# Patient Record
Sex: MALE | Race: WHITE | NOT HISPANIC OR LATINO | Employment: OTHER | RURAL
[De-identification: names, ages, dates, MRNs, and addresses within clinical notes are randomized per-mention and may not be internally consistent; named-entity substitution may affect disease eponyms.]

---

## 2017-04-07 ENCOUNTER — CONVERSION ENCOUNTER (OUTPATIENT)
Dept: FAMILY MEDICINE CLINIC | Facility: CLINIC | Age: 32
End: 2017-04-07

## 2017-04-08 LAB
ALBUMIN SERPL-MCNC: 4.2 G/DL (ref 3.6–5.1)
ALP SERPL-CCNC: 61 U/L (ref 40–115)
AST SERPL-CCNC: 33 U/L (ref 10–40)
BILIRUB SERPL-MCNC: 0.5 MG/DL (ref 0.2–1.2)
BUN SERPL-MCNC: 8 MG/DL (ref 7–25)
BUN/CREAT SERPL: 8.9 (CALC) (ref 6–22)
CALCIUM SERPL-MCNC: 9.5 MG/DL (ref 8.6–10.2)
CHLORIDE SERPL-SCNC: 105 MMOL/L (ref 98–110)
CHOLEST SERPL-MCNC: 209 MG/DL (ref 125–200)
CONV CO2: 27 MMOL/L (ref 21–33)
CONV TOTAL PROTEIN: 6.3 G/DL (ref 6.2–8.3)
CREAT UR-MCNC: 0.9 MG/DL (ref 0.79–1.33)
GLOBULIN UR ELPH-MCNC: 2.1 MG/DL (ref 2.1–3.7)
GLUCOSE UR QL: 89 MG/DL (ref 65–99)
HDLC SERPL-MCNC: 43 MG/DL
INSULIN SERPL-ACNC: 2 (CALC) (ref 1–2.1)
LDLC SERPL CALC-MCNC: 138 MG/DL
POTASSIUM SERPL-SCNC: 4 MMOL/L (ref 3.5–5.3)
SODIUM SERPL-SCNC: 143 MMOL/L (ref 135–146)
TRIGL SERPL-MCNC: 141 MG/DL
TSH SERPL-ACNC: 4.92 MIU/L (ref 0.4–4.5)

## 2017-12-21 ENCOUNTER — OFFICE (OUTPATIENT)
Dept: URBAN - METROPOLITAN AREA CLINIC 64 | Facility: CLINIC | Age: 32
End: 2017-12-21

## 2017-12-21 VITALS
HEART RATE: 87 BPM | HEIGHT: 67 IN | WEIGHT: 193 LBS | DIASTOLIC BLOOD PRESSURE: 90 MMHG | SYSTOLIC BLOOD PRESSURE: 133 MMHG

## 2017-12-21 DIAGNOSIS — K59.00 CONSTIPATION, UNSPECIFIED: ICD-10-CM

## 2017-12-21 DIAGNOSIS — K21.9 GASTRO-ESOPHAGEAL REFLUX DISEASE WITHOUT ESOPHAGITIS: ICD-10-CM

## 2017-12-21 PROCEDURE — 99214 OFFICE O/P EST MOD 30 MIN: CPT | Performed by: NURSE PRACTITIONER

## 2017-12-21 RX ORDER — OMEPRAZOLE 40 MG/1
CAPSULE, DELAYED RELEASE ORAL
Qty: 270 | Refills: 2 | Status: ACTIVE

## 2017-12-21 RX ORDER — DOCUSATE SODIUM 100 MG/1
100 CAPSULE ORAL
Qty: 90 | Refills: 3 | Status: ACTIVE
Start: 2017-12-21

## 2018-03-23 ENCOUNTER — HOSPITAL ENCOUNTER (OUTPATIENT)
Dept: GENERAL RADIOLOGY | Facility: HOSPITAL | Age: 33
Discharge: HOME | End: 2018-03-23
Attending: FAMILY MEDICINE | Admitting: FAMILY MEDICINE

## 2018-05-31 ENCOUNTER — HOSPITAL ENCOUNTER (OUTPATIENT)
Dept: GENERAL RADIOLOGY | Facility: HOSPITAL | Age: 33
Discharge: HOME | End: 2018-05-31
Attending: INTERNAL MEDICINE | Admitting: INTERNAL MEDICINE

## 2019-05-16 ENCOUNTER — HOSPITAL ENCOUNTER (OUTPATIENT)
Dept: OTHER | Facility: HOSPITAL | Age: 34
Discharge: HOME | End: 2019-05-16
Attending: FAMILY MEDICINE | Admitting: FAMILY MEDICINE

## 2019-06-17 ENCOUNTER — TELEPHONE (OUTPATIENT)
Dept: FAMILY MEDICINE CLINIC | Facility: CLINIC | Age: 34
End: 2019-06-17

## 2019-06-17 NOTE — TELEPHONE ENCOUNTER
Patient stated that he is experiencing coughing fits. He wasn't sure on if its allergy or asthma related. He is scheduled to see Dr Callaway on Wednesday , just wanted to let you know since Dr Bassett was full .

## 2019-06-18 RX ORDER — CLOTRIMAZOLE 1 %
CREAM (GRAM) TOPICAL
Refills: 1 | COMMUNITY
Start: 2019-04-20 | End: 2020-07-13

## 2019-06-18 RX ORDER — BUSPIRONE HYDROCHLORIDE 7.5 MG/1
1 TABLET ORAL EVERY 12 HOURS
COMMUNITY
Start: 2018-10-26 | End: 2020-07-06

## 2019-06-18 RX ORDER — OMEPRAZOLE 40 MG/1
1 CAPSULE, DELAYED RELEASE ORAL DAILY
Refills: 1 | COMMUNITY
Start: 2019-03-13 | End: 2019-06-25

## 2019-06-18 RX ORDER — KETOCONAZOLE 20 MG/G
CREAM TOPICAL DAILY
COMMUNITY
End: 2019-08-01

## 2019-06-18 RX ORDER — METOPROLOL SUCCINATE 50 MG/1
50 TABLET, EXTENDED RELEASE ORAL DAILY
COMMUNITY
End: 2019-10-25

## 2019-06-18 RX ORDER — ALBUTEROL SULFATE 90 UG/1
2 AEROSOL, METERED RESPIRATORY (INHALATION) EVERY 4 HOURS PRN
COMMUNITY
Start: 2018-05-25 | End: 2020-07-06

## 2019-06-20 ENCOUNTER — OFFICE VISIT (OUTPATIENT)
Dept: FAMILY MEDICINE CLINIC | Facility: CLINIC | Age: 34
End: 2019-06-20

## 2019-06-20 VITALS
HEIGHT: 67 IN | DIASTOLIC BLOOD PRESSURE: 64 MMHG | TEMPERATURE: 98.2 F | BODY MASS INDEX: 28.25 KG/M2 | WEIGHT: 180 LBS | HEART RATE: 84 BPM | SYSTOLIC BLOOD PRESSURE: 108 MMHG | OXYGEN SATURATION: 99 % | RESPIRATION RATE: 20 BRPM

## 2019-06-20 DIAGNOSIS — J30.89 PERENNIAL ALLERGIC RHINITIS: ICD-10-CM

## 2019-06-20 DIAGNOSIS — R05.9 COUGH: Primary | ICD-10-CM

## 2019-06-20 PROBLEM — E78.00 HIGH CHOLESTEROL: Status: ACTIVE | Noted: 2019-06-20

## 2019-06-20 PROBLEM — F84.5 ASPERGER'S SYNDROME: Status: ACTIVE | Noted: 2019-06-20

## 2019-06-20 PROBLEM — F98.8 ADD (ATTENTION DEFICIT DISORDER): Status: ACTIVE | Noted: 2019-06-20

## 2019-06-20 PROBLEM — K44.9 SLIDING HIATAL HERNIA: Status: ACTIVE | Noted: 2019-06-20

## 2019-06-20 PROCEDURE — 99213 OFFICE O/P EST LOW 20 MIN: CPT | Performed by: FAMILY MEDICINE

## 2019-06-20 RX ORDER — LAMOTRIGINE 100 MG/1
150 TABLET ORAL DAILY
Qty: 30 TABLET | Refills: 0 | Status: CANCELLED | OUTPATIENT
Start: 2019-06-20

## 2019-06-20 RX ORDER — BENZONATATE 100 MG/1
100 CAPSULE ORAL 3 TIMES DAILY PRN
Qty: 30 CAPSULE | Refills: 0 | Status: SHIPPED | OUTPATIENT
Start: 2019-06-20 | End: 2019-06-25

## 2019-06-20 RX ORDER — NAPROXEN 500 MG/1
500 TABLET ORAL 2 TIMES DAILY PRN
Qty: 60 TABLET | Refills: 12 | Status: CANCELLED
Start: 2019-06-20 | End: 2019-07-20

## 2019-06-20 NOTE — PROGRESS NOTES
Subjective   Sourav Tamez is a 34 y.o. male.     Cough   This is a new problem. The current episode started 1 to 4 weeks ago. The problem has been unchanged. The problem occurs constantly. The cough is non-productive. Associated symptoms include postnasal drip and rhinorrhea. Pertinent negatives include no chest pain, ear pain, fever, headaches, heartburn, hemoptysis, rash, shortness of breath or wheezing. The symptoms are aggravated by cold air. He has tried steroid inhaler for the symptoms. The treatment provided no relief. His past medical history is significant for asthma.    Pt states he possibly had reaction to steroids but is unsure    The following portions of the patient's history were reviewed and updated as appropriate: allergies, current medications, past family history, past medical history, past social history, past surgical history and problem list.    Review of Systems   Constitutional: Negative for activity change, appetite change, fatigue and fever.   HENT: Positive for postnasal drip and rhinorrhea. Negative for ear pain and voice change.    Eyes: Negative for visual disturbance.   Respiratory: Positive for cough. Negative for hemoptysis, shortness of breath and wheezing.    Cardiovascular: Negative for chest pain and leg swelling.   Gastrointestinal: Negative for abdominal pain, blood in stool, constipation, diarrhea, nausea and vomiting.   Endocrine: Negative for polydipsia and polyuria.   Genitourinary: Negative for dysuria, frequency and hematuria.   Musculoskeletal: Negative for joint swelling, neck pain and neck stiffness.   Skin: Negative for rash and bruise.   Neurological: Negative for weakness, numbness and headache.   Psychiatric/Behavioral: Negative for suicidal ideas and depressed mood.       Objective   Physical Exam   Constitutional: He is oriented to person, place, and time. He appears well-developed and well-nourished.   Eyes: Conjunctivae and EOM are normal. Pupils are equal,  round, and reactive to light.   Neck: Normal range of motion. Neck supple.   Cardiovascular: Normal rate, regular rhythm and normal heart sounds.   Pulmonary/Chest: Effort normal and breath sounds normal.   Abdominal: Soft. Bowel sounds are normal.   Musculoskeletal: Normal range of motion.   Neurological: He is alert and oriented to person, place, and time.   Skin: Skin is warm and dry.   Psychiatric: He has a normal mood and affect. His behavior is normal. Judgment and thought content normal.         Assessment/Plan   Problems Addressed this Visit        Respiratory    Perennial allergic rhinitis    Cough - Primary

## 2019-06-20 NOTE — ASSESSMENT & PLAN NOTE
Add tessalon. Follow-up in approximately 3 days for reevaluation if not improved.  Follow-up sooner for worsening symptoms or any concerns.

## 2019-06-21 DIAGNOSIS — L30.9 ECZEMA, UNSPECIFIED TYPE: Primary | ICD-10-CM

## 2019-06-21 RX ORDER — CLOTRIMAZOLE 1 %
CREAM (GRAM) TOPICAL
Qty: 60 G | Refills: 0 | OUTPATIENT
Start: 2019-06-21

## 2019-06-21 RX ORDER — METHYLPREDNISOLONE 4 MG/1
TABLET ORAL
Refills: 0 | COMMUNITY
Start: 2019-05-20 | End: 2019-06-25

## 2019-06-21 RX ORDER — FLUTICASONE PROPIONATE 50 MCG
50 SPRAY, SUSPENSION (ML) NASAL
Refills: 11 | COMMUNITY
Start: 2019-06-17 | End: 2019-10-25

## 2019-06-21 RX ORDER — DOXYCYCLINE HYCLATE 100 MG/1
100 CAPSULE ORAL
COMMUNITY
Start: 2017-11-07 | End: 2019-06-25

## 2019-06-21 RX ORDER — LAMOTRIGINE 150 MG/1
150 TABLET ORAL DAILY
Refills: 5 | COMMUNITY
Start: 2019-05-16 | End: 2021-03-16 | Stop reason: DRUGHIGH

## 2019-06-25 ENCOUNTER — OFFICE VISIT (OUTPATIENT)
Dept: FAMILY MEDICINE CLINIC | Facility: CLINIC | Age: 34
End: 2019-06-25

## 2019-06-25 VITALS
HEART RATE: 103 BPM | SYSTOLIC BLOOD PRESSURE: 112 MMHG | BODY MASS INDEX: 28.03 KG/M2 | DIASTOLIC BLOOD PRESSURE: 78 MMHG | TEMPERATURE: 98.4 F | HEIGHT: 67 IN | OXYGEN SATURATION: 98 % | WEIGHT: 178.6 LBS | RESPIRATION RATE: 16 BRPM

## 2019-06-25 DIAGNOSIS — H61.23 BILATERAL HEARING LOSS DUE TO CERUMEN IMPACTION: ICD-10-CM

## 2019-06-25 DIAGNOSIS — J30.1 SEASONAL ALLERGIC RHINITIS DUE TO POLLEN: ICD-10-CM

## 2019-06-25 DIAGNOSIS — J45.41 MODERATE PERSISTENT ASTHMA WITH ACUTE EXACERBATION: Primary | ICD-10-CM

## 2019-06-25 DIAGNOSIS — H92.03 EAR PAIN, BILATERAL: ICD-10-CM

## 2019-06-25 DIAGNOSIS — J45.41 MODERATE PERSISTENT ASTHMA WITH ACUTE EXACERBATION: ICD-10-CM

## 2019-06-25 PROCEDURE — 69209 REMOVE IMPACTED EAR WAX UNI: CPT | Performed by: FAMILY MEDICINE

## 2019-06-25 PROCEDURE — 99214 OFFICE O/P EST MOD 30 MIN: CPT | Performed by: FAMILY MEDICINE

## 2019-06-25 RX ORDER — MONTELUKAST SODIUM 10 MG/1
10 TABLET ORAL NIGHTLY
Qty: 30 TABLET | Refills: 12 | Status: SHIPPED | OUTPATIENT
Start: 2019-06-25 | End: 2019-10-25

## 2019-06-25 NOTE — PROGRESS NOTES
"Don Tamez is a 34 y.o. male.     Cough   This is a recurrent problem. The current episode started more than 1 month ago. The problem has been unchanged. The cough is non-productive. Pertinent negatives include no rash. The symptoms are aggravated by pollens. He has tried ipratropium inhaler and steroid inhaler for the symptoms. The treatment provided mild relief. His past medical history is significant for environmental allergies.   Hearing Problem   This is a new problem. The current episode started in the past 7 days. The problem occurs constantly. The problem has been unchanged. Pertinent negatives include no abdominal pain, arthralgias, nausea, rash, swollen glands or vomiting.        The following portions of the patient's history were reviewed and updated as appropriate: allergies, current medications, past family history, past medical history, past social history, past surgical history and problem list.    Review of Systems   Constitutional: Negative for activity change.   HENT: Negative for sinus pressure and voice change.    Eyes: Negative for visual disturbance.   Gastrointestinal: Negative for abdominal pain, diarrhea, nausea and vomiting.   Endocrine: Negative for cold intolerance and heat intolerance.   Genitourinary: Negative for frequency and urgency.   Musculoskeletal: Negative for arthralgias.   Skin: Negative for rash.   Allergic/Immunologic: Positive for environmental allergies.   Neurological: Negative for syncope.   Hematological: Does not bruise/bleed easily.   Psychiatric/Behavioral: Negative for depressed mood. The patient is not nervous/anxious.        Objective   Visit Vitals  /78   Pulse 103   Temp 98.4 °F (36.9 °C) (Oral)   Resp 16   Ht 170.2 cm (67\")   Wt 81 kg (178 lb 9.6 oz)   SpO2 98%   BMI 27.97 kg/m²     Physical Exam   Constitutional: He appears well-developed and well-nourished.   HENT:   Right Ear: External ear normal. cerumen impaction is present.  Left " Ear: External ear normal. An impacted cerumen is present.  Nose: Nose normal.   Mouth/Throat: Oropharynx is clear and moist. No oropharyngeal exudate.   Only right irrigated. Stopped alf bc of pain   Cardiovascular: Regular rhythm and normal heart sounds. Exam reveals no gallop and no friction rub.   No murmur heard.  Pulmonary/Chest: Effort normal and breath sounds normal. No respiratory distress. He has no wheezes. He has no rales.   Lymphadenopathy:     He has no cervical adenopathy.   Neurological: He is alert.   Skin: Skin is warm and dry.   Vitals reviewed.        Assessment/Plan   Problem List Items Addressed This Visit     None      Visit Diagnoses     Moderate persistent asthma with acute exacerbation    -  Primary    mild exacerbation, no wheezing. Continue with current meds but add singulair, increase advair    Relevant Medications    montelukast (SINGULAIR) 10 MG tablet    fluticasone-salmeterol (ADVAIR DISKUS) 250-50 MCG/DOSE DISKUS    Seasonal allergic rhinitis due to pollen        Contributing to his cough. He sees an allergist and gets injections. Added singulair.     Ear pain, bilateral        Bilateral hearing loss due to cerumen impaction        only right irrigated. Had to stop bc of pain.         Return if needed. Advised he needs to talk to his allergist also about his injections and cough as well.

## 2019-06-26 ENCOUNTER — TELEPHONE (OUTPATIENT)
Dept: FAMILY MEDICINE CLINIC | Facility: CLINIC | Age: 34
End: 2019-06-26

## 2019-06-26 NOTE — TELEPHONE ENCOUNTER
[employer] not covered req. A change to Symbicort, Advair disk, Breo Ellipta, and or Advair HFA ER is this ok to change ?

## 2019-06-27 ENCOUNTER — PATIENT MESSAGE (OUTPATIENT)
Dept: FAMILY MEDICINE CLINIC | Facility: CLINIC | Age: 34
End: 2019-06-27

## 2019-06-27 ENCOUNTER — TELEPHONE (OUTPATIENT)
Dept: FAMILY MEDICINE CLINIC | Facility: CLINIC | Age: 34
End: 2019-06-27

## 2019-06-27 RX ORDER — MONTELUKAST SODIUM 10 MG/1
10 TABLET ORAL NIGHTLY
Qty: 90 TABLET | Refills: 1 | OUTPATIENT
Start: 2019-06-27

## 2019-06-27 NOTE — TELEPHONE ENCOUNTER
Patient was last seen on, 6-    Patient has an appointment to be seen on, Does not have a follow up appointment scheduled.

## 2019-06-27 NOTE — TELEPHONE ENCOUNTER
From: Sourav Tamez  To: Judy Bassett MD  Sent: 6/27/2019 11:57 AM EDT  Subject: Prescription Question    Hejos Benjamin I was wanting to know what would the otc look like would it be Prilosec cause that's all I'm finding at [facility] for otc

## 2019-07-01 DIAGNOSIS — L30.9 ECZEMA, UNSPECIFIED TYPE: Primary | ICD-10-CM

## 2019-07-09 DIAGNOSIS — L30.9 ECZEMA, UNSPECIFIED TYPE: ICD-10-CM

## 2019-07-24 RX ORDER — METOPROLOL SUCCINATE 25 MG/1
TABLET, EXTENDED RELEASE ORAL
Qty: 90 TABLET | Refills: 0 | Status: SHIPPED | OUTPATIENT
Start: 2019-07-24 | End: 2019-09-28 | Stop reason: SDUPTHER

## 2019-08-01 ENCOUNTER — OFFICE VISIT (OUTPATIENT)
Dept: FAMILY MEDICINE CLINIC | Facility: CLINIC | Age: 34
End: 2019-08-01

## 2019-08-01 VITALS
TEMPERATURE: 98.2 F | SYSTOLIC BLOOD PRESSURE: 124 MMHG | RESPIRATION RATE: 16 BRPM | HEART RATE: 86 BPM | WEIGHT: 176.8 LBS | DIASTOLIC BLOOD PRESSURE: 82 MMHG | HEIGHT: 67 IN | BODY MASS INDEX: 27.75 KG/M2 | OXYGEN SATURATION: 98 %

## 2019-08-01 DIAGNOSIS — M79.671 RIGHT FOOT PAIN: ICD-10-CM

## 2019-08-01 DIAGNOSIS — M72.2 PLANTAR FASCIITIS, RIGHT: ICD-10-CM

## 2019-08-01 DIAGNOSIS — G25.3 MUSCLE JERKS DURING SLEEP: Primary | ICD-10-CM

## 2019-08-01 PROCEDURE — 99214 OFFICE O/P EST MOD 30 MIN: CPT | Performed by: FAMILY MEDICINE

## 2019-08-01 NOTE — PROGRESS NOTES
Subjective   Sourav Tamez is a 34 y.o. male.     Lower Extremity Issue   This is a new problem. The current episode started 1 to 4 weeks ago (right foot ball). The problem occurs intermittently. The problem has been unchanged. Pertinent negatives include no abdominal pain, arthralgias, congestion, coughing, headaches, joint swelling, nausea, neck pain, numbness, sore throat, swollen glands, vertigo, visual change, vomiting or weakness. The symptoms are aggravated by walking.   Shaking   This is a new problem. The current episode started more than 1 month ago. The problem occurs intermittently (at night). The problem has been unchanged. Pertinent negatives include no abdominal pain, arthralgias, congestion, coughing, headaches, joint swelling, nausea, neck pain, numbness, sore throat, swollen glands, vertigo, visual change, vomiting or weakness. Exacerbated by: laying down. He has tried nothing for the symptoms.   Occurs as he is falling asleep. He jerks as he is going to sleep.      The following portions of the patient's history were reviewed and updated as appropriate: allergies, current medications, past family history, past medical history, past social history, past surgical history and problem list.    Family History   Problem Relation Age of Onset   • Hypertension Mother    • Hyperlipidemia Mother    • Hyperlipidemia Maternal Grandmother        Social History     Tobacco Use   • Smoking status: Never Smoker   Substance Use Topics   • Alcohol use: Yes   • Drug use: No       Past Surgical History:   Procedure Laterality Date   • TONSILLECTOMY  1988       Patient Active Problem List   Diagnosis   • Perennial allergic rhinitis   • Cough   • ADD (attention deficit disorder)   • Asperger's syndrome   • High cholesterol   • Sliding hiatal hernia       Current Outpatient Medications on File Prior to Visit   Medication Sig Dispense Refill   • albuterol sulfate HFA (VENTOLIN HFA) 108 (90 Base) MCG/ACT inhaler Take 2  "puffs by mouth Every 4 (Four) Hours As Needed.     • busPIRone (BUSPAR) 7.5 MG tablet Take 1 tablet by mouth Every 12 (Twelve) Hours.     • clotrimazole (LOTRIMIN) 1 % cream RAKAN EXT AA BID  1   • fluticasone (FLONASE) 50 MCG/ACT nasal spray 50 mcg.  11   • fluticasone-salmeterol (ADVAIR DISKUS) 250-50 MCG/DOSE DISKUS Inhale 1 puff 2 (Two) Times a Day. 1 each 3   • lamoTRIgine (LaMICtal) 150 MG tablet Take 150 mg by mouth Daily.  5   • metoprolol succinate XL (TOPROL-XL) 25 MG 24 hr tablet TAKE 1 TABLET BY MOUTH EVERY DAY 90 tablet 0   • mupirocin (BACTROBAN) 2 % ointment Apply  topically to the appropriate area as directed See Admin Instructions. Apply to the affected area three time daily 22 g 0   • metoprolol succinate XL (TOPROL-XL) 50 MG 24 hr tablet Take 50 mg by mouth Daily.     • montelukast (SINGULAIR) 10 MG tablet Take 1 tablet by mouth Every Night. 30 tablet 12   • [DISCONTINUED] ketoconazole (NIZORAL) 2 % cream Apply  topically to the appropriate area as directed Daily.       No current facility-administered medications on file prior to visit.        Allergies   Allergen Reactions   • Penicillins Shortness Of Breath   • Amoxicillin Swelling   • Codeine Myalgia   • Doxycycline Swelling   • Escitalopram Swelling     Swelling of throat   • Mucinex [Guaifenesin Er] Cough   • Robitussin (Alcohol Free) [Guaifenesin] Cough   • Trazodone Swelling   • Latex Rash   • Olanzapine Rash       Review of Systems   HENT: Negative for congestion and sore throat.    Respiratory: Negative for cough.    Gastrointestinal: Negative for abdominal pain, nausea and vomiting.   Musculoskeletal: Negative for arthralgias, joint swelling and neck pain.   Neurological: Negative for vertigo, weakness and numbness.       Objective   Visit Vitals  /82   Pulse 86   Temp 98.2 °F (36.8 °C)   Resp 16   Ht 168.9 cm (66.5\")   Wt 80.2 kg (176 lb 12.8 oz)   SpO2 98%   BMI 28.11 kg/m²     Physical Exam   Constitutional: He is oriented to " person, place, and time. He appears well-developed and well-nourished. He is cooperative.   Eyes: EOM are normal. Pupils are equal, round, and reactive to light.   Cardiovascular: Normal rate, regular rhythm and normal heart sounds. Exam reveals no gallop and no friction rub.   No murmur heard.  Pulmonary/Chest: Effort normal and breath sounds normal. He has no wheezes. He has no rales.   Musculoskeletal:   Right foot exam negative for pain or lesion   Neurological: He is alert and oriented to person, place, and time. He has normal reflexes. He is not disoriented. He displays normal reflexes. He exhibits normal muscle tone. Coordination normal.   Skin: Skin is warm and dry.   Psychiatric: He has a normal mood and affect.   Vitals reviewed.        Assessment/Plan .  Problem List Items Addressed This Visit     None      Visit Diagnoses     Muscle jerks during sleep    -  Primary    Occrs once in a while and sounds benign. Occurs as he is falling alseep. If gets worse he is to let us know .    Right foot pain        Plantar fasciitis, right        discussed nsaids and ice. stretches discussed

## 2019-08-26 DIAGNOSIS — L30.9 ECZEMA, UNSPECIFIED TYPE: ICD-10-CM

## 2019-09-02 DIAGNOSIS — L30.9 ECZEMA, UNSPECIFIED TYPE: ICD-10-CM

## 2019-09-17 DIAGNOSIS — L30.9 ECZEMA, UNSPECIFIED TYPE: ICD-10-CM

## 2019-09-24 DIAGNOSIS — L30.9 ECZEMA, UNSPECIFIED TYPE: ICD-10-CM

## 2019-09-25 DIAGNOSIS — L30.9 ECZEMA, UNSPECIFIED TYPE: ICD-10-CM

## 2019-09-30 RX ORDER — METOPROLOL SUCCINATE 25 MG/1
TABLET, EXTENDED RELEASE ORAL
Qty: 30 TABLET | Refills: 0 | Status: SHIPPED | OUTPATIENT
Start: 2019-09-30 | End: 2019-10-28 | Stop reason: SDUPTHER

## 2019-10-01 ENCOUNTER — FLU SHOT (OUTPATIENT)
Dept: FAMILY MEDICINE CLINIC | Facility: CLINIC | Age: 34
End: 2019-10-01

## 2019-10-01 DIAGNOSIS — Z23 FLU VACCINE NEED: ICD-10-CM

## 2019-10-01 PROCEDURE — G0008 ADMIN INFLUENZA VIRUS VAC: HCPCS | Performed by: FAMILY MEDICINE

## 2019-10-01 PROCEDURE — 90674 CCIIV4 VAC NO PRSV 0.5 ML IM: CPT | Performed by: FAMILY MEDICINE

## 2019-10-25 ENCOUNTER — OFFICE VISIT (OUTPATIENT)
Dept: CARDIOLOGY | Facility: CLINIC | Age: 34
End: 2019-10-25

## 2019-10-25 VITALS
WEIGHT: 181 LBS | BODY MASS INDEX: 29.09 KG/M2 | DIASTOLIC BLOOD PRESSURE: 79 MMHG | HEART RATE: 78 BPM | HEIGHT: 66 IN | SYSTOLIC BLOOD PRESSURE: 120 MMHG

## 2019-10-25 DIAGNOSIS — R00.2 PALPITATIONS: ICD-10-CM

## 2019-10-25 DIAGNOSIS — E78.00 HIGH CHOLESTEROL: Primary | ICD-10-CM

## 2019-10-25 DIAGNOSIS — R00.0 TACHYCARDIA: ICD-10-CM

## 2019-10-25 DIAGNOSIS — I47.1 PAROXYSMAL SVT (SUPRAVENTRICULAR TACHYCARDIA) (HCC): ICD-10-CM

## 2019-10-25 PROCEDURE — 99214 OFFICE O/P EST MOD 30 MIN: CPT | Performed by: INTERNAL MEDICINE

## 2019-10-25 PROCEDURE — 93000 ELECTROCARDIOGRAM COMPLETE: CPT | Performed by: INTERNAL MEDICINE

## 2019-10-25 RX ORDER — OMEPRAZOLE 40 MG/1
CAPSULE, DELAYED RELEASE ORAL AS NEEDED
Refills: 1 | COMMUNITY
Start: 2019-09-21 | End: 2020-07-21 | Stop reason: SDUPTHER

## 2019-10-25 NOTE — PROGRESS NOTES
Date of Office Visit: 10/25/2019  Encounter Provider:Dr Alvin Arriaza  Place of Service: [facility] CARDIOLOG[facility] [place]  Patient Name: Sourav Tamez  :1985  Judy Bassett MD    Chief Complaint   Patient presents with   • Hyperlipidemia  Sinus tachycardia  SVT  Palpitation     1 yr fu     History of Present Illness    I am pleased to see Mr. tamez in my office today  as a follow-up     As you know, patient is 34-year-old white gentleman whose past medical history is  significant for autism, Aspergers syndrome who was referred to me for symptom of palpitation.     Patient reports that he is having symptom of palpitation from last few months.  Patient underwent Holter monitor in your office.  Holter monitor showed narrow complex tachycardia with heart rate greater than 150..  Possibility of SVT cannot be excluded but most likely it seems to be sinus tachycardia.  In May 2018, patient underwent echocardiogram which showed EF of 55-60%.  Trace mitral regurgitation was noted.    Since the previous visit, patient is overall doing fairly well.  Patient denies any symptom of angina pectoris or congestive heart failure.  No orthopnea, PND, syncope or presyncope.  No leg edema noted.  No lightheadedness or dizziness.  No palpitation.  No recurrence of any arrhythmia.    EKG done in my office showed normal sinus rhythm.    At this stage I will continue current treatment.  His symptoms are totally well controlled with low-dose beta-blocker.  I will see the patient as needed or in 1 year    Past Medical History:   Diagnosis Date   • Acute bronchitis due to other specified organisms     Impression: Increase fluids. Tylenol/motrin for pain or fever. Medication and medication adverse effects discussed. Follow-up 5-7 days for reevaluation if not improved or sooner if needed. Allergic component. Start prednisone.   • Acute non-recurrent maxillary sinusitis     Impression: mild. Increase fluids. Tylenol/motrin for pain or  fever. Medication and medication adverse effects discussed. Follow-up 5-7 days for reevaluation if not improved or sooner if needed.   • Acute non-seasonal allergic rhinitis     Impression: With post nasal drip. Cause of URI symptoms antihistamines discussed Diagnosis, treatment and course discussed. Discussed medication, dosing and adverse effects. Return if there is worsening or persistance of symptoms   • Acute pain of right knee     Impression: discussed nsaids and stretches exam was negative strain most likely   • Advance care planning    • Alcohol screening    • Anxiety and depression     Impression: Good social support, no suicidal or homicidal ideation. Diagnosis and treatment discussed. Discussed counseling. Discussed medication, dosing and adverse effects. Return in 4 weeks   • Asperger syndrome     Impression: stable 9/24/18   • Cough     Impression: Check CXR   • Depression, major, recurrent, mild (CMS/HCC)     Impression: seeing psych   • External hemorrhoid     Impression: none bleeding now and small discussed home care   • Folliculitis     Impression: Diagnosis, treatment and course discussed. Discussed medication, dosing and adverse effects. Return if there is worsening or persistance of symptoms   • Gastroesophageal reflux disease without esophagitis     Impression: he has seen GI. refill medication   • Hiatal hernia    • Hypothyroidism     Impression: recheck today   • Impetigo    • Labile mood     Impression: question if he has some bipolar disorder? Given phone number.   • Medicare annual wellness visit, subsequent     With abnormal findings.    • Mild intermittent asthma with (acute) exacerbation     Impression: mildly exacerbated. no wheeze on exam   • Mixed hyperlipidemia     Impression: will check labs when fasting.   • Overweight (BMI 25.0-29.9)    • Right hip pain     Impression: discussed nsaids and stretches exam was negative strain most likely   • Screening for depression    • Screening  PSA (prostate specific antigen)    • Substance abuse (CMS/[facility])     Story: multiple psych meds         Past Surgical History:   Procedure Laterality Date   • TONSILLECTOMY  1988           Current Outpatient Medications:   •  albuterol sulfate HFA (VENTOLIN HFA) 108 (90 Base) MCG/ACT inhaler, Take 2 puffs by mouth Every 4 (Four) Hours As Needed., Disp: , Rfl:   •  busPIRone (BUSPAR) 7.5 MG tablet, Take 1 tablet by mouth Every 12 (Twelve) Hours., Disp: , Rfl:   •  clotrimazole (LOTRIMIN) 1 % cream, RAKAN EXT AA BID, Disp: , Rfl: 1  •  fluticasone-salmeterol (ADVAIR DISKUS) 250-50 MCG/DOSE DISKUS, Inhale 1 puff 2 (Two) Times a Day., Disp: 1 each, Rfl: 3  •  lamoTRIgine (LaMICtal) 150 MG tablet, Take 150 mg by mouth Daily., Disp: , Rfl: 5  •  metoprolol succinate XL (TOPROL-XL) 25 MG 24 hr tablet, TAKE 1 TABLET BY MOUTH DAILY, Disp: 30 tablet, Rfl: 0  •  omeprazole (priLOSEC) 40 MG capsule, TK 1 C PO QAM ONE HOUR BEFORE MEALS, Disp: , Rfl: 1      Social History     Socioeconomic History   • Marital status:      Spouse name: Not on file   • Number of children: Not on file   • Years of education: Not on file   • Highest education level: Not on file   Tobacco Use   • Smoking status: Never Smoker   Substance and Sexual Activity   • Alcohol use: Yes   • Drug use: No         Review of Systems   Constitution: Negative for chills and fever.   HENT: Negative for ear discharge and nosebleeds.    Eyes: Negative for discharge and redness.   Cardiovascular: Negative for chest pain, orthopnea, palpitations, paroxysmal nocturnal dyspnea and syncope.   Respiratory: Negative for cough, shortness of breath and wheezing.    Endocrine: Negative for heat intolerance.   Skin: Negative for rash.   Musculoskeletal: Negative for arthritis and myalgias.   Gastrointestinal: Negative for abdominal pain, melena, nausea and vomiting.   Genitourinary: Negative for dysuria and hematuria.   Neurological: Negative for dizziness, light-headedness,  "numbness and tremors.   Psychiatric/Behavioral: Negative for depression. The patient is not nervous/anxious.        Procedures      ECG 12 Lead  Date/Time: 10/25/2019 12:58 PM  Performed by: Alvin Arriaza MD  Authorized by: Alvin Arriaza MD   Comparison: compared with previous ECG   Similar to previous ECG  Rhythm: sinus rhythm    Clinical impression: normal ECG            ECG 12 Lead    (Results Pending)           Objective:    /79   Pulse 78   Ht 167.6 cm (66\")   Wt 82.1 kg (181 lb)   BMI 29.21 kg/m²         Physical Exam   Constitutional: He is oriented to person, place, and time. He appears well-developed and well-nourished.   HENT:   Head: Normocephalic and atraumatic.   Eyes: No scleral icterus.   Neck: No thyromegaly present.   Cardiovascular: Normal rate, regular rhythm and normal heart sounds. Exam reveals no gallop and no friction rub.   No murmur heard.  Pulmonary/Chest: Effort normal and breath sounds normal. No respiratory distress. He has no wheezes. He has no rales.   Abdominal: There is no tenderness.   Musculoskeletal: He exhibits no edema.   Lymphadenopathy:     He has no cervical adenopathy.   Neurological: He is alert and oriented to person, place, and time.   Skin: No rash noted. No erythema.   Psychiatric: He has a normal mood and affect.           Assessment:       Diagnosis Plan   1. High cholesterol  ECG 12 Lead   2. Palpitations  ECG 12 Lead   3. Tachycardia  ECG 12 Lead   4. Paroxysmal SVT (supraventricular tachycardia) (CMS/HCC)  ECG 12 Lead            Plan:       At this stage, I would recommend to proceed with current treatment.  I advised him to follow with PCP and he can have a prescription from PCP but patient wants to follow yearly no new recommendation.  Patient is advised to decrease caffeine intake.  "

## 2019-10-28 RX ORDER — METOPROLOL SUCCINATE 25 MG/1
TABLET, EXTENDED RELEASE ORAL
Qty: 30 TABLET | Refills: 0 | Status: SHIPPED | OUTPATIENT
Start: 2019-10-28 | End: 2019-12-02 | Stop reason: SDUPTHER

## 2019-10-29 DIAGNOSIS — J45.41 MODERATE PERSISTENT ASTHMA WITH ACUTE EXACERBATION: ICD-10-CM

## 2019-11-06 DIAGNOSIS — J45.41 MODERATE PERSISTENT ASTHMA WITH ACUTE EXACERBATION: ICD-10-CM

## 2019-11-08 RX ORDER — MONTELUKAST SODIUM 10 MG/1
10 TABLET ORAL NIGHTLY
Qty: 90 TABLET | Refills: 12 | Status: SHIPPED | OUTPATIENT
Start: 2019-11-08 | End: 2020-06-25

## 2019-11-25 DIAGNOSIS — L30.9 ECZEMA, UNSPECIFIED TYPE: ICD-10-CM

## 2019-12-02 RX ORDER — METOPROLOL SUCCINATE 25 MG/1
TABLET, EXTENDED RELEASE ORAL
Qty: 30 TABLET | Refills: 0 | Status: SHIPPED | OUTPATIENT
Start: 2019-12-02 | End: 2019-12-30

## 2019-12-12 ENCOUNTER — OFFICE VISIT (OUTPATIENT)
Dept: FAMILY MEDICINE CLINIC | Facility: CLINIC | Age: 34
End: 2019-12-12

## 2019-12-12 ENCOUNTER — HOSPITAL ENCOUNTER (OUTPATIENT)
Dept: CT IMAGING | Facility: HOSPITAL | Age: 34
Discharge: HOME | End: 2019-12-12
Admitting: FAMILY MEDICINE

## 2019-12-12 VITALS
TEMPERATURE: 98.2 F | HEIGHT: 67 IN | OXYGEN SATURATION: 98 % | DIASTOLIC BLOOD PRESSURE: 70 MMHG | BODY MASS INDEX: 28.47 KG/M2 | WEIGHT: 181.4 LBS | RESPIRATION RATE: 16 BRPM | HEART RATE: 90 BPM | SYSTOLIC BLOOD PRESSURE: 120 MMHG

## 2019-12-12 DIAGNOSIS — J34.9 PARANASAL SINUS DISEASE: ICD-10-CM

## 2019-12-12 DIAGNOSIS — R42 DIZZINESS: Primary | ICD-10-CM

## 2019-12-12 DIAGNOSIS — R42 DIZZINESS: ICD-10-CM

## 2019-12-12 DIAGNOSIS — B35.6 TINEA CRURIS: ICD-10-CM

## 2019-12-12 DIAGNOSIS — R53.83 OTHER FATIGUE: ICD-10-CM

## 2019-12-12 PROCEDURE — 99214 OFFICE O/P EST MOD 30 MIN: CPT | Performed by: FAMILY MEDICINE

## 2019-12-12 PROCEDURE — 70470 CT HEAD/BRAIN W/O & W/DYE: CPT

## 2019-12-12 PROCEDURE — 0 IOPAMIDOL PER 1 ML: Performed by: FAMILY MEDICINE

## 2019-12-12 RX ORDER — FLUTICASONE PROPIONATE 50 MCG
2 SPRAY, SUSPENSION (ML) NASAL DAILY
Qty: 1 BOTTLE | Refills: 12 | Status: SHIPPED | OUTPATIENT
Start: 2019-12-12 | End: 2019-12-12 | Stop reason: SDUPTHER

## 2019-12-12 RX ORDER — CLOTRIMAZOLE 1 %
CREAM (GRAM) TOPICAL 2 TIMES DAILY
Qty: 45 G | Refills: 3 | Status: SHIPPED | OUTPATIENT
Start: 2019-12-12 | End: 2020-09-03

## 2019-12-12 RX ORDER — CEPHALEXIN 500 MG/1
500 CAPSULE ORAL 3 TIMES DAILY
Qty: 42 CAPSULE | Refills: 0 | Status: SHIPPED | OUTPATIENT
Start: 2019-12-12 | End: 2019-12-26

## 2019-12-12 RX ADMIN — IOPAMIDOL 100 ML: 755 INJECTION, SOLUTION INTRAVENOUS at 11:15

## 2019-12-12 NOTE — PROGRESS NOTES
CT scan shows sinus infection. I sent in 14 days of keflex as long as he is not allergic and flonase. Use nasal saline also

## 2019-12-12 NOTE — PROGRESS NOTES
Subjective   Sourav Tamez is a 34 y.o. male.     Chief Complaint   Patient presents with   • Dizziness       Dizziness   This is a new problem. The current episode started 1 to 4 weeks ago. The problem occurs intermittently. The problem has been unchanged. Associated symptoms include vertigo. Pertinent negatives include no abdominal pain, arthralgias, chest pain, chills, congestion, coughing, fatigue, fever, joint swelling, myalgias, nausea, neck pain, numbness, rash, sore throat, swollen glands, vomiting or weakness. Nothing aggravates the symptoms. He has tried nothing for the symptoms.   Rash   This is a new problem. The current episode started in the past 7 days. The problem is unchanged. The affected locations include the groin. The rash is characterized by redness and itchiness. He was exposed to nothing. Pertinent negatives include no congestion, cough, diarrhea, eye pain, fatigue, fever, rhinorrhea, shortness of breath, sore throat or vomiting. Past treatments include nothing.        The following portions of the patient's history were reviewed and updated as appropriate: allergies, current medications, past family history, past medical history, past social history, past surgical history and problem list.    Allergies:  Allergies   Allergen Reactions   • Amoxicillin Swelling   • Doxycycline Swelling   • Escitalopram Swelling     Swelling of throat   • Penicillins Shortness Of Breath   • Trazodone Swelling   • Codeine Myalgia   • Latex Rash   • Mucinex [Guaifenesin Er] Cough   • Olanzapine Rash   • Robitussin (Alcohol Free) [Guaifenesin] Cough       Social History:  Social History     Socioeconomic History   • Marital status:      Spouse name: Not on file   • Number of children: Not on file   • Years of education: Not on file   • Highest education level: Not on file   Tobacco Use   • Smoking status: Never Smoker   • Smokeless tobacco: Never Used   Substance and Sexual Activity   • Alcohol use: Yes   •  Drug use: No       Family History:  Family History   Problem Relation Age of Onset   • Hypertension Mother    • Hyperlipidemia Mother    • Hyperlipidemia Maternal Grandmother        Past Medical History :  Patient Active Problem List   Diagnosis   • Perennial allergic rhinitis   • Cough   • ADD (attention deficit disorder)   • Asperger's syndrome   • High cholesterol   • Sliding hiatal hernia       Medication List:    Current Outpatient Medications:   •  albuterol sulfate HFA (VENTOLIN HFA) 108 (90 Base) MCG/ACT inhaler, Take 2 puffs by mouth Every 4 (Four) Hours As Needed., Disp: , Rfl:   •  busPIRone (BUSPAR) 7.5 MG tablet, Take 1 tablet by mouth Every 12 (Twelve) Hours., Disp: , Rfl:   •  fluticasone-salmeterol (ADVAIR DISKUS) 250-50 MCG/DOSE DISKUS, Inhale 1 puff 2 (Two) Times a Day., Disp: 1 each, Rfl: 0  •  lamoTRIgine (LaMICtal) 150 MG tablet, Take 150 mg by mouth Daily., Disp: , Rfl: 5  •  metoprolol succinate XL (TOPROL-XL) 25 MG 24 hr tablet, TAKE 1 TABLET BY MOUTH DAILY, Disp: 30 tablet, Rfl: 0  •  omeprazole (priLOSEC) 40 MG capsule, As Needed., Disp: , Rfl: 1  •  cephalexin (KEFLEX) 500 MG capsule, Take 1 capsule by mouth 3 (Three) Times a Day for 14 days., Disp: 42 capsule, Rfl: 0  •  clotrimazole (LOTRIMIN) 1 % cream, RAKAN EXT AA BID, Disp: , Rfl: 1  •  clotrimazole (LOTRIMIN) 1 % cream, Apply  topically to the appropriate area as directed 2 (Two) Times a Day., Disp: 45 g, Rfl: 3  •  fluticasone (FLONASE) 50 MCG/ACT nasal spray, USE 2 SPRAYS INTO THE NOSTRIL(S) AS DIRECTED BY PROVIDER DAILY, Disp: 48 g, Rfl: 0  •  montelukast (SINGULAIR) 10 MG tablet, TAKE 1 TABLET BY MOUTH EVERY NIGHT, Disp: 90 tablet, Rfl: 12    Past Surgical History:  Past Surgical History:   Procedure Laterality Date   • TONSILLECTOMY  1988       Review of Systems:  Review of Systems   Constitutional: Negative for activity change, chills, fatigue and fever.   HENT: Negative for congestion, ear pain, postnasal drip, rhinorrhea,  "sinus pressure, sneezing, sore throat, swollen glands, trouble swallowing and voice change.    Eyes: Negative for pain, redness, itching and visual disturbance.   Respiratory: Negative for cough, shortness of breath and wheezing.    Cardiovascular: Negative for chest pain.   Gastrointestinal: Negative for abdominal pain, diarrhea, nausea and vomiting.   Endocrine: Negative for cold intolerance and heat intolerance.   Genitourinary: Negative for frequency and urgency.   Musculoskeletal: Negative for arthralgias, joint swelling, myalgias and neck pain.   Skin: Negative for rash.   Neurological: Positive for dizziness and vertigo. Negative for syncope, weakness and numbness.   Hematological: Does not bruise/bleed easily.   Psychiatric/Behavioral: Negative for depressed mood. The patient is not nervous/anxious.        Physical Exam:  Vital Signs:  Visit Vitals  /70   Pulse 90   Temp 98.2 °F (36.8 °C)   Resp 16   Ht 170.2 cm (67\")   Wt 82.3 kg (181 lb 6.4 oz)   SpO2 98%   BMI 28.41 kg/m²       Physical Exam   Constitutional: He is oriented to person, place, and time. He appears well-developed and well-nourished. He is cooperative.   HENT:   Head: Normocephalic and atraumatic.   Right Ear: External ear normal. Tympanic membrane is not injected, not erythematous, not retracted and not bulging. No middle ear effusion.   Left Ear: External ear normal. Tympanic membrane is not injected, not erythematous, not retracted and not bulging.  No middle ear effusion.   Nose: Nose normal. No rhinorrhea.   Mouth/Throat: Oropharynx is clear and moist. No oropharyngeal exudate.   Cardiovascular: Normal rate, regular rhythm and normal heart sounds. Exam reveals no gallop and no friction rub.   No murmur heard.  Pulmonary/Chest: Effort normal and breath sounds normal. No respiratory distress. He has no wheezes. He has no rales.   Genitourinary:   Genitourinary Comments: Erythematous papules of scrotum    Lymphadenopathy:     He has " no cervical adenopathy.   Neurological: He is alert and oriented to person, place, and time. He has normal reflexes. He displays normal reflexes. No cranial nerve deficit or sensory deficit. He exhibits normal muscle tone. Coordination and gait normal.   Skin: Skin is warm and dry.   Psychiatric: He has a normal mood and affect.   Vitals reviewed.      Assessment and Plan:  Problem List Items Addressed This Visit     None      Visit Diagnoses     Dizziness    -  Primary    Relevant Orders    CBC & Differential (Completed)    Comprehensive Metabolic Panel (Completed)    TSH (Completed)    CT Head With & Without Contrast (Completed)    Other fatigue         Relevant Orders    TSH (Completed)    Tinea cruris        Relevant Medications    clotrimazole (LOTRIMIN) 1 % cream    Paranasal sinus disease        seen on ct  start antibiotica and flonase    Relevant Medications    cephalexin (KEFLEX) 500 MG capsule        Diagnosis, treatment and and course discussed. Potential side effects discussed. Return if there is worsening or persistence of symptoms.   Dicussed if there is loss of vision, confusion, weakness or numbness in an extremity, dropping of an eyelid or one side of the face, severe pain, intractable vomiting, go to the ER    CT showed sinusitis.Keflex sent in      An After Visit Summary and PPPS were given to the patient.

## 2019-12-13 LAB
ALBUMIN SERPL-MCNC: 4.6 G/DL (ref 3.5–5.5)
ALBUMIN/GLOB SERPL: 2.3 {RATIO} (ref 1.2–2.2)
ALP SERPL-CCNC: 63 IU/L (ref 39–117)
ALT SERPL-CCNC: 31 IU/L (ref 0–44)
AST SERPL-CCNC: 18 IU/L (ref 0–40)
BASOPHILS # BLD AUTO: 0 X10E3/UL (ref 0–0.2)
BASOPHILS NFR BLD AUTO: 0 %
BILIRUB SERPL-MCNC: 0.3 MG/DL (ref 0–1.2)
BUN SERPL-MCNC: 11 MG/DL (ref 6–20)
BUN/CREAT SERPL: 12 (ref 9–20)
CALCIUM SERPL-MCNC: 9.4 MG/DL (ref 8.7–10.2)
CHLORIDE SERPL-SCNC: 103 MMOL/L (ref 96–106)
CO2 SERPL-SCNC: 24 MMOL/L (ref 20–29)
CREAT SERPL-MCNC: 0.95 MG/DL (ref 0.76–1.27)
EOSINOPHIL # BLD AUTO: 0.2 X10E3/UL (ref 0–0.4)
EOSINOPHIL NFR BLD AUTO: 4 %
ERYTHROCYTE [DISTWIDTH] IN BLOOD BY AUTOMATED COUNT: 12.9 % (ref 12.3–15.4)
GLOBULIN SER CALC-MCNC: 2 G/DL (ref 1.5–4.5)
GLUCOSE SERPL-MCNC: 92 MG/DL (ref 65–99)
HCT VFR BLD AUTO: 45.5 % (ref 37.5–51)
HGB BLD-MCNC: 15.5 G/DL (ref 13–17.7)
IMM GRANULOCYTES # BLD AUTO: 0 X10E3/UL (ref 0–0.1)
IMM GRANULOCYTES NFR BLD AUTO: 0 %
LYMPHOCYTES # BLD AUTO: 1.3 X10E3/UL (ref 0.7–3.1)
LYMPHOCYTES NFR BLD AUTO: 25 %
MCH RBC QN AUTO: 29.4 PG (ref 26.6–33)
MCHC RBC AUTO-ENTMCNC: 34.1 G/DL (ref 31.5–35.7)
MCV RBC AUTO: 86 FL (ref 79–97)
MONOCYTES # BLD AUTO: 0.4 X10E3/UL (ref 0.1–0.9)
MONOCYTES NFR BLD AUTO: 7 %
NEUTROPHILS # BLD AUTO: 3.3 X10E3/UL (ref 1.4–7)
NEUTROPHILS NFR BLD AUTO: 64 %
PLATELET # BLD AUTO: 190 X10E3/UL (ref 150–450)
POTASSIUM SERPL-SCNC: 4.5 MMOL/L (ref 3.5–5.2)
PROT SERPL-MCNC: 6.6 G/DL (ref 6–8.5)
RBC # BLD AUTO: 5.27 X10E6/UL (ref 4.14–5.8)
SODIUM SERPL-SCNC: 143 MMOL/L (ref 134–144)
TSH SERPL DL<=0.005 MIU/L-ACNC: 3.67 UIU/ML (ref 0.45–4.5)
WBC # BLD AUTO: 5.2 X10E3/UL (ref 3.4–10.8)

## 2019-12-16 ENCOUNTER — TELEPHONE (OUTPATIENT)
Dept: FAMILY MEDICINE CLINIC | Facility: CLINIC | Age: 34
End: 2019-12-16

## 2019-12-16 RX ORDER — FLUTICASONE PROPIONATE 50 MCG
SPRAY, SUSPENSION (ML) NASAL
Qty: 48 G | Refills: 0 | Status: SHIPPED | OUTPATIENT
Start: 2019-12-16 | End: 2020-06-25

## 2019-12-16 NOTE — TELEPHONE ENCOUNTER
----- Message from Judy Bassett MD sent at 12/12/2019  1:05 PM EST -----  CT scan shows sinus infection. I sent in 14 days of keflex as long as he is not allergic and flonase. Use nasal saline also

## 2019-12-30 RX ORDER — METOPROLOL SUCCINATE 25 MG/1
TABLET, EXTENDED RELEASE ORAL
Qty: 90 TABLET | Refills: 3 | Status: SHIPPED | OUTPATIENT
Start: 2019-12-30 | End: 2020-04-27

## 2020-03-03 ENCOUNTER — OFFICE VISIT (OUTPATIENT)
Dept: FAMILY MEDICINE CLINIC | Facility: CLINIC | Age: 35
End: 2020-03-03

## 2020-03-03 VITALS
WEIGHT: 177.6 LBS | BODY MASS INDEX: 26.92 KG/M2 | OXYGEN SATURATION: 98 % | TEMPERATURE: 98.2 F | HEIGHT: 68 IN | HEART RATE: 95 BPM | RESPIRATION RATE: 18 BRPM | DIASTOLIC BLOOD PRESSURE: 78 MMHG | SYSTOLIC BLOOD PRESSURE: 110 MMHG

## 2020-03-03 DIAGNOSIS — J45.20 MILD INTERMITTENT ASTHMA WITHOUT COMPLICATION: Primary | ICD-10-CM

## 2020-03-03 DIAGNOSIS — J06.9 VIRAL UPPER RESPIRATORY TRACT INFECTION: ICD-10-CM

## 2020-03-03 PROBLEM — Z13.31 SCREENING FOR DEPRESSION: Status: ACTIVE | Noted: 2020-03-03

## 2020-03-03 PROBLEM — K21.9 GASTROESOPHAGEAL REFLUX DISEASE WITHOUT ESOPHAGITIS: Status: ACTIVE | Noted: 2020-03-03

## 2020-03-03 PROBLEM — J01.00 ACUTE NON-RECURRENT MAXILLARY SINUSITIS: Status: RESOLVED | Noted: 2020-03-03 | Resolved: 2020-03-03

## 2020-03-03 PROBLEM — J45.21 MILD INTERMITTENT ASTHMA WITH ACUTE EXACERBATION: Status: ACTIVE | Noted: 2020-03-03

## 2020-03-03 PROBLEM — J01.00 ACUTE NON-RECURRENT MAXILLARY SINUSITIS: Status: ACTIVE | Noted: 2020-03-03

## 2020-03-03 PROBLEM — F19.10 SUBSTANCE ABUSE: Status: ACTIVE | Noted: 2020-03-03

## 2020-03-03 PROBLEM — F32.A ANXIETY AND DEPRESSION: Status: ACTIVE | Noted: 2020-03-03

## 2020-03-03 PROBLEM — F33.0 DEPRESSION, MAJOR, RECURRENT, MILD (HCC): Status: ACTIVE | Noted: 2020-03-03

## 2020-03-03 PROBLEM — F41.9 ANXIETY AND DEPRESSION: Status: ACTIVE | Noted: 2020-03-03

## 2020-03-03 PROBLEM — M25.561 ACUTE PAIN OF RIGHT KNEE: Status: ACTIVE | Noted: 2020-03-03

## 2020-03-03 PROBLEM — L01.00 IMPETIGO: Status: ACTIVE | Noted: 2020-03-03

## 2020-03-03 PROBLEM — J20.8 ACUTE BRONCHITIS DUE TO OTHER SPECIFIED ORGANISMS: Status: ACTIVE | Noted: 2020-03-03

## 2020-03-03 PROBLEM — L73.9 FOLLICULITIS: Status: ACTIVE | Noted: 2020-03-03

## 2020-03-03 PROBLEM — R45.86 LABILE MOOD: Status: ACTIVE | Noted: 2020-03-03

## 2020-03-03 PROBLEM — E03.9 HYPOTHYROIDISM (ACQUIRED): Status: ACTIVE | Noted: 2020-03-03

## 2020-03-03 PROCEDURE — 99213 OFFICE O/P EST LOW 20 MIN: CPT | Performed by: FAMILY MEDICINE

## 2020-03-03 RX ORDER — KETOCONAZOLE 20 MG/G
CREAM TOPICAL DAILY
COMMUNITY
End: 2021-08-05

## 2020-03-03 NOTE — ASSESSMENT & PLAN NOTE
increase fluids, tylenol for fever, motrin for pain. Humidifier to help with congestion and to sleep at night. Dicussed OTC meds, gargle with warm salt water. If there is recurrent fever, shortness of breath, lethargy, advised to come in to the office or go to the ER.

## 2020-03-03 NOTE — PROGRESS NOTES
Subjective   Sourav Tamez is a 35 y.o. male.     Chief Complaint   Patient presents with   • URI       URI    This is a new problem. The current episode started in the past 7 days. The problem has been waxing and waning. There has been no fever. Associated symptoms include congestion, coughing (had a cough is now gone pt states and that he was coughing up mucus), headaches, a sore throat, swollen glands and wheezing (when coughing). Pertinent negatives include no chest pain, ear pain, neck pain, plugged ear sensation, rhinorrhea or sneezing. Associated symptoms comments: Had chills, and lower back would hurt  . He has tried inhaler use for the symptoms. The treatment provided no relief.        The following portions of the patient's history were reviewed and updated as appropriate: allergies, current medications, past family history, past medical history, past social history, past surgical history and problem list.    Allergies:  Allergies   Allergen Reactions   • Amoxicillin Swelling and Shortness Of Breath   • Doxycycline Swelling   • Escitalopram Swelling     Swelling of throat   • Fexofenadine Anaphylaxis   • Penicillins Shortness Of Breath   • Trazodone Swelling   • Codeine Myalgia   • Montelukast Swelling   • Latex Rash   • Mucinex [Guaifenesin Er] Cough   • Olanzapine Rash   • Robitussin (Alcohol Free) [Guaifenesin] Cough       Social History:  Social History     Socioeconomic History   • Marital status:      Spouse name: Not on file   • Number of children: Not on file   • Years of education: Not on file   • Highest education level: Not on file   Tobacco Use   • Smoking status: Never Smoker   • Smokeless tobacco: Never Used   Substance and Sexual Activity   • Alcohol use: Yes   • Drug use: No       Family History:  Family History   Problem Relation Age of Onset   • Hypertension Mother    • Hyperlipidemia Mother    • Hyperlipidemia Maternal Grandmother        Past Medical History :  Patient Active  Problem List   Diagnosis   • Perennial allergic rhinitis   • Cough   • ADD (attention deficit disorder)   • Asperger's syndrome   • High cholesterol   • Sliding hiatal hernia   • Acute bronchitis due to other specified organisms   • Acute pain of right knee   • Anxiety   • Depression, major, recurrent, mild (CMS/HCC)   • Folliculitis   • Gastroesophageal reflux disease without esophagitis   • Hypothyroidism (acquired)   • Screening for depression   • Substance abuse (CMS/HCC)   • Mild intermittent asthma without complication   • Labile mood   • Impetigo   • Viral upper respiratory tract infection       Medication List:    Current Outpatient Medications:   •  albuterol sulfate HFA (VENTOLIN HFA) 108 (90 Base) MCG/ACT inhaler, Take 2 puffs by mouth Every 4 (Four) Hours As Needed., Disp: , Rfl:   •  busPIRone (BUSPAR) 7.5 MG tablet, Take 1 tablet by mouth Every 12 (Twelve) Hours., Disp: , Rfl:   •  clotrimazole (LOTRIMIN) 1 % cream, Apply  topically to the appropriate area as directed 2 (Two) Times a Day., Disp: 45 g, Rfl: 3  •  fluticasone (FLONASE) 50 MCG/ACT nasal spray, USE 2 SPRAYS INTO THE NOSTRIL(S) AS DIRECTED BY PROVIDER DAILY, Disp: 48 g, Rfl: 0  •  fluticasone-salmeterol (ADVAIR DISKUS) 250-50 MCG/DOSE DISKUS, Inhale 1 puff 2 (Two) Times a Day., Disp: 1 each, Rfl: 0  •  ketoconazole (NIZORAL) 2 % cream, Daily., Disp: , Rfl:   •  lamoTRIgine (LaMICtal) 150 MG tablet, Take 150 mg by mouth Daily., Disp: , Rfl: 5  •  metoprolol succinate XL (TOPROL-XL) 25 MG 24 hr tablet, TAKE 1 TABLET BY MOUTH DAILY, Disp: 90 tablet, Rfl: 3  •  omeprazole (priLOSEC) 40 MG capsule, As Needed., Disp: , Rfl: 1  •  clotrimazole (LOTRIMIN) 1 % cream, RAKAN EXT AA BID, Disp: , Rfl: 1  •  montelukast (SINGULAIR) 10 MG tablet, TAKE 1 TABLET BY MOUTH EVERY NIGHT, Disp: 90 tablet, Rfl: 12    Past Surgical History:  Past Surgical History:   Procedure Laterality Date   • TONSILLECTOMY  1988       Review of Systems:  Review of Systems  "  Constitutional: Negative for chills and fever.   HENT: Positive for congestion, sore throat and swollen glands. Negative for ear pain, postnasal drip, rhinorrhea, sinus pressure, sneezing and trouble swallowing.    Eyes: Negative for pain, redness and itching.   Respiratory: Positive for cough (had a cough is now gone pt states and that he was coughing up mucus) and wheezing (when coughing).    Cardiovascular: Negative for chest pain.   Musculoskeletal: Negative for neck pain.       Physical Exam:  Vital Signs:  Visit Vitals  /78   Pulse 95   Temp 98.2 °F (36.8 °C)   Resp 18   Ht 172.7 cm (68\")   Wt 80.6 kg (177 lb 9.6 oz)   SpO2 98%   BMI 27.00 kg/m²       Physical Exam   Constitutional: He is oriented to person, place, and time. He appears well-developed and well-nourished. He is cooperative.   HENT:   Head: Normocephalic and atraumatic.   Right Ear: External ear normal. Tympanic membrane is not injected, not erythematous, not retracted and not bulging. No middle ear effusion.   Left Ear: External ear normal. Tympanic membrane is not injected, not erythematous, not retracted and not bulging.  No middle ear effusion.   Nose: Nose normal. No rhinorrhea.   Mouth/Throat: Oropharynx is clear and moist. No oropharyngeal exudate.   Cardiovascular: Normal rate, regular rhythm and normal heart sounds. Exam reveals no gallop and no friction rub.   No murmur heard.  Pulmonary/Chest: Effort normal and breath sounds normal. No respiratory distress. He has no wheezes. He has no rales.   Lymphadenopathy:     He has no cervical adenopathy.   Neurological: He is alert and oriented to person, place, and time. Coordination normal.   Skin: Skin is warm and dry.   Psychiatric: He has a normal mood and affect.   Vitals reviewed.      Assessment and Plan:  Problem List Items Addressed This Visit        Respiratory    Mild intermittent asthma without complication - Primary    Overview     Stable  No exacerbation         Viral " upper respiratory tract infection    Current Assessment & Plan     increase fluids, tylenol for fever, motrin for pain. Humidifier to help with congestion and to sleep at night. Dicussed OTC meds, gargle with warm salt water. If there is recurrent fever, shortness of breath, lethargy, advised to come in to the office or go to the ER.                 An After Visit Summary and PPPS were given to the patient.

## 2020-03-16 ENCOUNTER — OFFICE VISIT (OUTPATIENT)
Dept: FAMILY MEDICINE CLINIC | Facility: CLINIC | Age: 35
End: 2020-03-16

## 2020-03-16 VITALS
DIASTOLIC BLOOD PRESSURE: 78 MMHG | OXYGEN SATURATION: 98 % | BODY MASS INDEX: 28.42 KG/M2 | WEIGHT: 176.8 LBS | TEMPERATURE: 98 F | SYSTOLIC BLOOD PRESSURE: 120 MMHG | HEIGHT: 66 IN | RESPIRATION RATE: 16 BRPM | HEART RATE: 104 BPM

## 2020-03-16 DIAGNOSIS — J01.00 ACUTE NON-RECURRENT MAXILLARY SINUSITIS: Primary | ICD-10-CM

## 2020-03-16 PROBLEM — J20.8 ACUTE BRONCHITIS DUE TO OTHER SPECIFIED ORGANISMS: Status: RESOLVED | Noted: 2020-03-03 | Resolved: 2020-03-16

## 2020-03-16 PROBLEM — J06.9 VIRAL UPPER RESPIRATORY TRACT INFECTION: Status: RESOLVED | Noted: 2020-03-03 | Resolved: 2020-03-16

## 2020-03-16 PROCEDURE — 99213 OFFICE O/P EST LOW 20 MIN: CPT | Performed by: FAMILY MEDICINE

## 2020-03-16 RX ORDER — AZITHROMYCIN 250 MG/1
TABLET, FILM COATED ORAL
Qty: 6 TABLET | Refills: 0 | Status: SHIPPED | OUTPATIENT
Start: 2020-03-16 | End: 2020-06-25

## 2020-03-16 NOTE — PROGRESS NOTES
Subjective   Sourav Tamez is a 35 y.o. male.     Chief Complaint   Patient presents with   • URI       URI    This is a recurrent problem. The current episode started 1 to 4 weeks ago. The problem has been gradually worsening. There has been no fever. Associated symptoms include congestion, coughing, a plugged ear sensation and wheezing. Pertinent negatives include no abdominal pain, chest pain, diarrhea, ear pain, nausea, rash, rhinorrhea or vomiting. He has tried nothing for the symptoms.        The following portions of the patient's history were reviewed and updated as appropriate: allergies, current medications, past family history, past medical history, past social history, past surgical history and problem list.    Allergies:  Allergies   Allergen Reactions   • Amoxicillin Swelling and Shortness Of Breath   • Cetirizine Swelling   • Diphenhydramine Swelling   • Doxycycline Swelling   • Escitalopram Swelling     Swelling of throat   • Fexofenadine Anaphylaxis   • Loratadine Swelling   • Montelukast Swelling   • Penicillins Shortness Of Breath   • Prednisone Swelling   • Trazodone Swelling   • Codeine Myalgia   • Latex Rash   • Mucinex [Guaifenesin Er] Cough   • Olanzapine Rash   • Robitussin (Alcohol Free) [Guaifenesin] Cough       Social History:  Social History     Socioeconomic History   • Marital status:      Spouse name: Not on file   • Number of children: Not on file   • Years of education: Not on file   • Highest education level: Not on file   Tobacco Use   • Smoking status: Never Smoker   • Smokeless tobacco: Never Used   Substance and Sexual Activity   • Alcohol use: Yes   • Drug use: No       Family History:  Family History   Problem Relation Age of Onset   • Hypertension Mother    • Hyperlipidemia Mother    • Hyperlipidemia Maternal Grandmother        Past Medical History :  Patient Active Problem List   Diagnosis   • Perennial allergic rhinitis   • Cough   • ADD (attention deficit disorder)    • Asperger's syndrome   • High cholesterol   • Sliding hiatal hernia   • Acute pain of right knee   • Anxiety   • Depression, major, recurrent, mild (CMS/HCC)   • Folliculitis   • Gastroesophageal reflux disease without esophagitis   • Hypothyroidism (acquired)   • Screening for depression   • Substance abuse (CMS/HCC)   • Mild intermittent asthma without complication   • Labile mood   • Impetigo       Medication List:  Outpatient Encounter Medications as of 3/16/2020   Medication Sig Dispense Refill   • albuterol sulfate HFA (VENTOLIN HFA) 108 (90 Base) MCG/ACT inhaler Take 2 puffs by mouth Every 4 (Four) Hours As Needed.     • busPIRone (BUSPAR) 7.5 MG tablet Take 1 tablet by mouth Every 12 (Twelve) Hours.     • fluticasone (FLONASE) 50 MCG/ACT nasal spray USE 2 SPRAYS INTO THE NOSTRIL(S) AS DIRECTED BY PROVIDER DAILY 48 g 0   • fluticasone-salmeterol (ADVAIR DISKUS) 250-50 MCG/DOSE DISKUS Inhale 1 puff 2 (Two) Times a Day. 1 each 0   • ketoconazole (NIZORAL) 2 % cream Daily.     • lamoTRIgine (LaMICtal) 150 MG tablet Take 150 mg by mouth Daily.  5   • metoprolol succinate XL (TOPROL-XL) 25 MG 24 hr tablet TAKE 1 TABLET BY MOUTH DAILY 90 tablet 3   • omeprazole (priLOSEC) 40 MG capsule As Needed.  1   • azithromycin (ZITHROMAX) 250 MG tablet Take 2 tablets the first day, then 1 tablet daily for 4 days. 6 tablet 0   • clotrimazole (LOTRIMIN) 1 % cream RAKAN EXT AA BID  1   • clotrimazole (LOTRIMIN) 1 % cream Apply  topically to the appropriate area as directed 2 (Two) Times a Day. 45 g 3   • montelukast (SINGULAIR) 10 MG tablet TAKE 1 TABLET BY MOUTH EVERY NIGHT 90 tablet 12     No facility-administered encounter medications on file as of 3/16/2020.        Past Surgical History:  Past Surgical History:   Procedure Laterality Date   • TONSILLECTOMY  1988       Review of Systems:  Review of Systems   Constitutional: Negative for activity change and fever.   HENT: Positive for congestion. Negative for ear pain,  "rhinorrhea, sinus pressure and voice change.    Eyes: Negative for visual disturbance.   Respiratory: Positive for cough and wheezing. Negative for shortness of breath.    Cardiovascular: Negative for chest pain.   Gastrointestinal: Negative for abdominal pain, diarrhea, nausea and vomiting.   Endocrine: Negative for cold intolerance and heat intolerance.   Genitourinary: Negative for frequency and urgency.   Musculoskeletal: Negative for arthralgias.   Skin: Negative for rash.   Neurological: Negative for syncope.   Hematological: Does not bruise/bleed easily.   Psychiatric/Behavioral: Negative for depressed mood. The patient is not nervous/anxious.        I have reviewed and confirmed the accuracy of the ROS as documented by the MA/LPN/RN Judy Bassett MD    Vital Signs:  Visit Vitals  /78   Pulse 104   Temp 98 °F (36.7 °C)   Resp 16   Ht 167.6 cm (66\")   Wt 80.2 kg (176 lb 12.8 oz)   SpO2 98%   BMI 28.54 kg/m²       Physical Exam   Constitutional: He is oriented to person, place, and time. He appears well-developed and well-nourished. He is cooperative.   Cardiovascular: Normal rate, regular rhythm and normal heart sounds. Exam reveals no gallop and no friction rub.   No murmur heard.  Pulmonary/Chest: Effort normal and breath sounds normal. He has no wheezes. He has no rales.   Neurological: He is alert and oriented to person, place, and time. Coordination normal.   Skin: Skin is warm and dry.   Psychiatric: He has a normal mood and affect.   Vitals reviewed.      Assessment and Plan:  Problem List Items Addressed This Visit     None      Visit Diagnoses     Acute non-recurrent maxillary sinusitis    -  Primary    Relevant Medications    azithromycin (ZITHROMAX) 250 MG tablet        increase fluids, tylenol for fever, motrin for pain. Humidifier to help with congestion and to sleep at night. Dicussed OTC meds, gargle with warm salt water. If there is recurrent fever, shortness of breath, lethargy, " advised to come in to the office or go to the ER.      An After Visit Summary and PPPS were given to the patient.

## 2020-03-20 ENCOUNTER — TELEPHONE (OUTPATIENT)
Dept: FAMILY MEDICINE CLINIC | Facility: CLINIC | Age: 35
End: 2020-03-20

## 2020-03-24 ENCOUNTER — TELEPHONE (OUTPATIENT)
Dept: FAMILY MEDICINE CLINIC | Facility: CLINIC | Age: 35
End: 2020-03-24

## 2020-03-24 NOTE — TELEPHONE ENCOUNTER
----- Message from Sourav Tamez sent at 3/23/2020 12:56 PM EDT -----  Regarding: Non-Urgent Medical Question  Contact: [phone]  Giorgio it's Sourav I was just seen last Monday   And got a 5 pack medicine for my head congestion   And cough I now I was to take it on the Friday after my visit by I took it on Wednesday and yesterday   Was the 5th day I have no results of being better still have the runny nose and cough.     Was wanting to know if there was anything that I can get at [facility] to help clear up my head and cough I'm tired of felling this way my sinuses are sore from all of the blowing my nose     You can send me a message back through my chart or call me at [phone]

## 2020-04-27 RX ORDER — METOPROLOL SUCCINATE 25 MG/1
TABLET, EXTENDED RELEASE ORAL
Qty: 90 TABLET | Refills: 3 | Status: SHIPPED | OUTPATIENT
Start: 2020-04-27 | End: 2020-12-09

## 2020-06-25 ENCOUNTER — OFFICE VISIT (OUTPATIENT)
Dept: FAMILY MEDICINE CLINIC | Facility: CLINIC | Age: 35
End: 2020-06-25

## 2020-06-25 VITALS
BODY MASS INDEX: 30.15 KG/M2 | DIASTOLIC BLOOD PRESSURE: 70 MMHG | RESPIRATION RATE: 16 BRPM | HEIGHT: 66 IN | OXYGEN SATURATION: 97 % | HEART RATE: 95 BPM | TEMPERATURE: 98.6 F | SYSTOLIC BLOOD PRESSURE: 122 MMHG | WEIGHT: 187.6 LBS

## 2020-06-25 DIAGNOSIS — F33.0 DEPRESSION, MAJOR, RECURRENT, MILD (HCC): ICD-10-CM

## 2020-06-25 DIAGNOSIS — I47.1 PAROXYSMAL SVT (SUPRAVENTRICULAR TACHYCARDIA) (HCC): ICD-10-CM

## 2020-06-25 DIAGNOSIS — M79.672 ACUTE FOOT PAIN, LEFT: Primary | ICD-10-CM

## 2020-06-25 PROBLEM — I47.10 PAROXYSMAL SVT (SUPRAVENTRICULAR TACHYCARDIA): Status: ACTIVE | Noted: 2020-06-25

## 2020-06-25 PROCEDURE — 99213 OFFICE O/P EST LOW 20 MIN: CPT | Performed by: FAMILY MEDICINE

## 2020-06-25 RX ORDER — TRIAMCINOLONE ACETONIDE 55 UG/1
2 SPRAY, METERED NASAL DAILY
COMMUNITY

## 2020-06-25 NOTE — ASSESSMENT & PLAN NOTE
Exam wnl. Elevated ice and nsaids   Diagnosis, treatment and and course discussed. Potential side effects discussed. Return if there is worsening or persistence of symptoms.

## 2020-06-25 NOTE — PROGRESS NOTES
Subjective   Sourav Tamez is a 35 y.o. male.     Chief Complaint   Patient presents with   • Foot Pain       Foot Pain   This is a new problem. The current episode started in the past 7 days. The problem occurs constantly. The problem has been waxing and waning. Pertinent negatives include no abdominal pain, arthralgias, chest pain, coughing, fever, nausea, rash or vomiting. The symptoms are aggravated by walking and standing. He has tried acetaminophen, heat, ice and NSAIDs for the symptoms. The treatment provided no relief.   Depression   Visit Type: follow-up  Patient is not experiencing: depressed mood, excessive worry, feelings of worthlessness, nervousness/anxiety, shortness of breath, suicidal ideas and suicidal planning.  Frequency of symptoms: most days   Severity: moderate   Compliance with medications:  %             The following portions of the patient's history were reviewed and updated as appropriate: allergies, current medications, past family history, past medical history, past social history, past surgical history and problem list.    Allergies:  Allergies   Allergen Reactions   • Amoxicillin Swelling and Shortness Of Breath   • Cetirizine Swelling   • Diphenhydramine Swelling   • Doxycycline Swelling   • Escitalopram Swelling     Swelling of throat   • Fexofenadine Anaphylaxis   • Loratadine Swelling   • Montelukast Swelling   • Penicillins Shortness Of Breath   • Prednisone Swelling   • Trazodone Swelling   • Codeine Myalgia   • Latex Rash   • Mucinex [Guaifenesin Er] Cough   • Olanzapine Rash   • Robitussin (Alcohol Free) [Guaifenesin] Cough       Social History:  Social History     Socioeconomic History   • Marital status:      Spouse name: Not on file   • Number of children: Not on file   • Years of education: Not on file   • Highest education level: Not on file   Tobacco Use   • Smoking status: Never Smoker   • Smokeless tobacco: Never Used   Substance and Sexual Activity   •  Alcohol use: Yes   • Drug use: No       Family History:  Family History   Problem Relation Age of Onset   • Hypertension Mother    • Hyperlipidemia Mother    • Hyperlipidemia Maternal Grandmother        Past Medical History :  Patient Active Problem List   Diagnosis   • Perennial allergic rhinitis   • Cough   • ADD (attention deficit disorder)   • Asperger's syndrome   • High cholesterol   • Sliding hiatal hernia   • Acute pain of right knee   • Anxiety   • Depression, major, recurrent, mild (CMS/HCC)   • Folliculitis   • Gastroesophageal reflux disease without esophagitis   • Hypothyroidism (acquired)   • Screening for depression   • Substance abuse (CMS/[facility])   • Mild intermittent asthma without complication   • Labile mood   • Impetigo   • Acute foot pain, left   • Paroxysmal SVT (supraventricular tachycardia) (CMS/[facility])       Medication List:    Current Outpatient Medications:   •  albuterol sulfate HFA (VENTOLIN HFA) 108 (90 Base) MCG/ACT inhaler, Take 2 puffs by mouth Every 4 (Four) Hours As Needed., Disp: , Rfl:   •  busPIRone (BUSPAR) 7.5 MG tablet, Take 1 tablet by mouth Every 12 (Twelve) Hours., Disp: , Rfl:   •  clotrimazole (LOTRIMIN) 1 % cream, Apply  topically to the appropriate area as directed 2 (Two) Times a Day., Disp: 45 g, Rfl: 3  •  fluticasone-salmeterol (ADVAIR DISKUS) 250-50 MCG/DOSE DISKUS, Inhale 1 puff 2 (Two) Times a Day., Disp: 1 each, Rfl: 0  •  lamoTRIgine (LaMICtal) 150 MG tablet, Take 150 mg by mouth Daily., Disp: , Rfl: 5  •  metoprolol succinate XL (TOPROL-XL) 25 MG 24 hr tablet, TAKE 1 TABLET BY MOUTH EVERY DAY, Disp: 90 tablet, Rfl: 3  •  omeprazole (priLOSEC) 40 MG capsule, As Needed., Disp: , Rfl: 1  •  Triamcinolone Acetonide (Nasacort Allergy 24HR) 55 MCG/ACT nasal inhaler, 2 sprays into the nostril(s) as directed by provider Daily., Disp: , Rfl:   •  clotrimazole (LOTRIMIN) 1 % cream, RAKAN EXT AA BID, Disp: , Rfl: 1  •  ketoconazole (NIZORAL) 2 % cream, Daily., Disp: , Rfl:  "    Past Surgical History:  Past Surgical History:   Procedure Laterality Date   • TONSILLECTOMY  1988       Review of Systems:  Review of Systems   Constitutional: Negative for activity change and fever.   HENT: Negative for ear pain, rhinorrhea, sinus pressure and voice change.    Eyes: Negative for visual disturbance.   Respiratory: Negative for cough and shortness of breath.    Cardiovascular: Negative for chest pain.   Gastrointestinal: Negative for abdominal pain, diarrhea, nausea and vomiting.   Endocrine: Negative for cold intolerance and heat intolerance.   Genitourinary: Negative for frequency and urgency.   Musculoskeletal: Negative for arthralgias.   Skin: Negative for rash.   Neurological: Negative for syncope.   Hematological: Does not bruise/bleed easily.   Psychiatric/Behavioral: Negative for suicidal ideas and depressed mood. The patient is not nervous/anxious.        Physical Exam:  Vital Signs:  Visit Vitals  /70 (BP Location: Left arm)   Pulse 95   Temp 98.6 °F (37 °C) (Oral)   Resp 16   Ht 167.6 cm (66\")   Wt 85.1 kg (187 lb 9.6 oz)   SpO2 97%   BMI 30.28 kg/m²       Physical Exam   Constitutional: He is oriented to person, place, and time. He appears well-developed and well-nourished. He is cooperative.   Cardiovascular: Normal rate, regular rhythm and normal heart sounds. Exam reveals no gallop and no friction rub.   No murmur heard.  Pulmonary/Chest: Effort normal and breath sounds normal. He has no wheezes. He has no rales.   Neurological: He is alert and oriented to person, place, and time. Coordination normal.   Skin: Skin is warm and dry.   Psychiatric: He has a normal mood and affect.   Vitals reviewed.      Assessment and Plan:  Problem List Items Addressed This Visit        Cardiovascular and Mediastinum    Paroxysmal SVT (supraventricular tachycardia) (CMS/[facility])    Current Assessment & Plan     Stable.   Continue current treatment            Nervous and Auditory    Acute foot " pain, left - Primary    Current Assessment & Plan     Exam wnl. Elevated ice and nsaids   Diagnosis, treatment and and course discussed. Potential side effects discussed. Return if there is worsening or persistence of symptoms.               Other    Depression, major, recurrent, mild (CMS/HCC)    Overview     seeing psych  He is doing well with his medications               An After Visit Summary and PPPS were given to the patient.           I wore protective equipment throughout this patient encounter to include mask and gloves. Hand hygiene was performed before donning protective equipment and after removal when leaving the room.

## 2020-07-06 RX ORDER — BUSPIRONE HYDROCHLORIDE 7.5 MG/1
TABLET ORAL
Qty: 60 TABLET | Refills: 3 | Status: SHIPPED | OUTPATIENT
Start: 2020-07-06 | End: 2020-10-30

## 2020-07-06 RX ORDER — ALBUTEROL SULFATE 90 UG/1
AEROSOL, METERED RESPIRATORY (INHALATION)
Qty: 18 G | Refills: 3 | Status: SHIPPED | OUTPATIENT
Start: 2020-07-06 | End: 2022-09-19 | Stop reason: SDUPTHER

## 2020-07-13 ENCOUNTER — OFFICE VISIT (OUTPATIENT)
Dept: FAMILY MEDICINE CLINIC | Facility: CLINIC | Age: 35
End: 2020-07-13

## 2020-07-13 VITALS
HEIGHT: 67 IN | SYSTOLIC BLOOD PRESSURE: 124 MMHG | WEIGHT: 188.6 LBS | RESPIRATION RATE: 16 BRPM | HEART RATE: 97 BPM | OXYGEN SATURATION: 100 % | TEMPERATURE: 98 F | BODY MASS INDEX: 29.6 KG/M2 | DIASTOLIC BLOOD PRESSURE: 82 MMHG

## 2020-07-13 DIAGNOSIS — K59.04 CHRONIC IDIOPATHIC CONSTIPATION: ICD-10-CM

## 2020-07-13 DIAGNOSIS — E66.3 OVERWEIGHT: Primary | ICD-10-CM

## 2020-07-13 PROBLEM — K59.00 CONSTIPATION: Status: ACTIVE | Noted: 2020-07-13

## 2020-07-13 PROBLEM — Z00.01 ENCOUNTER FOR ROUTINE ADULT PHYSICAL EXAM WITH ABNORMAL FINDINGS: Status: ACTIVE | Noted: 2020-07-13

## 2020-07-13 PROCEDURE — 99214 OFFICE O/P EST MOD 30 MIN: CPT | Performed by: FAMILY MEDICINE

## 2020-07-13 RX ORDER — POLYETHYLENE GLYCOL 1000
17 POWDER (GRAM) MISCELLANEOUS DAILY
Qty: 527 BOTTLE | Refills: 1 | Status: SHIPPED | OUTPATIENT
Start: 2020-07-13 | End: 2021-04-09 | Stop reason: SDUPTHER

## 2020-07-13 NOTE — ASSESSMENT & PLAN NOTE
Increase fluids and fiber  Diagnosis, treatment and and course discussed. Potential side effects discussed. Return if there is worsening or persistence of symptoms.

## 2020-07-21 ENCOUNTER — OFFICE VISIT (OUTPATIENT)
Dept: FAMILY MEDICINE CLINIC | Facility: CLINIC | Age: 35
End: 2020-07-21

## 2020-07-21 VITALS
WEIGHT: 190 LBS | HEIGHT: 66 IN | OXYGEN SATURATION: 98 % | DIASTOLIC BLOOD PRESSURE: 78 MMHG | SYSTOLIC BLOOD PRESSURE: 128 MMHG | TEMPERATURE: 98.6 F | BODY MASS INDEX: 30.53 KG/M2 | HEART RATE: 104 BPM | RESPIRATION RATE: 18 BRPM

## 2020-07-21 DIAGNOSIS — K21.9 GASTROESOPHAGEAL REFLUX DISEASE WITHOUT ESOPHAGITIS: ICD-10-CM

## 2020-07-21 DIAGNOSIS — F33.0 DEPRESSION, MAJOR, RECURRENT, MILD (HCC): Primary | ICD-10-CM

## 2020-07-21 DIAGNOSIS — R53.83 OTHER FATIGUE: ICD-10-CM

## 2020-07-21 DIAGNOSIS — E78.2 MIXED HYPERLIPIDEMIA: ICD-10-CM

## 2020-07-21 DIAGNOSIS — E03.9 HYPOTHYROIDISM (ACQUIRED): ICD-10-CM

## 2020-07-21 DIAGNOSIS — Z00.00 MEDICARE ANNUAL WELLNESS VISIT, SUBSEQUENT: ICD-10-CM

## 2020-07-21 DIAGNOSIS — E78.00 HIGH CHOLESTEROL: Primary | ICD-10-CM

## 2020-07-21 PROBLEM — Z13.31 SCREENING FOR DEPRESSION: Status: RESOLVED | Noted: 2020-03-03 | Resolved: 2020-07-21

## 2020-07-21 PROBLEM — L73.9 FOLLICULITIS: Status: RESOLVED | Noted: 2020-03-03 | Resolved: 2020-07-21

## 2020-07-21 PROBLEM — M79.672 ACUTE FOOT PAIN, LEFT: Status: RESOLVED | Noted: 2020-06-25 | Resolved: 2020-07-21

## 2020-07-21 PROBLEM — L01.00 IMPETIGO: Status: RESOLVED | Noted: 2020-03-03 | Resolved: 2020-07-21

## 2020-07-21 PROBLEM — M25.561 ACUTE PAIN OF RIGHT KNEE: Status: RESOLVED | Noted: 2020-03-03 | Resolved: 2020-07-21

## 2020-07-21 PROBLEM — R05.9 COUGH: Status: RESOLVED | Noted: 2019-06-20 | Resolved: 2020-07-21

## 2020-07-21 LAB
BILIRUB BLD-MCNC: NEGATIVE MG/DL
CLARITY, POC: CLEAR
COLOR UR: YELLOW
GLUCOSE UR STRIP-MCNC: NEGATIVE MG/DL
KETONES UR QL: NEGATIVE
LEUKOCYTE EST, POC: NEGATIVE
NITRITE UR-MCNC: NEGATIVE MG/ML
PH UR: 5 [PH] (ref 5–8)
PROT UR STRIP-MCNC: ABNORMAL MG/DL
RBC # UR STRIP: NEGATIVE /UL
SP GR UR: 1.01 (ref 1–1.03)
UROBILINOGEN UR QL: NORMAL

## 2020-07-21 PROCEDURE — 81003 URINALYSIS AUTO W/O SCOPE: CPT | Performed by: FAMILY MEDICINE

## 2020-07-21 PROCEDURE — 99497 ADVNCD CARE PLAN 30 MIN: CPT | Performed by: FAMILY MEDICINE

## 2020-07-21 PROCEDURE — G0439 PPPS, SUBSEQ VISIT: HCPCS | Performed by: FAMILY MEDICINE

## 2020-07-21 PROCEDURE — 99213 OFFICE O/P EST LOW 20 MIN: CPT | Performed by: FAMILY MEDICINE

## 2020-07-21 RX ORDER — OMEPRAZOLE 40 MG/1
40 CAPSULE, DELAYED RELEASE ORAL DAILY
Qty: 30 CAPSULE | Refills: 3 | Status: SHIPPED | OUTPATIENT
Start: 2020-07-21 | End: 2020-07-23

## 2020-07-21 NOTE — PROGRESS NOTES
QUICK REFERENCE INFORMATION:  The ABCs of the Annual Wellness Visit    Subsequent Medicare Wellness Visit     HEALTH RISK ASSESSMENT    : 1985    Recent Hospitalizations:  No hospitalization(s) within the last year..  ccc    Current Medical Providers:  Patient Care Team:  Judy Bassett MD as PCP - General  Judy Bassett MD as PCP - Family Medicine  Judy Bassett MD as PCP - Claims Attributed    Smoking Status:  Social History     Tobacco Use   Smoking Status Never Smoker   Smokeless Tobacco Never Used       Alcohol Consumption:  Social History     Substance and Sexual Activity   Alcohol Use Yes       Depression Screen:   PHQ-2/PHQ-9 Depression Screening 2020   Little interest or pleasure in doing things 0   Feeling down, depressed, or hopeless 0   Trouble falling or staying asleep, or sleeping too much 1   Feeling tired or having little energy 3   Poor appetite or overeating 3   Feeling bad about yourself - or that you are a failure or have let yourself or your family down 0   Trouble concentrating on things, such as reading the newspaper or watching television 0   Moving or speaking so slowly that other people could have noticed. Or the opposite - being so fidgety or restless that you have been moving around a lot more than usual 0   Thoughts that you would be better off dead, or of hurting yourself in some way 0   Total Score 7   If you checked off any problems, how difficult have these problems made it for you to do your work, take care of things at home, or get along with other people? Somewhat difficult     We spent more than 16 minutes asking patient questions, counseling and documenting in the chart.    Health Habits and Functional and Cognitive Screening:  Functional & Cognitive Status 2020   Do you have difficulty preparing food and eating? No   Do you have difficulty bathing yourself, getting dressed or grooming yourself? No   Do you have difficulty using the toilet? No   Do  you have difficulty moving around from place to place? No   Do you have trouble with steps or getting out of a bed or a chair? No   Current Diet Unhealthy Diet   Dental Exam Not up to date   Eye Exam Up to date   Exercise (times per week) 1 times per week   Current Exercise Activities Include Tapes/CDs/TV Fitness Programs at Home   Do you need help using the phone?  No   Are you deaf or do you have serious difficulty hearing?  No   Do you need help with transportation? Yes   Do you need help shopping? No   Do you need help preparing meals?  No   Do you need help with housework?  No   Do you need help with laundry? No   Do you need help taking your medications? No   Do you need help managing money? No   Do you ever drive or ride in a car without wearing a seat belt? No   Have you felt unusual stress, anger or loneliness in the last month? Yes   Who do you live with? Spouse   If you need help, do you have trouble finding someone available to you? No   Have you been bothered in the last four weeks by sexual problems? No   Do you have difficulty concentrating, remembering or making decisions? No       Does the patient have evidence of cognitive impairment? No    Aspirin use counseling: Does not need ASA (and currently is not on it)    Recent Lab Results:    Lab Results   Component Value Date    GLU 99 07/21/2020        Lab Results   Component Value Date    CHOL 192 03/28/2019    TRIG 124 07/21/2020    HDL 38 (L) 07/21/2020    VLDL 25 07/21/2020       Age-appropriate Screening Schedule:  Refer to the list below for future screening recommendations based on patient's age, sex and/or medical conditions. Orders for these recommended tests are listed in the plan section. The patient has been provided with a written plan.    Health Maintenance   Topic Date Due   • TDAP/TD VACCINES (1 - Tdap) 01/11/1994   • INFLUENZA VACCINE  08/01/2020   • LIPID PANEL  07/21/2021        Subjective   History of Present Illness    Sourav Tamez  is a 35 y.o. male who presents for an Annual Wellness Visit.  Hyperlipidemia   This is a chronic problem. The current episode started more than 1 year ago. The problem is uncontrolled. Exacerbating diseases include hypothyroidism. Factors aggravating his hyperlipidemia include beta blockers. Pertinent negatives include no chest pain, leg pain or shortness of breath. He is currently on no antihyperlipidemic treatment. There are no compliance problems.  Risk factors for coronary artery disease include male sex and dyslipidemia.   Hypothyroidism   This is a chronic problem. The current episode started more than 1 year ago. The problem occurs constantly. The problem has been unchanged. Pertinent negatives include no abdominal pain, arthralgias, chest pain, coughing, fever, nausea, rash or vomiting. Nothing aggravates the symptoms. Treatments tried: synthroid. The treatment provided moderate relief.   Heartburn   He reports no abdominal pain, no chest pain, no coughing or no nausea. This is a chronic problem. The current episode started more than 1 year ago. The problem occurs occasionally. The problem has been unchanged. The symptoms are aggravated by certain foods. He has tried a PPI for the symptoms. The treatment provided significant relief.   Depression   Visit Type: follow-up  Patient is not experiencing: depressed mood, fatigue, feelings of hopelessness, feelings of worthlessness, irritability, malaise, nervousness/anxiety, shortness of breath, suicidal ideas and suicidal planning.  Frequency of symptoms: occasionally   Severity: moderate   Sleep quality: fair  Compliance with medications:  %            The following portions of the patient's history were reviewed and updated as appropriate: allergies, current medications, past family history, past medical history, past social history, past surgical history and problem list.    Outpatient Medications Prior to Visit   Medication Sig Dispense Refill   •  ALBUTEROL SULFATE  (90 Base) MCG/ACT inhaler INHALE 2 PUFFS BY MOUTH EVERY 4 TO 6 HOURS AS NEEDED. MAX 12 PUFFS DAILY. 18 g 3   • busPIRone (BUSPAR) 7.5 MG tablet TAKE 1 TABLET BY MOUTH DAILY FOR 7 DAYS, THEN 1 TABLET TWICE DAILY 60 tablet 3   • clotrimazole (LOTRIMIN) 1 % cream Apply  topically to the appropriate area as directed 2 (Two) Times a Day. 45 g 3   • fluticasone-salmeterol (ADVAIR DISKUS) 250-50 MCG/DOSE DISKUS Inhale 1 puff 2 (Two) Times a Day. 1 each 0   • ketoconazole (NIZORAL) 2 % cream Daily.     • lamoTRIgine (LaMICtal) 150 MG tablet Take 150 mg by mouth Daily.  5   • metoprolol succinate XL (TOPROL-XL) 25 MG 24 hr tablet TAKE 1 TABLET BY MOUTH EVERY DAY 90 tablet 3   • Polyethylene Glycol 1000 powder 17 g Daily. 527 bottle 1   • Psyllium (METAMUCIL FIBER PO) Take  by mouth.     • Triamcinolone Acetonide (Nasacort Allergy 24HR) 55 MCG/ACT nasal inhaler 2 sprays into the nostril(s) as directed by provider Daily.     • omeprazole (priLOSEC) 40 MG capsule As Needed.  1     No facility-administered medications prior to visit.        Patient Active Problem List   Diagnosis   • Perennial allergic rhinitis   • ADD (attention deficit disorder)   • Asperger's syndrome   • Mixed hyperlipidemia   • Sliding hiatal hernia   • Anxiety   • Depression, major, recurrent, mild (CMS/HCC)   • Gastroesophageal reflux disease without esophagitis   • Hypothyroidism (acquired)   • Substance abuse (CMS/HCC)   • Mild intermittent asthma without complication   • Labile mood   • Paroxysmal SVT (supraventricular tachycardia) (CMS/HCC)   • Encounter for routine adult physical exam with abnormal findings   • Chronic idiopathic constipation   • Overweight       Advance Care Planning:  ACP discussion was held with the patient during this visit. Patient does not have an advance directive, information provided.     A face-to-face visit was completed today with patient.  Counseling explanation, and discussion of advanced  directives was performed.   The last advanced care visit was performed in 2019.  In a near life ending situation, from which he is not expected to recover functionally, and he is not able to speak for him, he does not want life sustaining measures. We discussed feeding tubes, ventilators and cardiac support as well as dialysis.    We spent more than 16 minutes discussing Advanced Care Planning information and documenting in the chart.      Identification of Risk Factors:  Risk factors include: Advance Directive Discussion.    Review of Systems   Constitutional: Negative for activity change, fever and irritability.   HENT: Negative for ear pain, rhinorrhea, sinus pressure and voice change.    Eyes: Negative for visual disturbance.   Respiratory: Negative for cough and shortness of breath.    Cardiovascular: Negative for chest pain.   Gastrointestinal: Negative for abdominal pain, diarrhea, nausea and vomiting.   Endocrine: Negative for cold intolerance and heat intolerance.   Genitourinary: Negative for frequency and urgency.   Musculoskeletal: Negative for arthralgias.   Skin: Negative for rash.   Neurological: Negative for syncope.   Hematological: Does not bruise/bleed easily.   Psychiatric/Behavioral: Negative for suicidal ideas and depressed mood. The patient is not nervous/anxious.          Compared to one year ago, the patient feels his physical health is the same.  Compared to one year ago, the patient feels his mental health is the same.    Objective     Physical Exam   Constitutional: He is oriented to person, place, and time. He appears well-developed and well-nourished. No distress.   HENT:   Head: Normocephalic and atraumatic.   Right Ear: External ear normal.   Left Ear: External ear normal.   Nose: Nose normal.   Mouth/Throat: Oropharynx is clear and moist. No oropharyngeal exudate.   Eyes: Pupils are equal, round, and reactive to light. Conjunctivae and EOM are normal. Right eye exhibits no discharge.  "Left eye exhibits no discharge. No scleral icterus.   Neck: Normal range of motion. Neck supple. No tracheal deviation present. No thyromegaly present.   Cardiovascular: Normal rate, regular rhythm, normal heart sounds and intact distal pulses. Exam reveals no gallop and no friction rub.   No murmur heard.  Pulmonary/Chest: Effort normal and breath sounds normal. No stridor. No respiratory distress. He has no wheezes. He has no rales.   Abdominal: Soft. Bowel sounds are normal. He exhibits no distension and no mass. There is no tenderness. There is no rebound and no guarding. Hernia confirmed negative in the right inguinal area and confirmed negative in the left inguinal area.   Genitourinary: Testes normal and penis normal. Right testis shows no mass, no swelling and no tenderness. Left testis shows no mass, no swelling and no tenderness. No penile tenderness. No discharge found.   Musculoskeletal: Normal range of motion. He exhibits no edema, tenderness or deformity.   Neurological: He is alert and oriented to person, place, and time. He has normal strength and normal reflexes. He is not disoriented. He displays no atrophy, no tremor and normal reflexes. No cranial nerve deficit or sensory deficit. He exhibits normal muscle tone. Coordination and gait normal.   Skin: Skin is warm and dry. Capillary refill takes less than 2 seconds. No rash noted. He is not diaphoretic. No erythema. No pallor.   Psychiatric: He has a normal mood and affect. His behavior is normal. Judgment and thought content normal.   Vitals reviewed.      Vitals:    07/21/20 1503   BP: 128/78   Pulse: 104   Resp: 18   Temp: 98.6 °F (37 °C)   SpO2: 98%   Weight: 86.2 kg (190 lb)   Height: 167.6 cm (66\")       Patient's Body mass index is 30.67 kg/m². BMI is within normal parameters. No follow-up required..      Assessment/Plan   Patient Self-Management and Personalized Health Advice  The patient has been provided with information about: diet, " exercise and designing advance directives and preventive services including:   · Annual Wellness Visit (AWV).    Visit Diagnoses:    Problem List Items Addressed This Visit        Cardiovascular and Mediastinum    Mixed hyperlipidemia       Digestive    Gastroesophageal reflux disease without esophagitis     He has seen GI  Refill medication            Endocrine    Hypothyroidism (acquired)       Other    Depression, major, recurrent, mild (CMS/HCC) - Primary     He sees a psychiatrist  Doing well with his symptoms and with his medications  Continue with current treatment           Other Visit Diagnoses     Medicare annual wellness visit, subsequent        Other fatigue         Relevant Orders    POC Urinalysis Dipstick, Multipro (Completed)           Reviewed use of high risk medication in the elderly: not applicable  Reviewed for potential of harmful drug interactions in the elderly: not applicable    Follow Up:  No follow-ups on file.     An After Visit Summary and PPPS with all of these plans were given to the patient.           Discussed wearing sunscreen, seatbelts. Avoidance or caution with alcohol. Discussed screening as appropriate for age.     I wore protective equipment throughout this patient encounter to include mask and gloves. Hand hygiene was performed before donning protective equipment and after removal when leaving the room.

## 2020-07-22 LAB
ALBUMIN SERPL-MCNC: 4.5 G/DL (ref 4–5)
ALBUMIN/GLOB SERPL: 2 {RATIO} (ref 1.2–2.2)
ALP SERPL-CCNC: 65 IU/L (ref 39–117)
ALT SERPL-CCNC: 38 IU/L (ref 0–44)
AST SERPL-CCNC: 21 IU/L (ref 0–40)
BASOPHILS # BLD AUTO: 0 X10E3/UL (ref 0–0.2)
BASOPHILS NFR BLD AUTO: 1 %
BILIRUB SERPL-MCNC: 0.2 MG/DL (ref 0–1.2)
BUN SERPL-MCNC: 15 MG/DL (ref 6–20)
BUN/CREAT SERPL: 15 (ref 9–20)
CALCIUM SERPL-MCNC: 9.1 MG/DL (ref 8.7–10.2)
CHLORIDE SERPL-SCNC: 106 MMOL/L (ref 96–106)
CHOLEST SERPL-MCNC: 168 MG/DL (ref 100–199)
CHOLEST/HDLC SERPL: 4.4 RATIO (ref 0–5)
CO2 SERPL-SCNC: 23 MMOL/L (ref 20–29)
CREAT SERPL-MCNC: 0.97 MG/DL (ref 0.76–1.27)
EOSINOPHIL # BLD AUTO: 0.3 X10E3/UL (ref 0–0.4)
EOSINOPHIL NFR BLD AUTO: 5 %
ERYTHROCYTE [DISTWIDTH] IN BLOOD BY AUTOMATED COUNT: 12.8 % (ref 11.6–15.4)
GLOBULIN SER CALC-MCNC: 2.3 G/DL (ref 1.5–4.5)
GLUCOSE SERPL-MCNC: 99 MG/DL (ref 65–99)
HCT VFR BLD AUTO: 46 % (ref 37.5–51)
HDLC SERPL-MCNC: 38 MG/DL
HGB BLD-MCNC: 15.7 G/DL (ref 13–17.7)
IMM GRANULOCYTES # BLD AUTO: 0 X10E3/UL (ref 0–0.1)
IMM GRANULOCYTES NFR BLD AUTO: 0 %
LDLC SERPL CALC-MCNC: 105 MG/DL (ref 0–99)
LYMPHOCYTES # BLD AUTO: 1.6 X10E3/UL (ref 0.7–3.1)
LYMPHOCYTES NFR BLD AUTO: 24 %
MCH RBC QN AUTO: 30.4 PG (ref 26.6–33)
MCHC RBC AUTO-ENTMCNC: 34.1 G/DL (ref 31.5–35.7)
MCV RBC AUTO: 89 FL (ref 79–97)
MONOCYTES # BLD AUTO: 0.5 X10E3/UL (ref 0.1–0.9)
MONOCYTES NFR BLD AUTO: 8 %
NEUTROPHILS # BLD AUTO: 4.1 X10E3/UL (ref 1.4–7)
NEUTROPHILS NFR BLD AUTO: 62 %
PLATELET # BLD AUTO: 191 X10E3/UL (ref 150–450)
POTASSIUM SERPL-SCNC: 4 MMOL/L (ref 3.5–5.2)
PROT SERPL-MCNC: 6.8 G/DL (ref 6–8.5)
RBC # BLD AUTO: 5.16 X10E6/UL (ref 4.14–5.8)
SODIUM SERPL-SCNC: 143 MMOL/L (ref 134–144)
TRIGL SERPL-MCNC: 124 MG/DL (ref 0–149)
TSH SERPL DL<=0.005 MIU/L-ACNC: 6.38 UIU/ML (ref 0.45–4.5)
VLDLC SERPL CALC-MCNC: 25 MG/DL (ref 5–40)
WBC # BLD AUTO: 6.6 X10E3/UL (ref 3.4–10.8)

## 2020-07-23 RX ORDER — OMEPRAZOLE 40 MG/1
40 CAPSULE, DELAYED RELEASE ORAL DAILY
Qty: 90 CAPSULE | Refills: 0 | Status: SHIPPED | OUTPATIENT
Start: 2020-07-23 | End: 2020-11-05

## 2020-07-27 NOTE — ASSESSMENT & PLAN NOTE
He sees a psychiatrist  Doing well with his symptoms and with his medications  Continue with current treatment

## 2020-07-31 ENCOUNTER — OFFICE VISIT (OUTPATIENT)
Dept: FAMILY MEDICINE CLINIC | Facility: CLINIC | Age: 35
End: 2020-07-31

## 2020-07-31 VITALS
WEIGHT: 189.6 LBS | BODY MASS INDEX: 29.76 KG/M2 | SYSTOLIC BLOOD PRESSURE: 138 MMHG | HEART RATE: 95 BPM | DIASTOLIC BLOOD PRESSURE: 86 MMHG | TEMPERATURE: 98.6 F | RESPIRATION RATE: 18 BRPM | OXYGEN SATURATION: 98 % | HEIGHT: 67 IN

## 2020-07-31 DIAGNOSIS — E78.2 MIXED HYPERLIPIDEMIA: Primary | ICD-10-CM

## 2020-07-31 DIAGNOSIS — E03.9 HYPOTHYROIDISM (ACQUIRED): ICD-10-CM

## 2020-07-31 PROCEDURE — 99213 OFFICE O/P EST LOW 20 MIN: CPT | Performed by: FAMILY MEDICINE

## 2020-07-31 NOTE — PROGRESS NOTES
Subjective   Sourav Tamez is a 35 y.o. male.     Chief Complaint   Patient presents with   • Hyperlipidemia       TSH was slightly elevated    Hyperlipidemia   This is a chronic problem. The current episode started more than 1 year ago. Recent lipid tests were reviewed and are low. Exacerbating diseases include hypothyroidism. Factors aggravating his hyperlipidemia include beta blockers. Pertinent negatives include no chest pain, focal weakness, leg pain or shortness of breath. He is currently on no antihyperlipidemic treatment. There are no compliance problems.  Risk factors for coronary artery disease include male sex.   Hypothyroidism   Pertinent negatives include no abdominal pain, arthralgias, chest pain, coughing, fever, nausea, rash or vomiting.        The following portions of the patient's history were reviewed and updated as appropriate: allergies, current medications, past family history, past medical history, past social history, past surgical history and problem list.    Allergies:  Allergies   Allergen Reactions   • Amoxicillin Swelling and Shortness Of Breath   • Cetirizine Swelling   • Diphenhydramine Swelling   • Doxycycline Swelling   • Escitalopram Swelling     Swelling of throat   • Fexofenadine Anaphylaxis   • Loratadine Swelling   • Montelukast Swelling   • Penicillins Shortness Of Breath   • Prednisone Swelling   • Trazodone Swelling   • Codeine Myalgia   • Latex Rash   • Mucinex [Guaifenesin Er] Cough   • Olanzapine Rash   • Robitussin (Alcohol Free) [Guaifenesin] Cough       Social History:  Social History     Socioeconomic History   • Marital status:      Spouse name: Not on file   • Number of children: Not on file   • Years of education: Not on file   • Highest education level: Not on file   Tobacco Use   • Smoking status: Never Smoker   • Smokeless tobacco: Never Used   Substance and Sexual Activity   • Alcohol use: Yes   • Drug use: No       Family History:  Family History    Problem Relation Age of Onset   • Hypertension Mother    • Hyperlipidemia Mother    • Hyperlipidemia Maternal Grandmother        Past Medical History :  Patient Active Problem List   Diagnosis   • Perennial allergic rhinitis   • ADD (attention deficit disorder)   • Asperger's syndrome   • Mixed hyperlipidemia   • Sliding hiatal hernia   • Anxiety   • Depression, major, recurrent, mild (CMS/HCC)   • Gastroesophageal reflux disease without esophagitis   • Hypothyroidism (acquired)   • Substance abuse (CMS/HCC)   • Mild intermittent asthma without complication   • Labile mood   • Paroxysmal SVT (supraventricular tachycardia) (CMS/[facility])   • Encounter for routine adult physical exam with abnormal findings   • Chronic idiopathic constipation   • Overweight       Medication List:    Current Outpatient Medications:   •  ALBUTEROL SULFATE  (90 Base) MCG/ACT inhaler, INHALE 2 PUFFS BY MOUTH EVERY 4 TO 6 HOURS AS NEEDED. MAX 12 PUFFS DAILY., Disp: 18 g, Rfl: 3  •  busPIRone (BUSPAR) 7.5 MG tablet, TAKE 1 TABLET BY MOUTH DAILY FOR 7 DAYS, THEN 1 TABLET TWICE DAILY, Disp: 60 tablet, Rfl: 3  •  clotrimazole (LOTRIMIN) 1 % cream, Apply  topically to the appropriate area as directed 2 (Two) Times a Day., Disp: 45 g, Rfl: 3  •  fluticasone-salmeterol (ADVAIR DISKUS) 250-50 MCG/DOSE DISKUS, Inhale 1 puff 2 (Two) Times a Day., Disp: 1 each, Rfl: 0  •  ketoconazole (NIZORAL) 2 % cream, Daily., Disp: , Rfl:   •  lamoTRIgine (LaMICtal) 150 MG tablet, Take 150 mg by mouth Daily., Disp: , Rfl: 5  •  metoprolol succinate XL (TOPROL-XL) 25 MG 24 hr tablet, TAKE 1 TABLET BY MOUTH EVERY DAY, Disp: 90 tablet, Rfl: 3  •  omeprazole (priLOSEC) 40 MG capsule, TAKE 1 CAPSULE BY MOUTH DAILY, Disp: 90 capsule, Rfl: 0  •  Polyethylene Glycol 1000 powder, 17 g Daily., Disp: 527 bottle, Rfl: 1  •  Psyllium (METAMUCIL FIBER PO), Take  by mouth., Disp: , Rfl:   •  Triamcinolone Acetonide (Nasacort Allergy 24HR) 55 MCG/ACT nasal inhaler, 2 sprays  "into the nostril(s) as directed by provider Daily., Disp: , Rfl:     Past Surgical History:  Past Surgical History:   Procedure Laterality Date   • TONSILLECTOMY  1988       Review of Systems:  Review of Systems   Constitutional: Negative for activity change and fever.   HENT: Negative for ear pain, rhinorrhea, sinus pressure and voice change.    Eyes: Negative for visual disturbance.   Respiratory: Negative for cough and shortness of breath.    Cardiovascular: Negative for chest pain.   Gastrointestinal: Negative for abdominal pain, diarrhea, nausea and vomiting.   Endocrine: Negative for cold intolerance and heat intolerance.   Genitourinary: Negative for frequency and urgency.   Musculoskeletal: Negative for arthralgias.   Skin: Negative for rash.   Neurological: Negative for focal weakness and syncope.   Hematological: Does not bruise/bleed easily.   Psychiatric/Behavioral: Negative for depressed mood. The patient is not nervous/anxious.        Physical Exam:  Vital Signs:  Visit Vitals  /86   Pulse 95   Temp 98.6 °F (37 °C)   Resp 18   Ht 170.2 cm (67\")   Wt 86 kg (189 lb 9.6 oz)   SpO2 98%   BMI 29.70 kg/m²       Physical Exam   Constitutional: He is oriented to person, place, and time. He appears well-developed and well-nourished. He is cooperative.   Cardiovascular: Normal rate, regular rhythm and normal heart sounds. Exam reveals no gallop and no friction rub.   No murmur heard.  Pulmonary/Chest: Effort normal and breath sounds normal. He has no wheezes. He has no rales.   Neurological: He is alert and oriented to person, place, and time. Coordination normal.   Skin: Skin is warm and dry.   Psychiatric: He has a normal mood and affect.   Vitals reviewed.      Assessment and Plan:  Problem List Items Addressed This Visit        Cardiovascular and Mediastinum    Mixed hyperlipidemia - Primary       Endocrine    Hypothyroidism (acquired)    Overview     recheck today         Relevant Orders    TSH    "       An After Visit Summary and PPPS were given to the patient.             I wore protective equipment throughout this patient encounter to include mask and gloves. Hand hygiene was performed before donning protective equipment and after removal when leaving the room.

## 2020-08-20 ENCOUNTER — OFFICE VISIT (OUTPATIENT)
Dept: PODIATRY | Facility: CLINIC | Age: 35
End: 2020-08-20

## 2020-08-20 VITALS
HEIGHT: 67 IN | DIASTOLIC BLOOD PRESSURE: 75 MMHG | SYSTOLIC BLOOD PRESSURE: 111 MMHG | WEIGHT: 185 LBS | BODY MASS INDEX: 29.03 KG/M2 | HEART RATE: 80 BPM

## 2020-08-20 DIAGNOSIS — M79.675 GREAT TOE PAIN, LEFT: Primary | ICD-10-CM

## 2020-08-20 DIAGNOSIS — L60.0 ONYCHOCRYPTOSIS: ICD-10-CM

## 2020-08-20 PROCEDURE — 99203 OFFICE O/P NEW LOW 30 MIN: CPT | Performed by: PODIATRIST

## 2020-08-21 NOTE — PROGRESS NOTES
08/20/2020  Foot and Ankle Surgery - New Patient   Provider: Dr. Sanchez Gomez DPM  Location: [facility]    Subjective:  Sourav Tamez is a 35 y.o. male.     Chief Complaint   Patient presents with   • Left Foot - Ingrown Toenail       HPI: Patient is a 35-year-old male that presents with intermittent issues involving his left great toe.  He complains of pain with certain shoes and activity.  He feels that the symptoms are fairly mild at this time.  He isolates the discomfort to the medial aspect of the toe.  He has not noticed any significant redness, drainage, or other concerning features.  Symptoms have been intermittently present for several months.  He rates the discomfort a 2 out of 10 when noticed.  He has not had this issue addressed in the past.  No other complaints with his feet    Allergies   Allergen Reactions   • Amoxicillin Swelling and Shortness Of Breath   • Cetirizine Swelling   • Diphenhydramine Swelling   • Doxycycline Swelling   • Escitalopram Swelling     Swelling of throat   • Fexofenadine Anaphylaxis   • Loratadine Swelling   • Montelukast Swelling   • Penicillins Shortness Of Breath   • Prednisone Swelling   • Trazodone Swelling   • Codeine Myalgia   • Latex Rash   • Mucinex [Guaifenesin Er] Cough   • Olanzapine Rash   • Robitussin (Alcohol Free) [Guaifenesin] Cough       Past Medical History:   Diagnosis Date   • Acute bronchitis due to other specified organisms     Impression: Increase fluids. Tylenol/motrin for pain or fever. Medication and medication adverse effects discussed. Follow-up 5-7 days for reevaluation if not improved or sooner if needed. Allergic component. Start prednisone.   • Acute non-recurrent maxillary sinusitis     Impression: mild. Increase fluids. Tylenol/motrin for pain or fever. Medication and medication adverse effects discussed. Follow-up 5-7 days for reevaluation if not improved or sooner if needed.   • Acute non-seasonal allergic rhinitis      Impression: With post nasal drip. Cause of URI symptoms antihistamines discussed Diagnosis, treatment and course discussed. Discussed medication, dosing and adverse effects. Return if there is worsening or persistance of symptoms   • Acute pain of right knee     Impression: discussed nsaids and stretches exam was negative strain most likely   • Advance care planning    • Alcohol screening    • Anxiety and depression     Impression: Good social support, no suicidal or homicidal ideation. Diagnosis and treatment discussed. Discussed counseling. Discussed medication, dosing and adverse effects. Return in 4 weeks   • Asperger syndrome     Impression: stable 9/24/18   • Cough     Impression: Check CXR   • Depression, major, recurrent, mild (CMS/[facility])     Impression: seeing psych   • External hemorrhoid     Impression: none bleeding now and small discussed home care   • Folliculitis     Impression: Diagnosis, treatment and course discussed. Discussed medication, dosing and adverse effects. Return if there is worsening or persistance of symptoms   • Gastroesophageal reflux disease without esophagitis     Impression: he has seen GI. refill medication   • Hiatal hernia    • Hypothyroidism     Impression: recheck today   • Impetigo    • Labile mood     Impression: question if he has some bipolar disorder? Given phone number.   • Medicare annual wellness visit, subsequent     With abnormal findings.    • Mild intermittent asthma with (acute) exacerbation     Impression: mildly exacerbated. no wheeze on exam   • Mixed hyperlipidemia     Impression: will check labs when fasting.   • Overweight (BMI 25.0-29.9)    • Right hip pain     Impression: discussed nsaids and stretches exam was negative strain most likely   • Screening for depression    • Screening PSA (prostate specific antigen)    • Substance abuse (CMS/[facility])     Story: multiple psych meds       Past Surgical History:   Procedure Laterality Date   • TONSILLECTOMY  1988        Family History   Problem Relation Age of Onset   • Hypertension Mother    • Hyperlipidemia Mother    • Hyperlipidemia Maternal Grandmother        Social History     Socioeconomic History   • Marital status:      Spouse name: Not on file   • Number of children: Not on file   • Years of education: Not on file   • Highest education level: Not on file   Tobacco Use   • Smoking status: Never Smoker   • Smokeless tobacco: Never Used   Substance and Sexual Activity   • Alcohol use: Yes   • Drug use: No   • Sexual activity: Defer        Current Outpatient Medications on File Prior to Visit   Medication Sig Dispense Refill   • ALBUTEROL SULFATE  (90 Base) MCG/ACT inhaler INHALE 2 PUFFS BY MOUTH EVERY 4 TO 6 HOURS AS NEEDED. MAX 12 PUFFS DAILY. 18 g 3   • busPIRone (BUSPAR) 7.5 MG tablet TAKE 1 TABLET BY MOUTH DAILY FOR 7 DAYS, THEN 1 TABLET TWICE DAILY 60 tablet 3   • clotrimazole (LOTRIMIN) 1 % cream Apply  topically to the appropriate area as directed 2 (Two) Times a Day. 45 g 3   • fluticasone-salmeterol (ADVAIR DISKUS) 250-50 MCG/DOSE DISKUS Inhale 1 puff 2 (Two) Times a Day. 1 each 0   • ketoconazole (NIZORAL) 2 % cream Daily.     • lamoTRIgine (LaMICtal) 150 MG tablet Take 150 mg by mouth Daily.  5   • metoprolol succinate XL (TOPROL-XL) 25 MG 24 hr tablet TAKE 1 TABLET BY MOUTH EVERY DAY 90 tablet 3   • omeprazole (priLOSEC) 40 MG capsule TAKE 1 CAPSULE BY MOUTH DAILY 90 capsule 0   • Polyethylene Glycol 1000 powder 17 g Daily. 527 bottle 1   • Psyllium (METAMUCIL FIBER PO) Take  by mouth.     • Triamcinolone Acetonide (Nasacort Allergy 24HR) 55 MCG/ACT nasal inhaler 2 sprays into the nostril(s) as directed by provider Daily.       No current facility-administered medications on file prior to visit.        Review of Systems:  General: Denies fever, chills, fatigue, and weakness.  Eyes: Denies vision loss, blurry vision, and excessive redness.  ENT: Denies hearing issues and difficulty  "swallowing.  Cardiovascular: Denies palpitations, chest pain, or syncopal episodes.  Respiratory: Denies shortness of breath, wheezing, and coughing.  GI: Denies abdominal pain, nausea, and vomiting.   : Denies frequency, hematuria, and urgency.  Musculoskeletal: Denies muscle cramps, joint pains, and stiffness.  Derm: + Intermittent left toenail pain  Neuro: Denies headaches, numbness, loss of coordination, and tremors.  Psych: Denies anxiety and depression.  Endocrine: Denies temperature intolerance and changes in appetite.  Heme: Denies bleeding disorders or abnormal bruising.     Objective   /75   Pulse 80   Ht 170.2 cm (67\")   Wt 83.9 kg (185 lb)   BMI 28.98 kg/m²     Foot/Ankle Exam:       General:   Appearance: appears stated age and healthy    Orientation: AAOx3    Affect: appropriate    Gait: unimpaired      VASCULAR      Left Foot Vascularity   Normal vascular exam    Dorsalis pedis:  2+  Posterior tibial:  2+  Skin Temperature: warm    Edema Grading:  None  CFT:  < 3 seconds  Pedal Hair Growth:  Present  Varicosities: none        NEUROLOGIC     Left Foot Neurologic   Light touch sensation:  Normal  Hot/cold sensation: normal    Achilles reflex:  2+     MUSCULOSKELETAL      Left Foot Musculoskeletal   Ecchymosis:  None  Tenderness: toe 1    Tenderness comment:  Minimal discomfort  Arch:  Normal     DERMATOLOGIC     Left Foot Dermatologic   Skin: skin intact    Nails: ingrown toenail    Nails comment:  Mildly incurvated medial border of the left great toe.  No signs of infection      Left Foot Additional Comments: No gross concerning features to the left foot.  No deformity or instability.  Minimal discomfort with palpation to the medial border of the great toe.      Assessment/Plan   Sourav was seen today for ingrown toenail.    Diagnoses and all orders for this visit:    Great toe pain, left    Onychocryptosis      Patient presents with intermittent issues involving his left great toe.  After " evaluation, he does have mild incurvation to the medial aspect of the toenail consistent with ingrown.  I did discuss the diagnosis and treatment options.  His symptoms do appear to be quite mild.  He has not had any significant signs of inflammation in the past.  I did review proper routine nail care as well as soaking for symptom management.  We did discuss definitive treatment option of partial nail avulsion with chemical matrixectomy.  He states that he will consider his options.  He has opted to see me on an as-needed basis at this time.    No orders of the defined types were placed in this encounter.       Note is dictated utilizing voice recognition software. Unfortunately this leads to occasional typographical errors. I apologize in advance if the situation occurs. If questions occur please do not hesitate to call our office.

## 2020-09-03 DIAGNOSIS — B35.6 TINEA CRURIS: ICD-10-CM

## 2020-09-03 RX ORDER — CLOTRIMAZOLE 1 %
CREAM (GRAM) TOPICAL
Qty: 45 G | Refills: 3 | Status: SHIPPED | OUTPATIENT
Start: 2020-09-03 | End: 2021-01-21

## 2020-09-15 ENCOUNTER — OFFICE VISIT (OUTPATIENT)
Dept: FAMILY MEDICINE CLINIC | Facility: CLINIC | Age: 35
End: 2020-09-15

## 2020-09-15 ENCOUNTER — HOSPITAL ENCOUNTER (OUTPATIENT)
Dept: GENERAL RADIOLOGY | Facility: HOSPITAL | Age: 35
Discharge: HOME | End: 2020-09-15
Admitting: FAMILY MEDICINE

## 2020-09-15 VITALS
SYSTOLIC BLOOD PRESSURE: 122 MMHG | TEMPERATURE: 98.4 F | RESPIRATION RATE: 18 BRPM | DIASTOLIC BLOOD PRESSURE: 78 MMHG | OXYGEN SATURATION: 98 % | HEIGHT: 67 IN | HEART RATE: 108 BPM | BODY MASS INDEX: 28.66 KG/M2 | WEIGHT: 182.6 LBS

## 2020-09-15 DIAGNOSIS — M79.676 PAIN OF FIFTH TOE: Primary | ICD-10-CM

## 2020-09-15 PROCEDURE — 99213 OFFICE O/P EST LOW 20 MIN: CPT | Performed by: FAMILY MEDICINE

## 2020-09-15 PROCEDURE — 73630 X-RAY EXAM OF FOOT: CPT

## 2020-09-15 NOTE — PROGRESS NOTES
Subjective   Sourav Tamez is a 35 y.o. male.     Chief Complaint   Patient presents with   • Foot Pain       Foot Pain  This is a new problem. The current episode started more than 1 month ago. The problem occurs intermittently. The problem has been gradually worsening. Associated symptoms include headaches. Pertinent negatives include no abdominal pain, arthralgias, chest pain, chills, congestion, coughing, fever, nausea, rash, sore throat, swollen glands or vomiting. Associated symptoms comments: Near little toe, no injury to his knowledge  . The symptoms are aggravated by walking (wearing shoes, or putting pressure on it). He has tried nothing for the symptoms. The treatment provided no relief.        The following portions of the patient's history were reviewed and updated as appropriate: allergies, current medications, past family history, past medical history, past social history, past surgical history and problem list.    Allergies:  Allergies   Allergen Reactions   • Amoxicillin Swelling and Shortness Of Breath   • Cetirizine Swelling   • Diphenhydramine Swelling   • Doxycycline Swelling   • Escitalopram Swelling     Swelling of throat   • Fexofenadine Anaphylaxis   • Loratadine Swelling   • Montelukast Swelling   • Penicillins Shortness Of Breath   • Prednisone Swelling   • Trazodone Swelling   • Codeine Myalgia   • Latex Rash   • Mucinex [Guaifenesin Er] Cough   • Olanzapine Rash   • Robitussin (Alcohol Free) [Guaifenesin] Cough       Social History:  Social History     Socioeconomic History   • Marital status:      Spouse name: Not on file   • Number of children: Not on file   • Years of education: Not on file   • Highest education level: Not on file   Tobacco Use   • Smoking status: Never Smoker   • Smokeless tobacco: Never Used   Substance and Sexual Activity   • Alcohol use: Yes   • Drug use: No   • Sexual activity: Defer       Family History:  Family History   Problem Relation Age of Onset   •  Hypertension Mother    • Hyperlipidemia Mother    • Hyperlipidemia Maternal Grandmother        Past Medical History :  Patient Active Problem List   Diagnosis   • Perennial allergic rhinitis   • ADD (attention deficit disorder)   • Asperger's syndrome   • Mixed hyperlipidemia   • Sliding hiatal hernia   • Anxiety   • Depression, major, recurrent, mild (CMS/HCC)   • Gastroesophageal reflux disease without esophagitis   • Hypothyroidism (acquired)   • Substance abuse (CMS/HCC)   • Mild intermittent asthma without complication   • Labile mood   • Paroxysmal SVT (supraventricular tachycardia) (CMS/HCC)   • Encounter for routine adult physical exam with abnormal findings   • Chronic idiopathic constipation   • Overweight   • Pain of fifth toe       Medication List:    Current Outpatient Medications:   •  ALBUTEROL SULFATE  (90 Base) MCG/ACT inhaler, INHALE 2 PUFFS BY MOUTH EVERY 4 TO 6 HOURS AS NEEDED. MAX 12 PUFFS DAILY., Disp: 18 g, Rfl: 3  •  busPIRone (BUSPAR) 7.5 MG tablet, TAKE 1 TABLET BY MOUTH DAILY FOR 7 DAYS, THEN 1 TABLET TWICE DAILY, Disp: 60 tablet, Rfl: 3  •  clotrimazole (LOTRIMIN) 1 % cream, APPLY TOPICALLY TO THE AFFECTED AREA TWICE DAILY, Disp: 45 g, Rfl: 3  •  fluticasone-salmeterol (ADVAIR DISKUS) 250-50 MCG/DOSE DISKUS, Inhale 1 puff 2 (Two) Times a Day., Disp: 1 each, Rfl: 0  •  ketoconazole (NIZORAL) 2 % cream, Daily., Disp: , Rfl:   •  lamoTRIgine (LaMICtal) 150 MG tablet, Take 150 mg by mouth Daily., Disp: , Rfl: 5  •  metoprolol succinate XL (TOPROL-XL) 25 MG 24 hr tablet, TAKE 1 TABLET BY MOUTH EVERY DAY, Disp: 90 tablet, Rfl: 3  •  omeprazole (priLOSEC) 40 MG capsule, TAKE 1 CAPSULE BY MOUTH DAILY, Disp: 90 capsule, Rfl: 0  •  Polyethylene Glycol 1000 powder, 17 g Daily., Disp: 527 bottle, Rfl: 1  •  Psyllium (METAMUCIL FIBER PO), Take  by mouth., Disp: , Rfl:   •  Triamcinolone Acetonide (Nasacort Allergy 24HR) 55 MCG/ACT nasal inhaler, 2 sprays into the nostril(s) as directed by  "provider Daily., Disp: , Rfl:     Past Surgical History:  Past Surgical History:   Procedure Laterality Date   • TONSILLECTOMY  1988       Review of Systems:  Review of Systems   Constitutional: Negative for activity change, chills and fever.   HENT: Negative for congestion, ear pain, rhinorrhea, sinus pressure, sore throat, swollen glands and voice change.    Eyes: Negative for visual disturbance.   Respiratory: Negative for cough and shortness of breath.    Cardiovascular: Negative for chest pain.   Gastrointestinal: Negative for abdominal pain, diarrhea, nausea and vomiting.   Endocrine: Negative for cold intolerance and heat intolerance.   Genitourinary: Negative for frequency and urgency.   Musculoskeletal: Negative for arthralgias.   Skin: Negative for rash.   Neurological: Negative for syncope.   Hematological: Does not bruise/bleed easily.   Psychiatric/Behavioral: Negative for depressed mood. The patient is not nervous/anxious.        Physical Exam:  Vital Signs:  Visit Vitals  /78   Pulse 108   Temp 98.4 °F (36.9 °C)   Resp 18   Ht 170.2 cm (67\")   Wt 82.8 kg (182 lb 9.6 oz)   SpO2 98%   BMI 28.60 kg/m²       Physical Exam  Vitals signs reviewed.   Constitutional:       Appearance: He is well-developed.   Eyes:      General:         Right eye: No discharge.         Left eye: No discharge.   Cardiovascular:      Rate and Rhythm: Normal rate and regular rhythm.      Heart sounds: Normal heart sounds. No murmur. No friction rub. No gallop.    Pulmonary:      Effort: Pulmonary effort is normal. No respiratory distress.      Breath sounds: Normal breath sounds. No wheezing or rales.   Musculoskeletal:      Comments: Right fifth toe no tenderness no erythema no wound   Skin:     General: Skin is warm and dry.      Findings: No rash.   Neurological:      General: No focal deficit present.      Mental Status: He is alert and oriented to person, place, and time.      Coordination: Coordination normal.      " Gait: Gait normal.   Psychiatric:         Mood and Affect: Mood normal.         Assessment and Plan:  Problem List Items Addressed This Visit        Nervous and Auditory    Pain of fifth toe - Primary    Overview     Right foot  Will get xray  Exam benign         Relevant Orders    XR Foot 3+ View Right          An After Visit Summary and PPPS were given to the patient.             I wore protective equipment throughout this patient encounter to include mask, gloves and eye protection. Hand hygiene was performed before donning protective equipment and after removal when leaving the room.

## 2020-10-01 ENCOUNTER — RESULTS ENCOUNTER (OUTPATIENT)
Dept: FAMILY MEDICINE CLINIC | Facility: CLINIC | Age: 35
End: 2020-10-01

## 2020-10-01 DIAGNOSIS — E03.9 HYPOTHYROIDISM (ACQUIRED): ICD-10-CM

## 2020-10-13 ENCOUNTER — TELEPHONE (OUTPATIENT)
Dept: FAMILY MEDICINE CLINIC | Facility: CLINIC | Age: 35
End: 2020-10-13

## 2020-10-13 NOTE — TELEPHONE ENCOUNTER
----- Message from Judy Bassett MD sent at 9/17/2020  8:57 AM EDT -----  His toe is ok but he has signs of chronic achilles tendon inflammation. Would he want to see a podiatrist?

## 2020-10-15 ENCOUNTER — TELEPHONE (OUTPATIENT)
Dept: FAMILY MEDICINE CLINIC | Facility: CLINIC | Age: 35
End: 2020-10-15

## 2020-10-15 DIAGNOSIS — M79.672 ACUTE FOOT PAIN, LEFT: Primary | ICD-10-CM

## 2020-10-15 NOTE — TELEPHONE ENCOUNTER
Called pt and left message that sent referral for podiatry to see Dr. Gomez and he should here from them and if does not let our office know.

## 2020-10-30 ENCOUNTER — OFFICE VISIT (OUTPATIENT)
Dept: CARDIOLOGY | Facility: CLINIC | Age: 35
End: 2020-10-30

## 2020-10-30 VITALS
HEART RATE: 70 BPM | DIASTOLIC BLOOD PRESSURE: 72 MMHG | HEIGHT: 67 IN | BODY MASS INDEX: 28.09 KG/M2 | SYSTOLIC BLOOD PRESSURE: 103 MMHG | WEIGHT: 179 LBS

## 2020-10-30 DIAGNOSIS — I47.1 PAROXYSMAL SVT (SUPRAVENTRICULAR TACHYCARDIA) (HCC): ICD-10-CM

## 2020-10-30 DIAGNOSIS — E78.2 MIXED HYPERLIPIDEMIA: Primary | ICD-10-CM

## 2020-10-30 DIAGNOSIS — R00.2 PALPITATIONS: ICD-10-CM

## 2020-10-30 DIAGNOSIS — R42 DIZZINESS: ICD-10-CM

## 2020-10-30 PROCEDURE — 93000 ELECTROCARDIOGRAM COMPLETE: CPT | Performed by: INTERNAL MEDICINE

## 2020-10-30 PROCEDURE — 99214 OFFICE O/P EST MOD 30 MIN: CPT | Performed by: INTERNAL MEDICINE

## 2020-10-30 RX ORDER — BUSPIRONE HYDROCHLORIDE 15 MG/1
15 TABLET ORAL 2 TIMES DAILY
COMMUNITY

## 2020-10-30 NOTE — PROGRESS NOTES
Date of Office Visit: 10/30/2020  Encounter Provider: Dr. Alivn Arriaza  Place of Service: [facility] [place]  Patient Name: Sourav Tamez  :1985  Judy Bassett MD    Chief Complaint   Patient presents with   • Rapid Heart Rate     1 year follow up   • Hyperlipidemia     History of Present Illness    I am pleased to see Mr. tamez in my office today  as a follow-up     As you know, patient is 35-year-old white gentleman whose past medical history is  significant for autism, Aspergers syndrome who was referred to me for symptom of palpitation.     Patient reports that he is having symptom of palpitation from last few months.  Patient underwent Holter monitor in your office.  Holter monitor showed narrow complex tachycardia with heart rate greater than 150..  Possibility of SVT cannot be excluded but most likely it seems to be sinus tachycardia.  In May 2018, patient underwent echocardiogram which showed EF of 55-60%.  Trace mitral regurgitation was noted.    Since the previous visit, patient reports that he is having episode of palpitation lasting for 5 to 10 minutes.  It is associated with dizziness.  He gets every other month.  Patient has not passed out.  Patient denies any chest pain or tightness or heaviness.  No orthopnea PND no syncope or presyncope.  No leg edema noted.    EKG showed normal sinus rhythm.  Early repolarization noted.    At this stage, I would recommend to proceed with event monitor.  Patient would need TMT in future.  Patient is advised to come to the hospital if there is episode of palpitation lasting longer..      Past Medical History:   Diagnosis Date   • Acute bronchitis due to other specified organisms     Impression: Increase fluids. Tylenol/motrin for pain or fever. Medication and medication adverse effects discussed. Follow-up 5-7 days for reevaluation if not improved or sooner if needed. Allergic component. Start prednisone.   • Acute non-recurrent maxillary  sinusitis     Impression: mild. Increase fluids. Tylenol/motrin for pain or fever. Medication and medication adverse effects discussed. Follow-up 5-7 days for reevaluation if not improved or sooner if needed.   • Acute non-seasonal allergic rhinitis     Impression: With post nasal drip. Cause of URI symptoms antihistamines discussed Diagnosis, treatment and course discussed. Discussed medication, dosing and adverse effects. Return if there is worsening or persistance of symptoms   • Acute pain of right knee     Impression: discussed nsaids and stretches exam was negative strain most likely   • Advance care planning    • Alcohol screening    • Anxiety and depression     Impression: Good social support, no suicidal or homicidal ideation. Diagnosis and treatment discussed. Discussed counseling. Discussed medication, dosing and adverse effects. Return in 4 weeks   • Asperger syndrome     Impression: stable 9/24/18   • Cough     Impression: Check CXR   • Depression, major, recurrent, mild (CMS/HCC)     Impression: seeing psych   • External hemorrhoid     Impression: none bleeding now and small discussed home care   • Folliculitis     Impression: Diagnosis, treatment and course discussed. Discussed medication, dosing and adverse effects. Return if there is worsening or persistance of symptoms   • Gastroesophageal reflux disease without esophagitis     Impression: he has seen GI. refill medication   • Hiatal hernia    • Hypothyroidism     Impression: recheck today   • Impetigo    • Labile mood     Impression: question if he has some bipolar disorder? Given phone number.   • Medicare annual wellness visit, subsequent     With abnormal findings.    • Mild intermittent asthma with (acute) exacerbation     Impression: mildly exacerbated. no wheeze on exam   • Mixed hyperlipidemia     Impression: will check labs when fasting.   • Overweight (BMI 25.0-29.9)    • Right hip pain     Impression: discussed nsaids and stretches exam  was negative strain most likely   • Screening for depression    • Screening PSA (prostate specific antigen)    • Substance abuse (CMS/HCC)     Story: multiple psych meds         Past Surgical History:   Procedure Laterality Date   • TONSILLECTOMY  1988           Current Outpatient Medications:   •  ALBUTEROL SULFATE  (90 Base) MCG/ACT inhaler, INHALE 2 PUFFS BY MOUTH EVERY 4 TO 6 HOURS AS NEEDED. MAX 12 PUFFS DAILY., Disp: 18 g, Rfl: 3  •  busPIRone (BUSPAR) 15 MG tablet, Take 15 mg by mouth 2 (two) times a day., Disp: , Rfl:   •  clotrimazole (LOTRIMIN) 1 % cream, APPLY TOPICALLY TO THE AFFECTED AREA TWICE DAILY, Disp: 45 g, Rfl: 3  •  fluticasone-salmeterol (ADVAIR DISKUS) 250-50 MCG/DOSE DISKUS, Inhale 1 puff 2 (Two) Times a Day., Disp: 1 each, Rfl: 0  •  ketoconazole (NIZORAL) 2 % cream, Daily., Disp: , Rfl:   •  lamoTRIgine (LaMICtal) 150 MG tablet, Take 150 mg by mouth Daily., Disp: , Rfl: 5  •  metoprolol succinate XL (TOPROL-XL) 25 MG 24 hr tablet, TAKE 1 TABLET BY MOUTH EVERY DAY, Disp: 90 tablet, Rfl: 3  •  omeprazole (priLOSEC) 40 MG capsule, TAKE 1 CAPSULE BY MOUTH DAILY, Disp: 90 capsule, Rfl: 0  •  Polyethylene Glycol 1000 powder, 17 g Daily., Disp: 527 bottle, Rfl: 1  •  Psyllium (METAMUCIL FIBER PO), Take  by mouth., Disp: , Rfl:   •  Triamcinolone Acetonide (Nasacort Allergy 24HR) 55 MCG/ACT nasal inhaler, 2 sprays into the nostril(s) as directed by provider Daily., Disp: , Rfl:       Social History     Socioeconomic History   • Marital status:      Spouse name: Not on file   • Number of children: Not on file   • Years of education: Not on file   • Highest education level: Not on file   Tobacco Use   • Smoking status: Never Smoker   • Smokeless tobacco: Never Used   Substance and Sexual Activity   • Alcohol use: Yes   • Drug use: No   • Sexual activity: Defer         Review of Systems   Constitution: Negative for chills and fever.   HENT: Negative for ear discharge and nosebleeds.   "  Eyes: Negative for discharge and redness.   Cardiovascular: Positive for palpitations. Negative for chest pain, orthopnea, paroxysmal nocturnal dyspnea and syncope.   Respiratory: Negative for cough, shortness of breath and wheezing.    Endocrine: Negative for heat intolerance.   Skin: Negative for rash.   Musculoskeletal: Negative for arthritis and myalgias.   Gastrointestinal: Negative for abdominal pain, melena, nausea and vomiting.   Genitourinary: Negative for dysuria and hematuria.   Neurological: Positive for dizziness. Negative for light-headedness, numbness and tremors.   Psychiatric/Behavioral: Negative for depression. The patient is not nervous/anxious.        Procedures      ECG 12 Lead    Date/Time: 10/30/2020 10:02 AM  Performed by: Alvin Arriaza MD  Authorized by: Alvin Arriaza MD   Comparison: compared with previous ECG   Similar to previous ECG  Rhythm: sinus rhythm    Clinical impression: normal ECG            ECG 12 Lead    (Results Pending)           Objective:    /72   Pulse 70   Ht 170.2 cm (67.01\")   Wt 81.2 kg (179 lb)   BMI 28.03 kg/m²         Constitutional:       Appearance: Well-developed.   Eyes:      General: No scleral icterus.        Right eye: No discharge.   HENT:      Head: Normocephalic and atraumatic.   Neck:      Thyroid: No thyromegaly.      Lymphadenopathy: No cervical adenopathy.   Pulmonary:      Effort: Pulmonary effort is normal. No respiratory distress.      Breath sounds: Normal breath sounds. No wheezing. No rales.   Cardiovascular:      Normal rate. Regular rhythm.      No gallop.   Abdominal:      Tenderness: There is no abdominal tenderness.   Skin:     Findings: No erythema or rash.   Neurological:      Mental Status: Alert and oriented to person, place, and time.             Assessment:       Diagnosis Plan   1. Mixed hyperlipidemia  ECG 12 Lead    Cardiac Event Monitor   2. Paroxysmal SVT (supraventricular tachycardia) (CMS/[facility])  ECG 12 Lead    Cardiac " Event Monitor   3. Dizziness  Cardiac Event Monitor   4. Palpitations  Cardiac Event Monitor            Plan:       At this stage, I would recommend to start event monitor.  Patient may need TMT.  Continue beta-blocker.  If patient has longer episodes of SVT, patient may be a candidate for ablation.

## 2020-11-04 ENCOUNTER — TELEPHONE (OUTPATIENT)
Dept: FAMILY MEDICINE CLINIC | Facility: CLINIC | Age: 35
End: 2020-11-04

## 2020-11-04 DIAGNOSIS — K21.9 GASTROESOPHAGEAL REFLUX DISEASE WITHOUT ESOPHAGITIS: ICD-10-CM

## 2020-11-05 RX ORDER — OMEPRAZOLE 40 MG/1
40 CAPSULE, DELAYED RELEASE ORAL DAILY
Qty: 90 CAPSULE | Refills: 0 | Status: SHIPPED | OUTPATIENT
Start: 2020-11-05 | End: 2021-02-08

## 2020-11-06 ENCOUNTER — HOSPITAL ENCOUNTER (OUTPATIENT)
Dept: RESPIRATORY THERAPY | Facility: HOSPITAL | Age: 35
Discharge: HOME | End: 2020-11-06
Admitting: INTERNAL MEDICINE

## 2020-11-06 DIAGNOSIS — E78.2 MIXED HYPERLIPIDEMIA: ICD-10-CM

## 2020-11-06 DIAGNOSIS — R42 DIZZINESS: ICD-10-CM

## 2020-11-06 DIAGNOSIS — I47.1 PAROXYSMAL SVT (SUPRAVENTRICULAR TACHYCARDIA) (HCC): ICD-10-CM

## 2020-11-06 DIAGNOSIS — R00.2 PALPITATIONS: ICD-10-CM

## 2020-11-06 PROCEDURE — 93270 REMOTE 30 DAY ECG REV/REPORT: CPT

## 2020-11-18 ENCOUNTER — OFFICE VISIT (OUTPATIENT)
Dept: PODIATRY | Facility: CLINIC | Age: 35
End: 2020-11-18

## 2020-11-18 VITALS
BODY MASS INDEX: 28.09 KG/M2 | DIASTOLIC BLOOD PRESSURE: 68 MMHG | HEIGHT: 67 IN | SYSTOLIC BLOOD PRESSURE: 103 MMHG | WEIGHT: 179 LBS | HEART RATE: 86 BPM

## 2020-11-18 DIAGNOSIS — M77.41 METATARSALGIA, RIGHT FOOT: ICD-10-CM

## 2020-11-18 DIAGNOSIS — M79.671 RIGHT FOOT PAIN: Primary | ICD-10-CM

## 2020-11-18 PROCEDURE — 99213 OFFICE O/P EST LOW 20 MIN: CPT | Performed by: PODIATRIST

## 2020-11-19 NOTE — PROGRESS NOTES
11/18/2020  Foot and Ankle Surgery - Established Patient/Follow-up  Provider: Dr. Sanchez Gomez DPM  Location: [facility]    Subjective:  Sourav Tamez is a 35 y.o. male.     Chief Complaint   Patient presents with   • Right Foot - Pain       HPI: Patient returns with new issue involving the right foot.  He complains of pain with increased activity noted to the lateral aspect of the foot.  Symptoms do improve with rest.  He rates his symptoms a 6 out of 10 when noticed.  He is unaware of any injury.  No issues involving his left foot today.    Allergies   Allergen Reactions   • Amoxicillin Swelling and Shortness Of Breath   • Cetirizine Swelling   • Diphenhydramine Swelling   • Doxycycline Swelling   • Escitalopram Swelling     Swelling of throat   • Fexofenadine Anaphylaxis   • Loratadine Swelling   • Montelukast Swelling   • Penicillins Shortness Of Breath   • Prednisone Swelling   • Trazodone Swelling   • Codeine Myalgia   • Latex Rash   • Mucinex [Guaifenesin Er] Cough   • Olanzapine Rash   • Robitussin (Alcohol Free) [Guaifenesin] Cough       Current Outpatient Medications on File Prior to Visit   Medication Sig Dispense Refill   • ALBUTEROL SULFATE  (90 Base) MCG/ACT inhaler INHALE 2 PUFFS BY MOUTH EVERY 4 TO 6 HOURS AS NEEDED. MAX 12 PUFFS DAILY. 18 g 3   • busPIRone (BUSPAR) 15 MG tablet Take 15 mg by mouth 2 (two) times a day.     • clotrimazole (LOTRIMIN) 1 % cream APPLY TOPICALLY TO THE AFFECTED AREA TWICE DAILY 45 g 3   • fluticasone-salmeterol (ADVAIR DISKUS) 250-50 MCG/DOSE DISKUS Inhale 1 puff 2 (Two) Times a Day. 1 each 0   • ketoconazole (NIZORAL) 2 % cream Daily.     • lamoTRIgine (LaMICtal) 150 MG tablet Take 150 mg by mouth Daily.  5   • metoprolol succinate XL (TOPROL-XL) 25 MG 24 hr tablet TAKE 1 TABLET BY MOUTH EVERY DAY 90 tablet 3   • omeprazole (priLOSEC) 40 MG capsule TAKE 1 CAPSULE BY MOUTH DAILY 90 capsule 0   • Polyethylene Glycol 1000 powder 17 g Daily. 527 bottle 1  "  • Psyllium (METAMUCIL FIBER PO) Take  by mouth.     • Triamcinolone Acetonide (Nasacort Allergy 24HR) 55 MCG/ACT nasal inhaler 2 sprays into the nostril(s) as directed by provider Daily.       No current facility-administered medications on file prior to visit.        Objective   /68   Pulse 86   Ht 170.2 cm (67\")   Wt 81.2 kg (179 lb)   BMI 28.04 kg/m²     Foot/Ankle Exam:       General:   Appearance: appears stated age and healthy    Orientation: AAOx3    Affect: appropriate    Gait: unimpaired      VASCULAR      Right Foot Vascularity   Normal vascular exam    Dorsalis pedis:  2+  Posterior tibial:  2+  Skin Temperature: warm    Edema Grading:  None  CFT:  < 3 seconds  Pedal Hair Growth:  Present  Varicosities: none        NEUROLOGIC     Right Foot Neurologic   Light touch sensation:  Normal  Hot/Cold sensation: normal    Achilles reflex:  2+     MUSCULOSKELETAL      Right Foot Musculoskeletal   Ecchymosis:  None  Tenderness: 5th MTPJ    Arch:  Normal     MUSCLE STRENGTH     Right Foot Muscle Strength   Normal strength    Foot dorsiflexion:  5  Foot plantar flexion:  5  Foot inversion:  5  Foot eversion:  5     DERMATOLOGIC     Right Foot Dermatologic   Skin: skin intact       TESTS     Right Foot Tests   Anterior drawer: negative    Varus tilt: negative        Right Foot Additional Comments Mild discomfort involving the lateral, the foot and the fifth metatarsophalangeal joint.  No gross deformity or instability.  No signs of inflammation.      Assessment/Plan   Diagnoses and all orders for this visit:    1. Right foot pain (Primary)    2. Metatarsalgia, right foot      Patient presents with discomfort involving the lateral aspect of the right forefoot.  Imaging was reviewed showing no obvious fracture or dislocation.  I explained that his symptoms are likely secondary to increased forefoot stress and lack of support.  I would like him to obtain a pair of over-the-counter arch supports with " metatarsal pad for forefoot offloading.  We also discussed proper manual therapy and stretching exercises.  I would like to see him in 4 weeks for reevaluation.    No orders of the defined types were placed in this encounter.         Note is dictated utilizing voice recognition software. Unfortunately this leads to occasional typographical errors. I apologize in advance if the situation occurs. If questions occur please do not hesitate to call our office.

## 2020-12-09 RX ORDER — METOPROLOL SUCCINATE 25 MG/1
TABLET, EXTENDED RELEASE ORAL
Qty: 90 TABLET | Refills: 3 | Status: SHIPPED | OUTPATIENT
Start: 2020-12-09 | End: 2021-11-08

## 2020-12-31 ENCOUNTER — OFFICE VISIT (OUTPATIENT)
Dept: PODIATRY | Facility: CLINIC | Age: 35
End: 2020-12-31

## 2020-12-31 VITALS
DIASTOLIC BLOOD PRESSURE: 69 MMHG | BODY MASS INDEX: 28.19 KG/M2 | HEART RATE: 84 BPM | WEIGHT: 180 LBS | SYSTOLIC BLOOD PRESSURE: 103 MMHG

## 2020-12-31 DIAGNOSIS — M77.41 METATARSALGIA, RIGHT FOOT: Primary | ICD-10-CM

## 2020-12-31 PROCEDURE — 99213 OFFICE O/P EST LOW 20 MIN: CPT | Performed by: PODIATRIST

## 2020-12-31 NOTE — PROGRESS NOTES
12/31/2020  Foot and Ankle Surgery - Established Patient/Follow-up  Provider: Dr. Sanchez Gomez DPM  Location: [facility]    Subjective:  Sourav Tamez is a 35 y.o. male.     Chief Complaint   Patient presents with   • Right Foot - Follow-up       HPI: Patient returns for follow-up on right foot pain.  He did not obtain the inserts and has not been performing stretching exercises but states that his feet are doing okay.  He denies any significant pain or limitation at this time.  No new issues.    Allergies   Allergen Reactions   • Amoxicillin Swelling and Shortness Of Breath   • Cetirizine Swelling   • Diphenhydramine Swelling   • Doxycycline Swelling   • Escitalopram Swelling     Swelling of throat   • Fexofenadine Anaphylaxis   • Loratadine Swelling   • Montelukast Swelling   • Penicillins Shortness Of Breath   • Prednisone Swelling   • Trazodone Swelling   • Codeine Myalgia   • Latex Rash   • Mucinex [Guaifenesin Er] Cough   • Olanzapine Rash   • Robitussin (Alcohol Free) [Guaifenesin] Cough       Current Outpatient Medications on File Prior to Visit   Medication Sig Dispense Refill   • ALBUTEROL SULFATE  (90 Base) MCG/ACT inhaler INHALE 2 PUFFS BY MOUTH EVERY 4 TO 6 HOURS AS NEEDED. MAX 12 PUFFS DAILY. 18 g 3   • busPIRone (BUSPAR) 15 MG tablet Take 15 mg by mouth 2 (two) times a day.     • clotrimazole (LOTRIMIN) 1 % cream APPLY TOPICALLY TO THE AFFECTED AREA TWICE DAILY 45 g 3   • fluticasone-salmeterol (ADVAIR DISKUS) 250-50 MCG/DOSE DISKUS Inhale 1 puff 2 (Two) Times a Day. 1 each 0   • ketoconazole (NIZORAL) 2 % cream Daily.     • lamoTRIgine (LaMICtal) 150 MG tablet Take 150 mg by mouth Daily.  5   • metoprolol succinate XL (TOPROL-XL) 25 MG 24 hr tablet TAKE 1 TABLET BY MOUTH DAILY 90 tablet 3   • omeprazole (priLOSEC) 40 MG capsule TAKE 1 CAPSULE BY MOUTH DAILY 90 capsule 0   • Polyethylene Glycol 1000 powder 17 g Daily. 527 bottle 1   • Psyllium (METAMUCIL FIBER PO) Take  by mouth.      • Triamcinolone Acetonide (Nasacort Allergy 24HR) 55 MCG/ACT nasal inhaler 2 sprays into the nostril(s) as directed by provider Daily.       No current facility-administered medications on file prior to visit.        Objective   /69   Pulse 84   Wt 81.6 kg (180 lb)   BMI 28.19 kg/m²      General:   Appearance: appears stated age and healthy    Orientation: AAOx3    Affect: appropriate    Gait: unimpaired       VASCULAR       Right Foot Vascularity   Normal vascular exam    Dorsalis pedis:  2+  Posterior tibial:  2+  Skin Temperature: warm    Edema Grading:  None  CFT:  < 3 seconds  Pedal Hair Growth:  Present  Varicosities: none        NEUROLOGIC      Right Foot Neurologic   Light touch sensation:  Normal  Hot/Cold sensation: normal    Achilles reflex:  2+      MUSCULOSKELETAL       Right Foot Musculoskeletal   Ecchymosis:  None  Tenderness: Resolved  Arch:  Normal      MUSCLE STRENGTH      Right Foot Muscle Strength   Normal strength    Foot dorsiflexion:  5  Foot plantar flexion:  5  Foot inversion:  5  Foot eversion:  5      DERMATOLOGIC      Right Foot Dermatologic   Skin: skin intact        TESTS      Right Foot Tests   Anterior drawer: negative    Varus tilt: negative        Right Foot Additional Comments: No progressive issues.  No significant pain with palpation    Assessment/Plan   Diagnoses and all orders for this visit:    1. Metatarsalgia, right foot (Primary)      Patient returns and is relatively asymptomatic.  He did not obtain the inserts and has not been performing stretching exercises.  I do feel that these are the most appropriate option is to prevent further issues with his feet.  Patient states that he understands and agrees.  I would like him to monitor and call with any issues or questions.  I will see him on an as-needed basis at this time.    No orders of the defined types were placed in this encounter.         Note is dictated utilizing voice recognition software. Unfortunately  this leads to occasional typographical errors. I apologize in advance if the situation occurs. If questions occur please do not hesitate to call our office.

## 2021-01-13 LAB
Lab: 1
TOAL ENROLLMENT DAYS: 30

## 2021-01-13 PROCEDURE — 93272 ECG/REVIEW INTERPRET ONLY: CPT | Performed by: INTERNAL MEDICINE

## 2021-01-14 ENCOUNTER — TELEPHONE (OUTPATIENT)
Dept: CARDIOLOGY | Facility: CLINIC | Age: 36
End: 2021-01-14

## 2021-01-20 ENCOUNTER — TELEPHONE (OUTPATIENT)
Dept: FAMILY MEDICINE CLINIC | Facility: CLINIC | Age: 36
End: 2021-01-20

## 2021-01-20 DIAGNOSIS — B35.6 TINEA CRURIS: ICD-10-CM

## 2021-01-21 RX ORDER — CLOTRIMAZOLE 1 %
CREAM (GRAM) TOPICAL
Qty: 45 G | Refills: 0 | Status: SHIPPED | OUTPATIENT
Start: 2021-01-21 | End: 2021-04-13

## 2021-01-25 RX ORDER — KETOCONAZOLE 20 MG/G
CREAM TOPICAL DAILY
Status: CANCELLED | OUTPATIENT
Start: 2021-01-25

## 2021-02-06 DIAGNOSIS — K21.9 GASTROESOPHAGEAL REFLUX DISEASE WITHOUT ESOPHAGITIS: ICD-10-CM

## 2021-02-08 RX ORDER — OMEPRAZOLE 40 MG/1
40 CAPSULE, DELAYED RELEASE ORAL DAILY
Qty: 90 CAPSULE | Refills: 0 | Status: SHIPPED | OUTPATIENT
Start: 2021-02-08 | End: 2021-05-24

## 2021-03-16 ENCOUNTER — OFFICE VISIT (OUTPATIENT)
Dept: FAMILY MEDICINE CLINIC | Facility: CLINIC | Age: 36
End: 2021-03-16

## 2021-03-16 VITALS
RESPIRATION RATE: 18 BRPM | DIASTOLIC BLOOD PRESSURE: 66 MMHG | HEART RATE: 119 BPM | BODY MASS INDEX: 29.51 KG/M2 | SYSTOLIC BLOOD PRESSURE: 106 MMHG | TEMPERATURE: 98.6 F | HEIGHT: 67 IN | WEIGHT: 188 LBS | OXYGEN SATURATION: 98 %

## 2021-03-16 DIAGNOSIS — F33.0 DEPRESSION, MAJOR, RECURRENT, MILD (HCC): Primary | ICD-10-CM

## 2021-03-16 PROBLEM — F19.10 SUBSTANCE ABUSE: Status: RESOLVED | Noted: 2020-03-03 | Resolved: 2021-03-16

## 2021-03-16 PROBLEM — K44.9 SLIDING HIATAL HERNIA: Status: ACTIVE | Noted: 2021-03-16

## 2021-03-16 PROBLEM — J30.89 PERENNIAL ALLERGIC RHINITIS: Status: ACTIVE | Noted: 2021-03-16

## 2021-03-16 PROBLEM — I47.10 PAROXYSMAL SVT (SUPRAVENTRICULAR TACHYCARDIA): Status: RESOLVED | Noted: 2020-06-25 | Resolved: 2021-03-16

## 2021-03-16 PROBLEM — E03.9 HYPOTHYROIDISM (ACQUIRED): Chronic | Status: ACTIVE | Noted: 2020-03-03

## 2021-03-16 PROBLEM — I47.1 PAROXYSMAL SVT (SUPRAVENTRICULAR TACHYCARDIA): Status: RESOLVED | Noted: 2020-06-25 | Resolved: 2021-03-16

## 2021-03-16 PROBLEM — E78.2 MIXED HYPERLIPIDEMIA: Chronic | Status: ACTIVE | Noted: 2019-06-20

## 2021-03-16 PROBLEM — F84.5 ASPERGER'S SYNDROME: Chronic | Status: ACTIVE | Noted: 2019-06-20

## 2021-03-16 PROBLEM — F41.9 ANXIETY: Chronic | Status: ACTIVE | Noted: 2020-03-03

## 2021-03-16 PROBLEM — K21.9 GASTROESOPHAGEAL REFLUX DISEASE WITHOUT ESOPHAGITIS: Chronic | Status: ACTIVE | Noted: 2020-03-03

## 2021-03-16 PROBLEM — F98.8 ADD (ATTENTION DEFICIT DISORDER): Chronic | Status: ACTIVE | Noted: 2019-06-20

## 2021-03-16 PROCEDURE — 99214 OFFICE O/P EST MOD 30 MIN: CPT | Performed by: FAMILY MEDICINE

## 2021-03-16 RX ORDER — LAMOTRIGINE 200 MG/1
200 TABLET ORAL DAILY
Qty: 30 TABLET | Refills: 12 | Status: SHIPPED | OUTPATIENT
Start: 2021-03-16 | End: 2022-02-24 | Stop reason: SDUPTHER

## 2021-03-16 NOTE — PROGRESS NOTES
Subjective   Sourav Tamez is a 36 y.o. male.     Chief Complaint   Patient presents with   • Depression       Depression  Visit Type: follow-up  Patient presents with the following symptoms: dry mouth, excessive worry, feelings of hopelessness, feelings of worthlessness, insomnia, irritability, memory impairment and nervousness/anxiety.  Patient is not experiencing: decreased concentration, depressed mood, dizziness, nausea, shortness of breath, suicidal ideas, suicidal planning and thoughts of death.  Frequency of symptoms: occasionally   Severity: causing significant distress   Sleep per night: 4 hours         His wife is ill. She has history of a cerebral hemorrhage.     I personally reviewed and updated the patient's allergies, medications, problem list, and past medical, surgical, social, and family history. I have reviewed and confirmed the accuracy of the History of Present Illness and Review of Symptoms as documented by the MA/LPN/RN. Judy Bassett MD    Allergies:  Allergies   Allergen Reactions   • Amoxicillin Swelling and Shortness Of Breath   • Cetirizine Swelling   • Diphenhydramine Swelling   • Doxycycline Swelling   • Escitalopram Swelling     Swelling of throat   • Fexofenadine Anaphylaxis   • Loratadine Swelling   • Montelukast Swelling   • Penicillins Shortness Of Breath   • Prednisone Swelling   • Trazodone Swelling   • Codeine Myalgia   • Latex Rash   • Mucinex [Guaifenesin Er] Cough   • Olanzapine Rash   • Robitussin (Alcohol Free) [Guaifenesin] Cough       Social History:  Social History     Socioeconomic History   • Marital status:      Spouse name: Not on file   • Number of children: Not on file   • Years of education: Not on file   • Highest education level: Not on file   Tobacco Use   • Smoking status: Never Smoker   • Smokeless tobacco: Never Used   Substance and Sexual Activity   • Alcohol use: Yes   • Drug use: No   • Sexual activity: Defer       Family History:  Family History    Problem Relation Age of Onset   • Hypertension Mother    • Hyperlipidemia Mother    • Hyperlipidemia Maternal Grandmother        Past Medical History :  Patient Active Problem List   Diagnosis   • Perennial allergic rhinitis   • ADD (attention deficit disorder)   • Asperger's syndrome   • Mixed hyperlipidemia   • Sliding hiatal hernia   • Anxiety   • Depression, major, recurrent, mild (CMS/HCC)   • Gastroesophageal reflux disease without esophagitis   • Hypothyroidism (acquired)   • Mild intermittent asthma without complication   • Labile mood   • Encounter for routine adult physical exam with abnormal findings   • Chronic idiopathic constipation   • Overweight   • Pain of fifth toe   • Perennial allergic rhinitis   • Sliding hiatal hernia   • Atypical chest pain       Medication List:    Current Outpatient Medications:   •  ALBUTEROL SULFATE  (90 Base) MCG/ACT inhaler, INHALE 2 PUFFS BY MOUTH EVERY 4 TO 6 HOURS AS NEEDED. MAX 12 PUFFS DAILY., Disp: 18 g, Rfl: 3  •  busPIRone (BUSPAR) 15 MG tablet, Take 15 mg by mouth 2 (two) times a day., Disp: , Rfl:   •  clotrimazole (LOTRIMIN) 1 % cream, APPLY EXTERNALLY TO THE AFFECTED AREA TWICE DAILY, Disp: 45 g, Rfl: 0  •  fluticasone-salmeterol (ADVAIR DISKUS) 250-50 MCG/DOSE DISKUS, Inhale 1 puff 2 (Two) Times a Day., Disp: 1 each, Rfl: 0  •  ketoconazole (NIZORAL) 2 % cream, Daily., Disp: , Rfl:   •  metoprolol succinate XL (TOPROL-XL) 25 MG 24 hr tablet, TAKE 1 TABLET BY MOUTH DAILY, Disp: 90 tablet, Rfl: 3  •  omeprazole (priLOSEC) 40 MG capsule, TAKE 1 CAPSULE BY MOUTH DAILY, Disp: 90 capsule, Rfl: 0  •  Polyethylene Glycol 1000 powder, 17 g Daily., Disp: 527 bottle, Rfl: 1  •  Psyllium (METAMUCIL FIBER PO), Take  by mouth., Disp: , Rfl:   •  Triamcinolone Acetonide (Nasacort Allergy 24HR) 55 MCG/ACT nasal inhaler, 2 sprays into the nostril(s) as directed by provider Daily., Disp: , Rfl:   •  lamoTRIgine (LaMICtal) 200 MG tablet, Take 1 tablet by mouth Daily.,  "Disp: 30 tablet, Rfl: 12    Past Surgical History:  Past Surgical History:   Procedure Laterality Date   • TONSILLECTOMY  1988       Review of Systems:  Review of Systems   Constitutional: Positive for irritability. Negative for activity change and fever.   HENT: Negative for ear pain, rhinorrhea, sinus pressure and voice change.    Eyes: Negative for visual disturbance.   Respiratory: Negative for cough and shortness of breath.    Cardiovascular: Negative for chest pain.   Gastrointestinal: Negative for abdominal pain, diarrhea, nausea and vomiting.   Endocrine: Negative for cold intolerance and heat intolerance.   Genitourinary: Negative for frequency and urgency.   Musculoskeletal: Negative for arthralgias.   Skin: Negative for rash.   Neurological: Negative for syncope.   Hematological: Does not bruise/bleed easily.   Psychiatric/Behavioral: Negative for decreased concentration, suicidal ideas and depressed mood. The patient is nervous/anxious and has insomnia.        Physical Exam:  Vital Signs:  Vital Signs:   /66   Pulse 119   Temp 98.6 °F (37 °C)   Resp 18   Ht 170.2 cm (67\")   Wt 85.3 kg (188 lb)   SpO2 98%   BMI 29.44 kg/m²     Result Review :                Physical Exam  Vitals reviewed.   Constitutional:       Appearance: Normal appearance. He is well-developed.   HENT:      Head: Normocephalic and atraumatic.   Eyes:      General:         Right eye: No discharge.         Left eye: No discharge.   Cardiovascular:      Rate and Rhythm: Normal rate and regular rhythm.      Heart sounds: Normal heart sounds. No murmur heard.   No friction rub. No gallop.    Pulmonary:      Effort: Pulmonary effort is normal. No respiratory distress.      Breath sounds: Normal breath sounds. No wheezing or rales.   Skin:     General: Skin is warm and dry.      Findings: No rash.   Neurological:      Mental Status: He is alert and oriented to person, place, and time.      Coordination: Coordination normal.      " Gait: Gait normal.   Psychiatric:         Mood and Affect: Mood normal.         Behavior: Behavior is cooperative.         Thought Content: Thought content normal.         Assessment and Plan:  Problems Addressed this Visit        Mental Health    Depression, major, recurrent, mild (CMS/HCC) - Primary (Chronic)     Has has seen a psychiatrist before. Worsening with stress over his wife's health. Increase lamictal and follow up in 4 weeks    Good social support, no suicidal or homicidal ideation. Diagnosis and treatment discussed. Discussed counseling. Discussed medication, dosing and adverse effects. Return in 4 weeks           Relevant Medications    lamoTRIgine (LaMICtal) 200 MG tablet      Diagnoses       Codes Comments    Depression, major, recurrent, mild (CMS/HCC)    -  Primary ICD-10-CM: F33.0  ICD-9-CM: 296.31            An After Visit Summary and PPPS were given to the patient.         I wore protective equipment throughout this patient encounter to include mask and gloves. Hand hygiene was performed before donning protective equipment and after removal when leaving the room.

## 2021-03-19 NOTE — ASSESSMENT & PLAN NOTE
Has has seen a psychiatrist before. Worsening with stress over his wife's health. Increase lamictal and follow up in 4 weeks    Good social support, no suicidal or homicidal ideation. Diagnosis and treatment discussed. Discussed counseling. Discussed medication, dosing and adverse effects. Return in 4 weeks

## 2021-04-09 ENCOUNTER — OFFICE VISIT (OUTPATIENT)
Dept: FAMILY MEDICINE CLINIC | Facility: CLINIC | Age: 36
End: 2021-04-09

## 2021-04-09 VITALS
TEMPERATURE: 99.6 F | DIASTOLIC BLOOD PRESSURE: 70 MMHG | OXYGEN SATURATION: 97 % | RESPIRATION RATE: 17 BRPM | SYSTOLIC BLOOD PRESSURE: 118 MMHG | WEIGHT: 190.6 LBS | HEIGHT: 67 IN | BODY MASS INDEX: 29.91 KG/M2 | HEART RATE: 97 BPM

## 2021-04-09 DIAGNOSIS — K59.04 CHRONIC IDIOPATHIC CONSTIPATION: Primary | ICD-10-CM

## 2021-04-09 PROCEDURE — 99213 OFFICE O/P EST LOW 20 MIN: CPT | Performed by: FAMILY MEDICINE

## 2021-04-09 RX ORDER — POLYETHYLENE GLYCOL 1000
17 POWDER (GRAM) MISCELLANEOUS DAILY
Qty: 500 G | Refills: 3 | Status: SHIPPED | OUTPATIENT
Start: 2021-04-09 | End: 2021-08-05

## 2021-04-09 NOTE — ASSESSMENT & PLAN NOTE
From diet change. Increase mirlax to twice a day and stop metamucil. Increase fluids.   Recheck in a week.   Try adding colace if not better

## 2021-04-09 NOTE — PROGRESS NOTES
Subjective   Sourav Tamez is a 36 y.o. male.     Chief Complaint   Patient presents with   • Constipation       Constipation  This is a recurrent problem. The current episode started more than 1 year ago. The problem has been waxing and waning since onset. The stool is described as formed. Associated symptoms include bloating, flatus and vomiting. Pertinent negatives include no abdominal pain, diarrhea, fever or nausea. He has tried fiber (mirilax) for the symptoms.    no blood in stool. No melena    I personally reviewed and updated the patient's allergies, medications, problem list, and past medical, surgical, social, and family history. I have reviewed and confirmed the accuracy of the History of Present Illness and Review of Symptoms as documented by the MA/LPN/RN. Judy Bassett MD    Allergies:  Allergies   Allergen Reactions   • Amoxicillin Swelling and Shortness Of Breath   • Cetirizine Swelling   • Diphenhydramine Swelling   • Doxycycline Swelling   • Escitalopram Swelling     Swelling of throat   • Fexofenadine Anaphylaxis   • Loratadine Swelling   • Montelukast Swelling   • Penicillins Shortness Of Breath   • Prednisone Swelling   • Trazodone Swelling   • Codeine Myalgia   • Latex Rash   • Mucinex [Guaifenesin Er] Cough   • Olanzapine Rash   • Robitussin (Alcohol Free) [Guaifenesin] Cough       Social History:  Social History     Socioeconomic History   • Marital status:      Spouse name: Not on file   • Number of children: Not on file   • Years of education: Not on file   • Highest education level: Not on file   Tobacco Use   • Smoking status: Never Smoker   • Smokeless tobacco: Never Used   Substance and Sexual Activity   • Alcohol use: Yes   • Drug use: No   • Sexual activity: Defer       Family History:  Family History   Problem Relation Age of Onset   • Hypertension Mother    • Hyperlipidemia Mother    • Hyperlipidemia Maternal Grandmother        Past Medical History :  Patient Active  Problem List   Diagnosis   • Perennial allergic rhinitis   • ADD (attention deficit disorder)   • Asperger's syndrome   • Mixed hyperlipidemia   • Sliding hiatal hernia   • Anxiety   • Depression, major, recurrent, mild (CMS/HCC)   • Gastroesophageal reflux disease without esophagitis   • Hypothyroidism (acquired)   • Mild intermittent asthma without complication   • Labile mood   • Encounter for routine adult physical exam with abnormal findings   • Chronic idiopathic constipation   • Overweight   • Pain of fifth toe   • Perennial allergic rhinitis   • Sliding hiatal hernia   • Atypical chest pain       Medication List:    Current Outpatient Medications:   •  ALBUTEROL SULFATE  (90 Base) MCG/ACT inhaler, INHALE 2 PUFFS BY MOUTH EVERY 4 TO 6 HOURS AS NEEDED. MAX 12 PUFFS DAILY., Disp: 18 g, Rfl: 3  •  busPIRone (BUSPAR) 15 MG tablet, Take 15 mg by mouth 2 (two) times a day., Disp: , Rfl:   •  clotrimazole (LOTRIMIN) 1 % cream, APPLY EXTERNALLY TO THE AFFECTED AREA TWICE DAILY, Disp: 45 g, Rfl: 0  •  fluticasone-salmeterol (ADVAIR DISKUS) 250-50 MCG/DOSE DISKUS, Inhale 1 puff 2 (Two) Times a Day., Disp: 1 each, Rfl: 0  •  ketoconazole (NIZORAL) 2 % cream, Daily., Disp: , Rfl:   •  lamoTRIgine (LaMICtal) 200 MG tablet, Take 1 tablet by mouth Daily., Disp: 30 tablet, Rfl: 12  •  metoprolol succinate XL (TOPROL-XL) 25 MG 24 hr tablet, TAKE 1 TABLET BY MOUTH DAILY, Disp: 90 tablet, Rfl: 3  •  omeprazole (priLOSEC) 40 MG capsule, TAKE 1 CAPSULE BY MOUTH DAILY, Disp: 90 capsule, Rfl: 0  •  Polyethylene Glycol 1000 powder, 17 g Daily., Disp: 500 g, Rfl: 3  •  Psyllium (METAMUCIL FIBER PO), Take  by mouth., Disp: , Rfl:   •  Triamcinolone Acetonide (Nasacort Allergy 24HR) 55 MCG/ACT nasal inhaler, 2 sprays into the nostril(s) as directed by provider Daily., Disp: , Rfl:     Past Surgical History:  Past Surgical History:   Procedure Laterality Date   • TONSILLECTOMY  1988       Review of Systems:  Review of Systems  "  Constitutional: Negative for activity change and fever.   HENT: Negative for ear pain, rhinorrhea, sinus pressure and voice change.    Eyes: Negative for visual disturbance.   Respiratory: Negative for cough and shortness of breath.    Cardiovascular: Negative for chest pain.   Gastrointestinal: Positive for bloating, constipation, flatus and vomiting. Negative for abdominal pain, diarrhea and nausea.   Endocrine: Negative for cold intolerance and heat intolerance.   Genitourinary: Negative for frequency and urgency.   Musculoskeletal: Negative for arthralgias.   Skin: Negative for rash.   Neurological: Negative for syncope.   Hematological: Does not bruise/bleed easily.   Psychiatric/Behavioral: Negative for depressed mood. The patient is not nervous/anxious.        Physical Exam:  Vital Signs:  Vital Signs:   /70   Pulse 97   Temp 99.6 °F (37.6 °C)   Resp 17   Ht 170.2 cm (67\")   Wt 86.5 kg (190 lb 9.6 oz)   SpO2 97%   BMI 29.85 kg/m²     Result Review :                Physical Exam  Vitals reviewed.   Constitutional:       Appearance: Normal appearance. He is well-developed.   HENT:      Head: Normocephalic and atraumatic.   Eyes:      General:         Right eye: No discharge.         Left eye: No discharge.   Cardiovascular:      Rate and Rhythm: Normal rate and regular rhythm.      Heart sounds: Normal heart sounds. No murmur heard.   No friction rub. No gallop.    Pulmonary:      Effort: Pulmonary effort is normal. No respiratory distress.      Breath sounds: Normal breath sounds. No wheezing or rales.   Abdominal:      General: Abdomen is flat. Bowel sounds are normal. There is no distension.      Palpations: Abdomen is soft. There is no mass.      Tenderness: There is no abdominal tenderness. There is no guarding or rebound.      Hernia: No hernia is present.   Skin:     General: Skin is warm and dry.      Findings: No rash.   Neurological:      Mental Status: He is alert and oriented to " person, place, and time.      Coordination: Coordination normal.      Gait: Gait normal.   Psychiatric:         Behavior: Behavior is cooperative.         Assessment and Plan:  Problems Addressed this Visit        Gastrointestinal Abdominal     Chronic idiopathic constipation - Primary     From diet change. Increase mirlax to twice a day and stop metamucil. Increase fluids.   Recheck in a week.   Try adding colace if not better         Relevant Medications    Polyethylene Glycol 1000 powder      Diagnoses       Codes Comments    Chronic idiopathic constipation    -  Primary ICD-10-CM: K59.04  ICD-9-CM: 564.00            Follow up in a week    An After Visit Summary and PPPS were given to the patient.         I wore protective equipment throughout this patient encounter to include mask and gloves. Hand hygiene was performed before donning protective equipment and after removal when leaving the room.

## 2021-04-13 ENCOUNTER — OFFICE VISIT (OUTPATIENT)
Dept: FAMILY MEDICINE CLINIC | Facility: CLINIC | Age: 36
End: 2021-04-13

## 2021-04-13 VITALS
BODY MASS INDEX: 30.04 KG/M2 | TEMPERATURE: 98.2 F | SYSTOLIC BLOOD PRESSURE: 118 MMHG | RESPIRATION RATE: 18 BRPM | DIASTOLIC BLOOD PRESSURE: 76 MMHG | WEIGHT: 191.4 LBS | HEIGHT: 67 IN | HEART RATE: 99 BPM | OXYGEN SATURATION: 97 %

## 2021-04-13 DIAGNOSIS — F33.0 DEPRESSION, MAJOR, RECURRENT, MILD (HCC): Primary | Chronic | ICD-10-CM

## 2021-04-13 DIAGNOSIS — K59.04 CHRONIC IDIOPATHIC CONSTIPATION: ICD-10-CM

## 2021-04-13 DIAGNOSIS — R45.86 LABILE MOOD: ICD-10-CM

## 2021-04-13 PROCEDURE — 99212 OFFICE O/P EST SF 10 MIN: CPT | Performed by: FAMILY MEDICINE

## 2021-04-13 RX ORDER — POLYETHYLENE GLYCOL 3350 17 G/17G
POWDER, FOR SOLUTION ORAL
COMMUNITY
Start: 2021-04-09 | End: 2022-08-12 | Stop reason: SDUPTHER

## 2021-04-13 NOTE — ASSESSMENT & PLAN NOTE
He did not increase his miralax yet. He will try to do that for a little bit and then see if that helps

## 2021-04-13 NOTE — PROGRESS NOTES
Subjective   Sourav Tamez is a 36 y.o. male.     Chief Complaint   Patient presents with   • Depression       Depression  Visit Type: follow-up  Patient presents with the following symptoms: irritability and memory impairment.  Patient is not experiencing: decreased concentration, depressed mood, dizziness, dry mouth, excessive worry, feelings of hopelessness, feelings of worthlessness, insomnia, nausea, nervousness/anxiety, shortness of breath, suicidal ideas, suicidal planning and thoughts of death.  Frequency of symptoms: occasionally   Severity: causing significant distress   Sleep per night: 4 hours  Sleep quality: poor  Nighttime awakenings: several  Compliance with medications:  %               I personally reviewed and updated the patient's allergies, medications, problem list, and past medical, surgical, social, and family history. I have reviewed and confirmed the accuracy of the History of Present Illness and Review of Symptoms as documented by the MA/LPN/RN. Judy Bassett MD    Allergies:  Allergies   Allergen Reactions   • Amoxicillin Swelling and Shortness Of Breath   • Cetirizine Swelling   • Diphenhydramine Swelling   • Doxycycline Swelling   • Escitalopram Swelling     Swelling of throat   • Fexofenadine Anaphylaxis   • Loratadine Swelling   • Montelukast Swelling   • Penicillins Shortness Of Breath   • Prednisone Swelling   • Trazodone Swelling   • Codeine Myalgia   • Latex Rash   • Mucinex [Guaifenesin Er] Cough   • Olanzapine Rash   • Robitussin (Alcohol Free) [Guaifenesin] Cough       Social History:  Social History     Socioeconomic History   • Marital status:      Spouse name: Not on file   • Number of children: Not on file   • Years of education: Not on file   • Highest education level: Not on file   Tobacco Use   • Smoking status: Never Smoker   • Smokeless tobacco: Never Used   Substance and Sexual Activity   • Alcohol use: Yes   • Drug use: No   • Sexual activity: Defer        Family History:  Family History   Problem Relation Age of Onset   • Hypertension Mother    • Hyperlipidemia Mother    • Hyperlipidemia Maternal Grandmother        Past Medical History :  Patient Active Problem List   Diagnosis   • Perennial allergic rhinitis   • ADD (attention deficit disorder)   • Asperger's syndrome   • Mixed hyperlipidemia   • Sliding hiatal hernia   • Anxiety   • Depression, major, recurrent, mild (CMS/HCC)   • Gastroesophageal reflux disease without esophagitis   • Hypothyroidism (acquired)   • Mild intermittent asthma without complication   • Labile mood   • Encounter for routine adult physical exam with abnormal findings   • Chronic idiopathic constipation   • Overweight   • Pain of fifth toe   • Perennial allergic rhinitis   • Sliding hiatal hernia   • Atypical chest pain       Medication List:    Current Outpatient Medications:   •  ALBUTEROL SULFATE  (90 Base) MCG/ACT inhaler, INHALE 2 PUFFS BY MOUTH EVERY 4 TO 6 HOURS AS NEEDED. MAX 12 PUFFS DAILY., Disp: 18 g, Rfl: 3  •  busPIRone (BUSPAR) 15 MG tablet, Take 15 mg by mouth 2 (two) times a day., Disp: , Rfl:   •  fluticasone-salmeterol (ADVAIR DISKUS) 250-50 MCG/DOSE DISKUS, Inhale 1 puff 2 (Two) Times a Day., Disp: 1 each, Rfl: 0  •  ketoconazole (NIZORAL) 2 % cream, Daily., Disp: , Rfl:   •  lamoTRIgine (LaMICtal) 200 MG tablet, Take 1 tablet by mouth Daily., Disp: 30 tablet, Rfl: 12  •  metoprolol succinate XL (TOPROL-XL) 25 MG 24 hr tablet, TAKE 1 TABLET BY MOUTH DAILY, Disp: 90 tablet, Rfl: 3  •  omeprazole (priLOSEC) 40 MG capsule, TAKE 1 CAPSULE BY MOUTH DAILY, Disp: 90 capsule, Rfl: 0  •  polyethylene glycol (MIRALAX) 17 GM/SCOOP powder, , Disp: , Rfl:   •  Polyethylene Glycol 1000 powder, 17 g Daily., Disp: 500 g, Rfl: 3  •  Triamcinolone Acetonide (Nasacort Allergy 24HR) 55 MCG/ACT nasal inhaler, 2 sprays into the nostril(s) as directed by provider Daily., Disp: , Rfl:   •  Psyllium (METAMUCIL FIBER PO), Take  by  "mouth., Disp: , Rfl:     Past Surgical History:  Past Surgical History:   Procedure Laterality Date   • TONSILLECTOMY  1988       Review of Systems:  Review of Systems   Constitutional: Positive for irritability. Negative for activity change and fever.   HENT: Negative for ear pain, rhinorrhea, sinus pressure and voice change.    Eyes: Negative for visual disturbance.   Respiratory: Negative for cough and shortness of breath.    Cardiovascular: Negative for chest pain.   Gastrointestinal: Negative for abdominal pain, diarrhea, nausea and vomiting.   Endocrine: Negative for cold intolerance and heat intolerance.   Genitourinary: Negative for frequency and urgency.   Musculoskeletal: Negative for arthralgias.   Skin: Negative for rash.   Neurological: Negative for syncope.   Hematological: Does not bruise/bleed easily.   Psychiatric/Behavioral: Negative for decreased concentration, suicidal ideas and depressed mood. The patient is not nervous/anxious and does not have insomnia.        Physical Exam:  Vital Signs:  Vital Signs:   /76   Pulse 99   Temp 98.2 °F (36.8 °C)   Resp 18   Ht 170.2 cm (67\")   Wt 86.8 kg (191 lb 6.4 oz)   SpO2 97%   BMI 29.98 kg/m²     Result Review :                Physical Exam  Vitals reviewed.   Constitutional:       Appearance: Normal appearance. He is well-developed.   HENT:      Head: Normocephalic and atraumatic.   Eyes:      General:         Right eye: No discharge.         Left eye: No discharge.   Cardiovascular:      Rate and Rhythm: Normal rate and regular rhythm.      Heart sounds: Normal heart sounds. No murmur heard.   No friction rub. No gallop.    Pulmonary:      Effort: Pulmonary effort is normal. No respiratory distress.      Breath sounds: Normal breath sounds. No wheezing or rales.   Skin:     General: Skin is warm and dry.      Findings: No rash.   Neurological:      Mental Status: He is alert and oriented to person, place, and time.      Coordination: " Coordination normal.      Gait: Gait normal.   Psychiatric:         Mood and Affect: Mood normal.         Behavior: Behavior is cooperative.         Thought Content: Thought content normal.         Assessment and Plan:  Problems Addressed this Visit        Gastrointestinal Abdominal     Chronic idiopathic constipation     He did not increase his miralax yet. He will try to do that for a little bit and then see if that helps            Mental Health    Depression, major, recurrent, mild (CMS/HCC) - Primary (Chronic)     Increase in lamictal helped. He is feeling better.          Labile mood      Diagnoses       Codes Comments    Depression, major, recurrent, mild (CMS/HCC)    -  Primary ICD-10-CM: F33.0  ICD-9-CM: 296.31     Chronic idiopathic constipation     ICD-10-CM: K59.04  ICD-9-CM: 564.00     Labile mood     ICD-10-CM: R45.86  ICD-9-CM: 799.24            An After Visit Summary and PPPS were given to the patient.         I wore protective equipment throughout this patient encounter to include mask.. Hand hygiene was performed before donning protective equipment and after removal when leaving the room.

## 2021-04-29 ENCOUNTER — OFFICE VISIT (OUTPATIENT)
Dept: PODIATRY | Facility: CLINIC | Age: 36
End: 2021-04-29

## 2021-04-29 VITALS
HEART RATE: 86 BPM | WEIGHT: 191 LBS | BODY MASS INDEX: 29.98 KG/M2 | DIASTOLIC BLOOD PRESSURE: 71 MMHG | HEIGHT: 67 IN | SYSTOLIC BLOOD PRESSURE: 102 MMHG

## 2021-04-29 DIAGNOSIS — M77.41 METATARSALGIA, RIGHT FOOT: ICD-10-CM

## 2021-04-29 DIAGNOSIS — M79.671 CHRONIC FOOT PAIN, RIGHT: Primary | ICD-10-CM

## 2021-04-29 DIAGNOSIS — G89.29 CHRONIC FOOT PAIN, RIGHT: Primary | ICD-10-CM

## 2021-04-29 DIAGNOSIS — M24.571 EQUINUS CONTRACTURE OF RIGHT ANKLE: ICD-10-CM

## 2021-04-29 PROCEDURE — 99213 OFFICE O/P EST LOW 20 MIN: CPT | Performed by: PODIATRIST

## 2021-04-29 RX ORDER — MELOXICAM 15 MG/1
15 TABLET ORAL DAILY
Qty: 30 TABLET | Refills: 0 | Status: SHIPPED | OUTPATIENT
Start: 2021-04-29 | End: 2021-11-05

## 2021-04-29 NOTE — PROGRESS NOTES
04/29/2021  Foot and Ankle Surgery - Established Patient/Follow-up  Provider: Dr. Sanchez Gomez DPM  Location: [facility]    Subjective:  Sourav Tamez is a 36 y.o. male.     Chief Complaint   Patient presents with   • Right Foot - Pain, Follow-up       HPI: Patient returns with continued right foot pain.  He has noticed recent exacerbation with increased activity.  He complains of pain involving the plantar lateral aspect of the right foot.  Symptoms are worse with barefoot weightbearing.  He did not obtain the inserts that were previously discussed and states that he has not been performing stretching exercises routinely.  Issues are worse with long periods of standing or walking.  He denies any recent injuries or other extenuating circumstances.    Allergies   Allergen Reactions   • Amoxicillin Swelling and Shortness Of Breath   • Cetirizine Swelling   • Diphenhydramine Swelling   • Doxycycline Swelling   • Escitalopram Swelling     Swelling of throat   • Fexofenadine Anaphylaxis   • Loratadine Swelling   • Montelukast Swelling   • Penicillins Shortness Of Breath   • Prednisone Swelling   • Trazodone Swelling   • Codeine Myalgia   • Latex Rash   • Mucinex [Guaifenesin Er] Cough   • Olanzapine Rash   • Robitussin (Alcohol Free) [Guaifenesin] Cough       Current Outpatient Medications on File Prior to Visit   Medication Sig Dispense Refill   • ALBUTEROL SULFATE  (90 Base) MCG/ACT inhaler INHALE 2 PUFFS BY MOUTH EVERY 4 TO 6 HOURS AS NEEDED. MAX 12 PUFFS DAILY. 18 g 3   • busPIRone (BUSPAR) 15 MG tablet Take 15 mg by mouth 2 (two) times a day.     • fluticasone-salmeterol (ADVAIR DISKUS) 250-50 MCG/DOSE DISKUS Inhale 1 puff 2 (Two) Times a Day. 1 each 0   • ketoconazole (NIZORAL) 2 % cream Daily.     • lamoTRIgine (LaMICtal) 200 MG tablet Take 1 tablet by mouth Daily. 30 tablet 12   • metoprolol succinate XL (TOPROL-XL) 25 MG 24 hr tablet TAKE 1 TABLET BY MOUTH DAILY 90 tablet 3   • omeprazole  "(priLOSEC) 40 MG capsule TAKE 1 CAPSULE BY MOUTH DAILY 90 capsule 0   • polyethylene glycol (MIRALAX) 17 GM/SCOOP powder      • Polyethylene Glycol 1000 powder 17 g Daily. 500 g 3   • Psyllium (METAMUCIL FIBER PO) Take  by mouth.     • Triamcinolone Acetonide (Nasacort Allergy 24HR) 55 MCG/ACT nasal inhaler 2 sprays into the nostril(s) as directed by provider Daily.       No current facility-administered medications on file prior to visit.       Objective   /71   Pulse 86   Ht 170.2 cm (67\")   Wt 86.6 kg (191 lb)   BMI 29.91 kg/m²      General:   Appearance: appears stated age and healthy    Orientation: AAOx3    Affect: appropriate    Gait: unimpaired       VASCULAR       Right Foot Vascularity   Normal vascular exam    Dorsalis pedis:  2+  Posterior tibial:  2+  Skin Temperature: warm    Edema Grading:  None  CFT:  < 3 seconds  Pedal Hair Growth:  Present  Varicosities: none        NEUROLOGIC      Right Foot Neurologic   Light touch sensation:  Normal  Hot/Cold sensation: normal    Achilles reflex:  2+      MUSCULOSKELETAL       Right Foot Musculoskeletal   Ecchymosis:  None  Tenderness: Resolved  Arch:  Normal      MUSCLE STRENGTH      Right Foot Muscle Strength   Normal strength    Foot dorsiflexion:  5  Foot plantar flexion:  5  Foot inversion:  5  Foot eversion:  5      DERMATOLOGIC      Right Foot Dermatologic   Skin: skin intact        TESTS      Right Foot Tests   Anterior drawer: negative    Varus tilt: negative     Assessment/Plan   Diagnoses and all orders for this visit:    1. Chronic foot pain, right (Primary)    2. Metatarsalgia, right foot  -     XR Foot 3+ View Right  -     meloxicam (MOBIC) 15 MG tablet; Take 1 tablet by mouth Daily. Take once daily.  Dispense: 30 tablet; Refill: 0    3. Equinus contracture of right ankle      Patient's physical exam is unchanged and stable.  He continues to complain of pain involving the plantar lateral aspect of the right forefoot with increased " activity.  His previous imaging was reviewed showing no obvious deformity.  I continued to explained that his symptoms are secondary to lack of forefoot support and increased stress due to calf tightness.  I do feel that his best chance of improvement is to provide increased support with the use of the over-the-counter arch supports.  We did discuss the power step inserts with metatarsal pad for forefoot offloading.  I have also asked that he start stretching and manual therapy exercises routinely.  I have prescribed him a course of Mobic to help with his discomfort during the day.  I would like to see him in 4 weeks for reevaluation    Orders Placed This Encounter   Procedures   • XR Foot 3+ View Right     Order Specific Question:   Reason for Exam:     Answer:   RM 10 Right foot FU increased pain      Order Specific Question:   Does this patient have a diabetic monitoring/medication delivering device on?     Answer:   No     Order Specific Question:   Release to patient     Answer:   Immediate          Note is dictated utilizing voice recognition software. Unfortunately this leads to occasional typographical errors. I apologize in advance if the situation occurs. If questions occur please do not hesitate to call our office.

## 2021-05-06 NOTE — PROGRESS NOTES
Date of Office Visit: 2021  Encounter Provider: Dr. Alvin Arriaza  Place of Service: [facility] CARDIOLOGY [place]  Patient Name: Sourav Tamez  :1985  Judy Bassett MD    Chief Complaint   Patient presents with   • Chest Pain     6 month follow up   • Hyperlipidemia     History of Present Illness    I am pleased to see Mr. tamez in my office today  as a follow-up     As you know, patient is 36-year-old white gentleman whose past medical history is  significant for autism, Aspergers syndrome who came today for follow-up     Patient reports that he is having symptom of palpitation from last few months.  Patient underwent Holter monitor in your office.  Holter monitor showed narrow complex tachycardia with heart rate greater than 150..  Possibility of SVT cannot be excluded but most likely it seems to be sinus tachycardia.  In May 2018, patient underwent echocardiogram which showed EF of 55-60%.  Trace mitral regurgitation was noted.  In 2020, patient underwent event monitor and there was no significant arrhythmia burden was noted.    Since the previous visit, patient is doing well.  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  Patient reports that he gets occasional palpitation if he uses any soda.  Patient denies any orthopnea, PND, syncope or presyncope.  No dizziness or lightheadedness.    EKG showed sinus rhythm.    His symptoms has been contained with low-dose beta-blocker therapy.  I would recommend to continue current treatment.  I will see the patient as needed patient can follow-up with PCP        Past Medical History:   Diagnosis Date   • Acute bronchitis due to other specified organisms     Impression: Increase fluids. Tylenol/motrin for pain or fever. Medication and medication adverse effects discussed. Follow-up 5-7 days for reevaluation if not improved or sooner if needed. Allergic component. Start prednisone.   • Acute non-recurrent maxillary sinusitis     Impression: mild.  Increase fluids. Tylenol/motrin for pain or fever. Medication and medication adverse effects discussed. Follow-up 5-7 days for reevaluation if not improved or sooner if needed.   • Acute non-seasonal allergic rhinitis     Impression: With post nasal drip. Cause of URI symptoms antihistamines discussed Diagnosis, treatment and course discussed. Discussed medication, dosing and adverse effects. Return if there is worsening or persistance of symptoms   • Acute pain of right knee     Impression: discussed nsaids and stretches exam was negative strain most likely   • Advance care planning    • Alcohol screening    • Anxiety and depression     Impression: Good social support, no suicidal or homicidal ideation. Diagnosis and treatment discussed. Discussed counseling. Discussed medication, dosing and adverse effects. Return in 4 weeks   • Asperger syndrome     Impression: stable 9/24/18   • Cough     Impression: Check CXR   • Depression, major, recurrent, mild (CMS/HCC)     Impression: seeing psych   • External hemorrhoid     Impression: none bleeding now and small discussed home care   • Folliculitis     Impression: Diagnosis, treatment and course discussed. Discussed medication, dosing and adverse effects. Return if there is worsening or persistance of symptoms   • Gastroesophageal reflux disease without esophagitis     Impression: he has seen GI. refill medication   • Hiatal hernia    • Hypothyroidism     Impression: recheck today   • Impetigo    • Labile mood     Impression: question if he has some bipolar disorder? Given phone number.   • Medicare annual wellness visit, subsequent     With abnormal findings.    • Mild intermittent asthma with (acute) exacerbation     Impression: mildly exacerbated. no wheeze on exam   • Mixed hyperlipidemia     Impression: will check labs when fasting.   • Overweight (BMI 25.0-29.9)    • Right hip pain     Impression: discussed nsaids and stretches exam was negative strain most likely    • Screening for depression    • Screening PSA (prostate specific antigen)    • Substance abuse (CMS/[facility])     Story: multiple psych meds         Past Surgical History:   Procedure Laterality Date   • TONSILLECTOMY  1988           Current Outpatient Medications:   •  ALBUTEROL SULFATE  (90 Base) MCG/ACT inhaler, INHALE 2 PUFFS BY MOUTH EVERY 4 TO 6 HOURS AS NEEDED. MAX 12 PUFFS DAILY., Disp: 18 g, Rfl: 3  •  busPIRone (BUSPAR) 15 MG tablet, Take 15 mg by mouth 2 (two) times a day., Disp: , Rfl:   •  fluticasone-salmeterol (ADVAIR DISKUS) 250-50 MCG/DOSE DISKUS, Inhale 1 puff 2 (Two) Times a Day., Disp: 1 each, Rfl: 0  •  ketoconazole (NIZORAL) 2 % cream, Daily., Disp: , Rfl:   •  lamoTRIgine (LaMICtal) 200 MG tablet, Take 1 tablet by mouth Daily., Disp: 30 tablet, Rfl: 12  •  meloxicam (MOBIC) 15 MG tablet, Take 1 tablet by mouth Daily. Take once daily., Disp: 30 tablet, Rfl: 0  •  metoprolol succinate XL (TOPROL-XL) 25 MG 24 hr tablet, TAKE 1 TABLET BY MOUTH DAILY, Disp: 90 tablet, Rfl: 3  •  omeprazole (priLOSEC) 40 MG capsule, TAKE 1 CAPSULE BY MOUTH DAILY, Disp: 90 capsule, Rfl: 0  •  polyethylene glycol (MIRALAX) 17 GM/SCOOP powder, , Disp: , Rfl:   •  Polyethylene Glycol 1000 powder, 17 g Daily., Disp: 500 g, Rfl: 3  •  Triamcinolone Acetonide (Nasacort Allergy 24HR) 55 MCG/ACT nasal inhaler, 2 sprays into the nostril(s) as directed by provider Daily., Disp: , Rfl:       Social History     Socioeconomic History   • Marital status:      Spouse name: Not on file   • Number of children: Not on file   • Years of education: Not on file   • Highest education level: Not on file   Tobacco Use   • Smoking status: Never Smoker   • Smokeless tobacco: Never Used   Vaping Use   • Vaping Use: Never used   Substance and Sexual Activity   • Alcohol use: Yes   • Drug use: No   • Sexual activity: Defer         Review of Systems   Constitutional: Negative for chills and fever.   HENT: Negative for ear discharge and  "nosebleeds.    Eyes: Negative for discharge and redness.   Cardiovascular: Negative for chest pain, orthopnea, palpitations, paroxysmal nocturnal dyspnea and syncope.   Respiratory: Negative for cough, shortness of breath and wheezing.    Endocrine: Negative for heat intolerance.   Skin: Negative for rash.   Musculoskeletal: Negative for arthritis and myalgias.   Gastrointestinal: Negative for abdominal pain, melena, nausea and vomiting.   Genitourinary: Negative for dysuria and hematuria.   Neurological: Negative for dizziness, light-headedness, numbness and tremors.   Psychiatric/Behavioral: Negative for depression. The patient is not nervous/anxious.        Procedures      ECG 12 Lead    Date/Time: 5/7/2021 1:47 PM  Performed by: Alvin Arriaza MD  Authorized by: Alvin Arriaza MD   Comparison: compared with previous ECG   Similar to previous ECG  Rhythm: sinus rhythm    Clinical impression: normal ECG            ECG 12 Lead    (Results Pending)           Objective:    /78   Pulse 79   Ht 170.2 cm (67.01\")   Wt 86.6 kg (191 lb)   BMI 29.91 kg/m²         Constitutional:       Appearance: Well-developed.   Eyes:      General: No scleral icterus.        Right eye: No discharge.   HENT:      Head: Normocephalic and atraumatic.   Neck:      Thyroid: No thyromegaly.      Lymphadenopathy: No cervical adenopathy.   Pulmonary:      Effort: Pulmonary effort is normal. No respiratory distress.      Breath sounds: Normal breath sounds. No wheezing. No rales.   Cardiovascular:      Normal rate. Regular rhythm.      No gallop.   Abdominal:      Tenderness: There is no abdominal tenderness.   Skin:     Findings: No erythema or rash.   Neurological:      Mental Status: Alert and oriented to person, place, and time.             Assessment:       Diagnosis Plan   1. Atypical chest pain  ECG 12 Lead   2. Palpitations              Plan:       MDM:    1.  Palpitation:    Since addition of Toprol-XL, his symptom of " palpitations are contained.  I will continue current treatment.  I will see the patient as needed.  Next    2.  Chest pain:    Patient is not reporting any symptom of chest pain

## 2021-05-07 ENCOUNTER — OFFICE VISIT (OUTPATIENT)
Dept: CARDIOLOGY | Facility: CLINIC | Age: 36
End: 2021-05-07

## 2021-05-07 VITALS
DIASTOLIC BLOOD PRESSURE: 78 MMHG | BODY MASS INDEX: 29.98 KG/M2 | SYSTOLIC BLOOD PRESSURE: 112 MMHG | HEIGHT: 67 IN | WEIGHT: 191 LBS | HEART RATE: 79 BPM

## 2021-05-07 DIAGNOSIS — R07.89 ATYPICAL CHEST PAIN: Primary | ICD-10-CM

## 2021-05-07 DIAGNOSIS — R00.2 PALPITATIONS: ICD-10-CM

## 2021-05-07 PROCEDURE — 93000 ELECTROCARDIOGRAM COMPLETE: CPT | Performed by: INTERNAL MEDICINE

## 2021-05-07 PROCEDURE — 99212 OFFICE O/P EST SF 10 MIN: CPT | Performed by: INTERNAL MEDICINE

## 2021-05-22 DIAGNOSIS — K21.9 GASTROESOPHAGEAL REFLUX DISEASE WITHOUT ESOPHAGITIS: ICD-10-CM

## 2021-05-22 RX ORDER — OMEPRAZOLE 40 MG/1
40 CAPSULE, DELAYED RELEASE ORAL DAILY
Qty: 90 CAPSULE | Refills: 0 | Status: CANCELLED | OUTPATIENT
Start: 2021-05-22

## 2021-05-24 RX ORDER — OMEPRAZOLE 40 MG/1
40 CAPSULE, DELAYED RELEASE ORAL DAILY
Qty: 90 CAPSULE | Refills: 0 | Status: SHIPPED | OUTPATIENT
Start: 2021-05-24

## 2021-08-05 ENCOUNTER — OFFICE VISIT (OUTPATIENT)
Dept: FAMILY MEDICINE CLINIC | Facility: CLINIC | Age: 36
End: 2021-08-05

## 2021-08-05 VITALS
WEIGHT: 189.2 LBS | SYSTOLIC BLOOD PRESSURE: 124 MMHG | BODY MASS INDEX: 29.7 KG/M2 | HEART RATE: 96 BPM | OXYGEN SATURATION: 98 % | HEIGHT: 67 IN | DIASTOLIC BLOOD PRESSURE: 76 MMHG | RESPIRATION RATE: 18 BRPM | TEMPERATURE: 98.1 F

## 2021-08-05 DIAGNOSIS — M77.11 LATERAL EPICONDYLITIS OF RIGHT ELBOW: Primary | ICD-10-CM

## 2021-08-05 PROBLEM — J30.89 PERENNIAL ALLERGIC RHINITIS: Status: RESOLVED | Noted: 2021-03-16 | Resolved: 2021-08-05

## 2021-08-05 PROCEDURE — 99213 OFFICE O/P EST LOW 20 MIN: CPT | Performed by: FAMILY MEDICINE

## 2021-08-05 RX ORDER — IBUPROFEN 800 MG/1
800 TABLET ORAL EVERY 8 HOURS PRN
Qty: 90 TABLET | Refills: 0 | Status: SHIPPED | OUTPATIENT
Start: 2021-08-05 | End: 2023-01-19 | Stop reason: SDUPTHER

## 2021-08-05 RX ORDER — MULTIPLE VITAMINS W/ MINERALS TAB 9MG-400MCG
1 TAB ORAL DAILY
COMMUNITY

## 2021-08-05 NOTE — PROGRESS NOTES
Subjective   Sourav Tamez is a 36 y.o. male.     Chief Complaint   Patient presents with   • Fall       Patient fell Saturday he didn't have pain until Tuesday and it started in his elbow and wrist both right arm.     Fall  The accident occurred 3 to 5 days ago. The fall occurred while standing. He fell from a height of 3 to 5 ft. Impact surface: side of bathroom tub. There was no blood loss. The point of impact was the left knee, right wrist and right elbow. The pain is present in the right elbow and right wrist. The symptoms are aggravated by flexion and movement. Pertinent negatives include no abdominal pain, bowel incontinence, fever, headaches, hearing loss, nausea, numbness, tingling, visual change or vomiting.    Hit arm (wrist) on edge of tub. The wrist and knee do not hurt. Only the right elbow is tender.     I personally reviewed and updated the patient's allergies, medications, problem list, and past medical, surgical, social, and family history. I have reviewed and confirmed the accuracy of the History of Present Illness and Review of Symptoms as documented by the MA/LPN/RN. Judy Bassett MD    Allergies:  Allergies   Allergen Reactions   • Amoxicillin Swelling and Shortness Of Breath   • Cetirizine Swelling   • Diphenhydramine Swelling   • Doxycycline Swelling   • Escitalopram Swelling     Swelling of throat   • Fexofenadine Anaphylaxis   • Loratadine Swelling   • Montelukast Swelling   • Penicillins Shortness Of Breath   • Prednisone Swelling   • Trazodone Swelling   • Codeine Myalgia   • Latex Rash   • Mucinex [Guaifenesin Er] Cough   • Olanzapine Rash   • Robitussin (Alcohol Free) [Guaifenesin] Cough       Social History:  Social History     Socioeconomic History   • Marital status:      Spouse name: Not on file   • Number of children: Not on file   • Years of education: Not on file   • Highest education level: Not on file   Tobacco Use   • Smoking status: Never Smoker   • Smokeless  tobacco: Never Used   Vaping Use   • Vaping Use: Never used   Substance and Sexual Activity   • Alcohol use: Yes   • Drug use: No   • Sexual activity: Defer       Family History:  Family History   Problem Relation Age of Onset   • Hypertension Mother    • Hyperlipidemia Mother    • Hyperlipidemia Maternal Grandmother        Past Medical History :  Patient Active Problem List   Diagnosis   • Perennial allergic rhinitis   • ADD (attention deficit disorder)   • Asperger's syndrome   • Mixed hyperlipidemia   • Sliding hiatal hernia   • Anxiety   • Depression, major, recurrent, mild (CMS/HCC)   • Gastroesophageal reflux disease without esophagitis   • Hypothyroidism (acquired)   • Mild intermittent asthma without complication   • Labile mood   • Encounter for routine adult physical exam with abnormal findings   • Chronic idiopathic constipation   • Overweight   • Pain of fifth toe   • Sliding hiatal hernia   • Atypical chest pain   • Lateral epicondylitis of right elbow       Medication List:    Current Outpatient Medications:   •  ALBUTEROL SULFATE  (90 Base) MCG/ACT inhaler, INHALE 2 PUFFS BY MOUTH EVERY 4 TO 6 HOURS AS NEEDED. MAX 12 PUFFS DAILY., Disp: 18 g, Rfl: 3  •  busPIRone (BUSPAR) 15 MG tablet, Take 15 mg by mouth 2 (two) times a day., Disp: , Rfl:   •  fluticasone-salmeterol (ADVAIR DISKUS) 250-50 MCG/DOSE DISKUS, Inhale 1 puff 2 (Two) Times a Day., Disp: 1 each, Rfl: 0  •  lamoTRIgine (LaMICtal) 200 MG tablet, Take 1 tablet by mouth Daily., Disp: 30 tablet, Rfl: 12  •  meloxicam (MOBIC) 15 MG tablet, Take 1 tablet by mouth Daily. Take once daily., Disp: 30 tablet, Rfl: 0  •  metoprolol succinate XL (TOPROL-XL) 25 MG 24 hr tablet, TAKE 1 TABLET BY MOUTH DAILY, Disp: 90 tablet, Rfl: 3  •  multivitamin with minerals tablet tablet, Take 1 tablet by mouth Daily., Disp: , Rfl:   •  omeprazole (priLOSEC) 40 MG capsule, TAKE 1 CAPSULE BY MOUTH DAILY, Disp: 90 capsule, Rfl: 0  •  polyethylene glycol (MIRALAX)  "17 GM/SCOOP powder, , Disp: , Rfl:   •  Triamcinolone Acetonide (Nasacort Allergy 24HR) 55 MCG/ACT nasal inhaler, 2 sprays into the nostril(s) as directed by provider Daily., Disp: , Rfl:   •  ibuprofen (ADVIL,MOTRIN) 800 MG tablet, Take 1 tablet by mouth Every 8 (Eight) Hours As Needed for Mild Pain  or Moderate Pain ., Disp: 90 tablet, Rfl: 0    Past Surgical History:  Past Surgical History:   Procedure Laterality Date   • TONSILLECTOMY  1988       Review of Systems:  Review of Systems   Constitutional: Negative for activity change and fever.   HENT: Negative for ear pain, rhinorrhea, sinus pressure and voice change.    Eyes: Negative for visual disturbance.   Respiratory: Negative for cough and shortness of breath.    Cardiovascular: Negative for chest pain.   Gastrointestinal: Negative for abdominal pain, bowel incontinence, diarrhea, nausea and vomiting.   Endocrine: Negative for cold intolerance and heat intolerance.   Genitourinary: Negative for frequency and urgency.   Musculoskeletal: Negative for arthralgias.   Skin: Negative for rash.   Neurological: Negative for tingling, syncope and numbness.   Hematological: Does not bruise/bleed easily.   Psychiatric/Behavioral: Negative for depressed mood. The patient is not nervous/anxious.        Physical Exam:  Vital Signs:  Vital Signs:   /76   Pulse 96   Temp 98.1 °F (36.7 °C)   Resp 18   Ht 170.2 cm (67.01\")   Wt 85.8 kg (189 lb 3.2 oz)   SpO2 98%   BMI 29.62 kg/m²     Result Review :                Physical Exam  Vitals reviewed.   Constitutional:       Appearance: Normal appearance. He is well-developed.   HENT:      Head: Normocephalic and atraumatic.   Eyes:      General:         Right eye: No discharge.         Left eye: No discharge.   Cardiovascular:      Rate and Rhythm: Normal rate and regular rhythm.      Heart sounds: Normal heart sounds. No murmur heard.   No friction rub. No gallop.    Pulmonary:      Effort: Pulmonary effort is " normal. No respiratory distress.      Breath sounds: Normal breath sounds. No wheezing or rales.   Musculoskeletal:      Right elbow: Tenderness present in lateral epicondyle.   Skin:     General: Skin is warm and dry.      Findings: No rash.   Neurological:      Mental Status: He is alert and oriented to person, place, and time.      Coordination: Coordination normal.      Gait: Gait normal.   Psychiatric:         Behavior: Behavior is cooperative.         Assessment and Plan:  Problems Addressed this Visit        Musculoskeletal and Injuries    Lateral epicondylitis of right elbow - Primary     Diagnosis, treatment and and course discussed. Potential side effects discussed. Return if there is worsening or persistence of symptoms.            Relevant Medications    ibuprofen (ADVIL,MOTRIN) 800 MG tablet      Diagnoses       Codes Comments    Lateral epicondylitis of right elbow    -  Primary ICD-10-CM: M77.11  ICD-9-CM: 726.32            An After Visit Summary and PPPS were given to the patient.         I wore protective equipment throughout this patient encounter to include mask. Hand hygiene was performed before donning protective equipment and after removal when leaving the room.

## 2021-08-16 NOTE — PROGRESS NOTES
Patient advised of results of Panorama low risk fetal sex also given   Subjective   Sourav Tamez is a 36 y.o. male.     Chief Complaint   Patient presents with   • Arm Pain   • Asthma       Arm Pain   The incident occurred more than 1 week ago. The incident occurred at home. The injury mechanism was a fall. The pain is present in the right elbow, right wrist, right forearm and right shoulder. The quality of the pain is described as cramping. The pain radiates to the right arm. The pain has been fluctuating since the incident. Associated symptoms include muscle weakness. Pertinent negatives include no chest pain, numbness or tingling. The symptoms are aggravated by lifting. Treatments tried: ibuprofen 800 mg.   Asthma  He complains of cough. There is no chest tightness, difficulty breathing, hoarse voice, shortness of breath or wheezing. This is a recurrent problem. The problem occurs daily. The problem has been gradually worsening. The cough is non-productive. Associated symptoms include postnasal drip. Pertinent negatives include no chest pain, ear congestion, ear pain, fever, nasal congestion, rhinorrhea, sneezing, sweats or trouble swallowing. His symptoms are aggravated by change in weather. He reports minimal improvement on treatment. Ineffective treatments: trial inhaler from allergist. His past medical history is significant for asthma.        I personally reviewed and updated the patient's allergies, medications, problem list, and past medical, surgical, social, and family history. I have reviewed and confirmed the accuracy of the History of Present Illness and Review of Symptoms as documented by the MA/LPN/RN. Judy Bassett MD    Allergies:  Allergies   Allergen Reactions   • Amoxicillin Swelling and Shortness Of Breath   • Cetirizine Swelling   • Diphenhydramine Swelling   • Doxycycline Swelling   • Escitalopram Swelling     Swelling of throat   • Fexofenadine Anaphylaxis   • Loratadine Swelling   • Montelukast Swelling   • Penicillins Shortness Of Breath   • Prednisone  Swelling   • Trazodone Swelling   • Codeine Myalgia   • Latex Rash   • Mucinex [Guaifenesin Er] Cough   • Olanzapine Rash   • Robitussin (Alcohol Free) [Guaifenesin] Cough       Social History:  Social History     Socioeconomic History   • Marital status:      Spouse name: Not on file   • Number of children: Not on file   • Years of education: Not on file   • Highest education level: Not on file   Tobacco Use   • Smoking status: Never Smoker   • Smokeless tobacco: Never Used   Vaping Use   • Vaping Use: Never used   Substance and Sexual Activity   • Alcohol use: Yes   • Drug use: No   • Sexual activity: Defer       Family History:  Family History   Problem Relation Age of Onset   • Hypertension Mother    • Hyperlipidemia Mother    • Hyperlipidemia Maternal Grandmother        Past Medical History :  Patient Active Problem List   Diagnosis   • Perennial allergic rhinitis   • ADD (attention deficit disorder)   • Asperger's syndrome   • Mixed hyperlipidemia   • Sliding hiatal hernia   • Anxiety   • Depression, major, recurrent, mild (CMS/HCC)   • Gastroesophageal reflux disease without esophagitis   • Hypothyroidism (acquired)   • Mild intermittent asthma without complication   • Labile mood   • Encounter for routine adult physical exam with abnormal findings   • Chronic idiopathic constipation   • Overweight   • Pain of fifth toe   • Sliding hiatal hernia   • Atypical chest pain   • Lateral epicondylitis of right elbow       Medication List:    Current Outpatient Medications:   •  ALBUTEROL SULFATE  (90 Base) MCG/ACT inhaler, INHALE 2 PUFFS BY MOUTH EVERY 4 TO 6 HOURS AS NEEDED. MAX 12 PUFFS DAILY., Disp: 18 g, Rfl: 3  •  busPIRone (BUSPAR) 15 MG tablet, Take 15 mg by mouth 2 (two) times a day., Disp: , Rfl:   •  ibuprofen (ADVIL,MOTRIN) 800 MG tablet, Take 1 tablet by mouth Every 8 (Eight) Hours As Needed for Mild Pain  or Moderate Pain ., Disp: 90 tablet, Rfl: 0  •  lamoTRIgine (LaMICtal) 200 MG tablet,  "Take 1 tablet by mouth Daily., Disp: 30 tablet, Rfl: 12  •  meloxicam (MOBIC) 15 MG tablet, Take 1 tablet by mouth Daily. Take once daily., Disp: 30 tablet, Rfl: 0  •  metoprolol succinate XL (TOPROL-XL) 25 MG 24 hr tablet, TAKE 1 TABLET BY MOUTH DAILY, Disp: 90 tablet, Rfl: 3  •  multivitamin with minerals tablet tablet, Take 1 tablet by mouth Daily., Disp: , Rfl:   •  omeprazole (priLOSEC) 40 MG capsule, TAKE 1 CAPSULE BY MOUTH DAILY, Disp: 90 capsule, Rfl: 0  •  polyethylene glycol (MIRALAX) 17 GM/SCOOP powder, , Disp: , Rfl:   •  Triamcinolone Acetonide (Nasacort Allergy 24HR) 55 MCG/ACT nasal inhaler, 2 sprays into the nostril(s) as directed by provider Daily., Disp: , Rfl:   •  fluticasone (Flovent HFA) 110 MCG/ACT inhaler, Inhale 1 puff 2 (Two) Times a Day., Disp: 12 g, Rfl: 3    Past Surgical History:  Past Surgical History:   Procedure Laterality Date   • TONSILLECTOMY  1988       Review of Systems:  Review of Systems   Constitutional: Negative for activity change and fever.   HENT: Positive for postnasal drip. Negative for ear pain, hoarse voice, rhinorrhea, sinus pressure, sneezing, trouble swallowing and voice change.    Eyes: Negative for visual disturbance.   Respiratory: Positive for cough. Negative for shortness of breath and wheezing.    Cardiovascular: Negative for chest pain.   Gastrointestinal: Negative for abdominal pain, diarrhea, nausea and vomiting.   Endocrine: Negative for cold intolerance and heat intolerance.   Genitourinary: Negative for frequency and urgency.   Musculoskeletal: Negative for arthralgias.   Skin: Negative for rash.   Neurological: Negative for tingling, syncope and numbness.   Hematological: Does not bruise/bleed easily.   Psychiatric/Behavioral: Negative for depressed mood. The patient is not nervous/anxious.        Physical Exam:  Vital Signs:  Vital Signs:   /66   Pulse 111   Temp 98.4 °F (36.9 °C)   Resp 18   Ht 170.2 cm (67.01\")   Wt 85.5 kg (188 lb 6.4 " oz)   SpO2 97%   BMI 29.50 kg/m²     Result Review :                Physical Exam  Vitals reviewed.   Constitutional:       Appearance: Normal appearance. He is well-developed.   HENT:      Head: Normocephalic and atraumatic.   Eyes:      General:         Right eye: No discharge.         Left eye: No discharge.   Cardiovascular:      Rate and Rhythm: Normal rate and regular rhythm.      Heart sounds: Normal heart sounds. No murmur heard.   No friction rub. No gallop.    Pulmonary:      Effort: Pulmonary effort is normal. No respiratory distress.      Breath sounds: Normal breath sounds. No wheezing or rales.   Musculoskeletal:      Right elbow: Tenderness present in lateral epicondyle.   Skin:     General: Skin is warm and dry.      Findings: No rash.   Neurological:      Mental Status: He is alert and oriented to person, place, and time.      Coordination: Coordination normal.      Gait: Gait normal.   Psychiatric:         Behavior: Behavior is cooperative.         Assessment and Plan:  Problems Addressed this Visit        Musculoskeletal and Injuries    Lateral epicondylitis of right elbow - Primary     Diagnosis, treatment and and course discussed. Potential side effects discussed. Return if there is worsening or persistence of symptoms.            Relevant Orders    XR Elbow 2 View Right (Completed)    Ambulatory Referral to Orthopedic Surgery       Pulmonary and Pneumonias    Mild intermittent asthma without complication     Slight exacerbation. Discussed inhalers. Sent in maintenance. Start flonase again as well.            Relevant Medications    fluticasone (Flovent HFA) 110 MCG/ACT inhaler      Diagnoses       Codes Comments    Lateral epicondylitis of right elbow    -  Primary ICD-10-CM: M77.11  ICD-9-CM: 726.32     Mild intermittent asthma without complication     ICD-10-CM: J45.20  ICD-9-CM: 493.90            An After Visit Summary and PPPS were given to the patient.         I wore protective equipment  throughout this patient encounter to include mask. Hand hygiene was performed before donning protective equipment and after removal when leaving the room.

## 2021-08-17 ENCOUNTER — TELEPHONE (OUTPATIENT)
Dept: FAMILY MEDICINE CLINIC | Facility: CLINIC | Age: 36
End: 2021-08-17

## 2021-08-17 ENCOUNTER — OFFICE VISIT (OUTPATIENT)
Dept: FAMILY MEDICINE CLINIC | Facility: CLINIC | Age: 36
End: 2021-08-17

## 2021-08-17 ENCOUNTER — HOSPITAL ENCOUNTER (OUTPATIENT)
Dept: GENERAL RADIOLOGY | Facility: HOSPITAL | Age: 36
Discharge: HOME | End: 2021-08-17
Admitting: FAMILY MEDICINE

## 2021-08-17 VITALS
BODY MASS INDEX: 29.57 KG/M2 | SYSTOLIC BLOOD PRESSURE: 126 MMHG | HEIGHT: 67 IN | DIASTOLIC BLOOD PRESSURE: 66 MMHG | RESPIRATION RATE: 18 BRPM | TEMPERATURE: 98.4 F | OXYGEN SATURATION: 97 % | HEART RATE: 111 BPM | WEIGHT: 188.4 LBS

## 2021-08-17 DIAGNOSIS — M77.11 LATERAL EPICONDYLITIS OF RIGHT ELBOW: Primary | ICD-10-CM

## 2021-08-17 DIAGNOSIS — J45.20 MILD INTERMITTENT ASTHMA WITHOUT COMPLICATION: ICD-10-CM

## 2021-08-17 PROCEDURE — 99214 OFFICE O/P EST MOD 30 MIN: CPT | Performed by: FAMILY MEDICINE

## 2021-08-17 PROCEDURE — 73070 X-RAY EXAM OF ELBOW: CPT

## 2021-08-17 RX ORDER — DEXAMETHASONE 4 MG/1
1 TABLET ORAL
Qty: 12 G | Refills: 3 | Status: SHIPPED | OUTPATIENT
Start: 2021-08-17 | End: 2021-12-28 | Stop reason: SDUPTHER

## 2021-08-17 RX ORDER — BUDESONIDE AND FORMOTEROL FUMARATE DIHYDRATE 80; 4.5 UG/1; UG/1
2 AEROSOL RESPIRATORY (INHALATION)
Qty: 6.9 G | Refills: 12 | Status: SHIPPED | OUTPATIENT
Start: 2021-08-17 | End: 2021-08-17 | Stop reason: CLARIF

## 2021-08-17 NOTE — TELEPHONE ENCOUNTER
Patient came into office, stated that his insurance called, stated that the Rx sent in today is not covered. Patient would like a call.

## 2021-08-17 NOTE — TELEPHONE ENCOUNTER
Caller: [employer]    Relationship: Other    Best call back number: [phone]    What was the call regarding: MENDEZ IS CALLING FROM [employer], THE PATIENT'S INSURANCE, AND WOULD LIKE TO LET DR. APONTE KNOW THAT THEY WOULD COVER THE FOLLOWING MEDICATIONS FOR THE PATIENT:    ADVAIR DISKUS INHALATION POWDER    ADVAIR HFA     BREO ELLIPTA     FLOVENT DISKUS     INCRUSE ELLIPTA    NICOTROL INHALER 10 MG    PULMICORT FLEXHALER

## 2021-08-30 ENCOUNTER — OFFICE VISIT (OUTPATIENT)
Dept: ORTHOPEDIC SURGERY | Facility: CLINIC | Age: 36
End: 2021-08-30

## 2021-08-30 VITALS
HEIGHT: 67 IN | BODY MASS INDEX: 30.29 KG/M2 | DIASTOLIC BLOOD PRESSURE: 72 MMHG | WEIGHT: 193 LBS | SYSTOLIC BLOOD PRESSURE: 108 MMHG | HEART RATE: 84 BPM

## 2021-08-30 DIAGNOSIS — M25.521 RIGHT ELBOW PAIN: Primary | ICD-10-CM

## 2021-08-30 PROCEDURE — 99203 OFFICE O/P NEW LOW 30 MIN: CPT | Performed by: ORTHOPAEDIC SURGERY

## 2021-08-30 RX ORDER — DILTIAZEM HYDROCHLORIDE 60 MG/1
2 TABLET, FILM COATED ORAL 2 TIMES DAILY
COMMUNITY
Start: 2021-08-17 | End: 2021-12-28 | Stop reason: SDUPTHER

## 2021-10-04 ENCOUNTER — OFFICE VISIT (OUTPATIENT)
Dept: ORTHOPEDIC SURGERY | Facility: CLINIC | Age: 36
End: 2021-10-04

## 2021-10-04 VITALS
DIASTOLIC BLOOD PRESSURE: 77 MMHG | BODY MASS INDEX: 30.29 KG/M2 | HEART RATE: 80 BPM | SYSTOLIC BLOOD PRESSURE: 114 MMHG | HEIGHT: 67 IN | WEIGHT: 193 LBS

## 2021-10-04 DIAGNOSIS — M25.521 RIGHT ELBOW PAIN: Primary | ICD-10-CM

## 2021-10-04 PROCEDURE — 99213 OFFICE O/P EST LOW 20 MIN: CPT | Performed by: ORTHOPAEDIC SURGERY

## 2021-10-04 NOTE — PROGRESS NOTES
"     Patient ID: Sourav Tamez is a 36 y.o. male.  Right elbow pain  Mac returns with continued right elbow pain after a fall.  Has diffuse pain and cannot lift objects    Review of Systems:  Right elbow pain      Objective:    /77   Pulse 80   Ht 170.2 cm (67.01\")   Wt 87.5 kg (193 lb)   BMI 30.22 kg/m²     Physical Examination:      Right elbow demonstrates intact skin.  No bruising.  Mild diffuse pain to the common extensor muscle.  Minimal to no pain over the lateral epicondyle itself.  Elbow range of motion 0-1 30 full pronation supination mild pain on resisted wrist extension.  Sensory and motor exam are intact all distributions. Radial pulse is palpable and capillary refill is less than two seconds to all digits.    Imaging:       Assessment:    Right elbow pain possible occult fracture    Plan:   I recommend MRI      Procedures          Disclaimer: Please note that areas of this note were completed with computer voice recognition software.  Quite often unanticipated grammatical, syntax, homophones, and other interpretive errors are inadvertently transcribed by the computer software. Please excuse any errors that have escaped final proofreading.  "

## 2021-10-04 NOTE — PATIENT INSTRUCTIONS
MRI follow-up instructions    Today at your office visit, Dr. Davenport recommended an MRI (magnetic resonance imaging) to evaluate your joint pain.  This requires a precertification process, which our office will do, and then we will contact you when it is approved and go over scheduling options.  We typically recommend these to be performed at [facility] or [facility].  If for some reason it is performed elsewhere please arrange to have that facility give you a disc with your images on it so Dr. Davenport can review it at your follow-up visit.    When checking out today we recommend making an appointment to go over your results in approximately two weeks.  If your MRI is done sooner than that we would be happy to schedule you sooner to go over your results, just contact us at [phone] or through the [employer] portal to let us know your MRI is completed.  Seeing you in person for the results gives us the best opportunity to look at your images together and explain the diagnosis and treatment options to best help you.

## 2021-10-11 NOTE — PROGRESS NOTES
Subsequent Medicare Wellness Visit    Chief Complaint   Patient presents with   • Medicare Wellness-subsequent   • Hyperlipidemia       Subjective   History of Present Illness:  Sourav Tamez is a 36 y.o. male who presents for a Subsequent Medicare Wellness Visit. The patient is here: for coordination of medical care to discuss health maintenance and disease prevention to follow up on multiple medical problems. Overall has: moderate activity with work/home activities, good appetite, feels well with minor complaints, decreased energy level and is sleeping poorly. Nutrition: balanced diet. Last tetanus shot was unknown.    HPI    HEALTH RISK ASSESSMENT    Recent Hospitalizations:  No hospitalization(s) within the last year.    Current Medical Providers:  Patient Care Team:  Judy Bassett MD as PCP - General  Judy Bassett MD as PCP - Family Medicine    Smoking Status:  Social History     Tobacco Use   Smoking Status Never Smoker   Smokeless Tobacco Never Used       Alcohol Consumption:  Social History     Substance and Sexual Activity   Alcohol Use Yes       Depression Screen:   PHQ-2/PHQ-9 Depression Screening 10/12/2021   Little interest or pleasure in doing things 1   Feeling down, depressed, or hopeless 1   Trouble falling or staying asleep, or sleeping too much 3   Feeling tired or having little energy 3   Poor appetite or overeating 0   Feeling bad about yourself - or that you are a failure or have let yourself or your family down 0   Trouble concentrating on things, such as reading the newspaper or watching television 0   Moving or speaking so slowly that other people could have noticed. Or the opposite - being so fidgety or restless that you have been moving around a lot more than usual 0   Thoughts that you would be better off dead, or of hurting yourself in some way 0   Total Score 8   If you checked off any problems, how difficult have these problems made it for you to do your work, take care of  things at home, or get along with other people? -   Little interest or pleasure in doing things -   Feeling down, depressed, or hopeless -   Trouble falling or staying asleep, or sleeping too much -   Feeling tired or having little energy -   Poor appetite or overeating -   Feeling bad about yourself - or that you are a failure or have let yourself or your family down -   Trouble concentrating on things, such as reading the newspaper or watching television -   Moving or speaking so slowly that other people could have noticed. Or the opposite - being so fidgety or restless that you have been moving around a lot more than usual -   Thoughts that you would be better off dead, or of hurting yourself in some way -   How difficult have these problems made it for you to do your work, take care of things at home, or get along with other people? -     I spent more than 16 minutes asking patient questions, counseling and documenting in the chart    Fall Risk Screen:  MARCO Fall Risk Assessment has not been completed.    Health Habits and Functional and Cognitive Screening:  Functional & Cognitive Status 10/12/2021   Do you have difficulty preparing food and eating? No   Do you have difficulty bathing yourself, getting dressed or grooming yourself? No   Do you have difficulty using the toilet? No   Do you have difficulty moving around from place to place? No   Do you have trouble with steps or getting out of a bed or a chair? No   Current Diet Well Balanced Diet   Dental Exam Not up to date   Eye Exam Not up to date   Exercise (times per week) 2 times per week   Current Exercises Include Walking   Current Exercise Activities Include -   Do you need help using the phone?  No   Are you deaf or do you have serious difficulty hearing?  No   Do you need help with transportation? No   Do you need help shopping? No   Do you need help preparing meals?  No   Do you need help with housework?  No   Do you need help with laundry? No   Do  you need help taking your medications? No   Do you need help managing money? No   Do you ever drive or ride in a car without wearing a seat belt? No   Have you felt unusual stress, anger or loneliness in the last month? No   Who do you live with? Spouse   If you need help, do you have trouble finding someone available to you? No   Have you been bothered in the last four weeks by sexual problems? No   Do you have difficulty concentrating, remembering or making decisions? No       Does the patient have evidence of cognitive impairment? No    Asprin use counseling:Does not need ASA (and currently is not on it)    Recent Lab Results:        Age-appropriate Screening Schedule:  Refer to the list below for future screening recommendations based on patient's age, sex and/or medical conditions. Orders for these recommended tests are listed in the plan section. The patient has been provided with a written plan.    Health Maintenance   Topic Date Due   • TDAP/TD VACCINES (1 - Tdap) Never done   • LIPID PANEL  07/21/2021   • INFLUENZA VACCINE  Completed          The following portions of the patient's history were reviewed and updated as appropriate: allergies, current medications, past family history, past medical history, past social history, past surgical history and problem list.      Outpatient Medications Prior to Visit   Medication Sig Dispense Refill   • ALBUTEROL SULFATE  (90 Base) MCG/ACT inhaler INHALE 2 PUFFS BY MOUTH EVERY 4 TO 6 HOURS AS NEEDED. MAX 12 PUFFS DAILY. 18 g 3   • busPIRone (BUSPAR) 15 MG tablet Take 15 mg by mouth 2 (two) times a day.     • fluticasone (Flovent HFA) 110 MCG/ACT inhaler Inhale 1 puff 2 (Two) Times a Day. 12 g 3   • ibuprofen (ADVIL,MOTRIN) 800 MG tablet Take 1 tablet by mouth Every 8 (Eight) Hours As Needed for Mild Pain  or Moderate Pain . 90 tablet 0   • lamoTRIgine (LaMICtal) 200 MG tablet Take 1 tablet by mouth Daily. 30 tablet 12   • meloxicam (MOBIC) 15 MG tablet Take 1  tablet by mouth Daily. Take once daily. 30 tablet 0   • metoprolol succinate XL (TOPROL-XL) 25 MG 24 hr tablet TAKE 1 TABLET BY MOUTH DAILY 90 tablet 3   • multivitamin with minerals tablet tablet Take 1 tablet by mouth Daily.     • omeprazole (priLOSEC) 40 MG capsule TAKE 1 CAPSULE BY MOUTH DAILY 90 capsule 0   • polyethylene glycol (MIRALAX) 17 GM/SCOOP powder      • Symbicort 80-4.5 MCG/ACT inhaler Inhale 2 puffs 2 (Two) Times a Day.     • Triamcinolone Acetonide (Nasacort Allergy 24HR) 55 MCG/ACT nasal inhaler 2 sprays into the nostril(s) as directed by provider Daily.       No facility-administered medications prior to visit.       Patient Active Problem List   Diagnosis   • Perennial allergic rhinitis   • ADD (attention deficit disorder)   • Asperger's syndrome   • Mixed hyperlipidemia   • Sliding hiatal hernia   • Anxiety   • Depression, major, recurrent, mild (HCC)   • Gastroesophageal reflux disease without esophagitis   • Hypothyroidism (acquired)   • Mild intermittent asthma without complication   • Labile mood   • Encounter for routine adult physical exam with abnormal findings   • Chronic idiopathic constipation   • Overweight   • Pain of fifth toe   • Lateral epicondylitis of right elbow       Advanced Care Planning:  ACP discussion was declined by the patient. Patient has an advance directive in EMR which is still valid.     A face-to-face visit was completed today with patient.  Counseling explanation, and discussion of advanced directives was performed.   The last advanced care visit was performed in 2020.  In a near life ending situation, from which he is not expected to recover functionally, and he is not able to speak for him, he does not want life sustaining measures. We discussed feeding tubes, ventilators and cardiac support as well as dialysis.    I spent less than 16 minutes discussing Advanced Care Planning information and documenting in the chart.    Review of Systems   Constitutional:  "Negative for activity change and fever.   HENT: Negative for ear pain, rhinorrhea, sinus pressure and voice change.    Eyes: Negative for visual disturbance.   Respiratory: Negative for cough and shortness of breath.    Cardiovascular: Negative for chest pain.   Gastrointestinal: Negative for abdominal pain, diarrhea, nausea and vomiting.   Endocrine: Negative for cold intolerance and heat intolerance.   Genitourinary: Negative for frequency and urgency.   Musculoskeletal: Negative for arthralgias.   Skin: Negative for rash.   Neurological: Negative for syncope.   Hematological: Does not bruise/bleed easily.   Psychiatric/Behavioral: Negative for depressed mood. The patient is not nervous/anxious.        I have reviewed and confirmed the accuracy of the HPI and ROS as documented by the MA/LPN/RN Judy Bassett MD    Compared to one year ago, the patient feels his physical health is the same.  Compared to one year ago, the patient feels his mental health is the same.    Reviewed chart for potential of high risk medication in the elderly: not applicable  Reviewed chart for potential of harmful drug interactions in the elderly:not applicable    Objective      Vitals:    10/12/21 1030   BP: 92/64   BP Location: Right arm   Patient Position: Sitting   Cuff Size: Adult   Pulse: 86   Resp: 18   Temp: 98.4 °F (36.9 °C)   SpO2: 98%   Weight: 87.9 kg (193 lb 12.8 oz)   Height: 170.2 cm (67.01\")       Body mass index is 30.34 kg/m².  Discussed the patient's BMI with him. The BMI is above average; BMI management plan is completed.    Physical Exam  Vitals reviewed.   Constitutional:       General: He is not in acute distress.     Appearance: He is well-developed. He is not diaphoretic.   HENT:      Head: Normocephalic and atraumatic.      Right Ear: External ear normal.      Left Ear: External ear normal.      Nose: Nose normal.      Mouth/Throat:      Pharynx: No oropharyngeal exudate.   Eyes:      General: No scleral " icterus.        Right eye: No discharge.         Left eye: No discharge.      Conjunctiva/sclera: Conjunctivae normal.      Pupils: Pupils are equal, round, and reactive to light.   Neck:      Thyroid: No thyromegaly.      Trachea: No tracheal deviation.   Cardiovascular:      Rate and Rhythm: Normal rate and regular rhythm.      Heart sounds: Normal heart sounds. No murmur heard.  No friction rub. No gallop.    Pulmonary:      Effort: Pulmonary effort is normal. No respiratory distress.      Breath sounds: Normal breath sounds. No stridor. No wheezing or rales.   Abdominal:      General: Bowel sounds are normal. There is no distension.      Palpations: Abdomen is soft. There is no mass.      Tenderness: There is no abdominal tenderness. There is no guarding or rebound.      Hernia: There is no hernia in the left inguinal area.   Genitourinary:     Penis: Normal. No tenderness or discharge.       Testes: Normal.         Right: Mass, tenderness or swelling not present.         Left: Mass, tenderness or swelling not present.   Musculoskeletal:         General: No tenderness or deformity. Normal range of motion.      Cervical back: Normal range of motion and neck supple.   Skin:     General: Skin is warm and dry.      Capillary Refill: Capillary refill takes less than 2 seconds.      Coloration: Skin is not pale.      Findings: No erythema or rash.   Neurological:      Mental Status: He is alert and oriented to person, place, and time. He is not disoriented.      Cranial Nerves: No cranial nerve deficit.      Sensory: No sensory deficit.      Motor: No tremor, atrophy or abnormal muscle tone.      Coordination: Coordination normal.      Gait: Gait normal.      Deep Tendon Reflexes: Reflexes are normal and symmetric. Reflexes normal.   Psychiatric:         Behavior: Behavior normal.         Thought Content: Thought content normal.         Judgment: Judgment normal.         Assessment    Medicare Risks and Personalized  Health Plan  CMS Preventative Services Quick Reference  Advance Directive Discussion    The above risks/problems have been discussed with the patient.  Pertinent information has been shared with the patient in the After Visit Summary.  Follow up plans and orders are seen below in the Assessment/Plan Section.      Visit Diagnoses:    Problems Addressed this Visit        Cardiac and Vasculature    Mixed hyperlipidemia (Chronic)    Relevant Orders    Comprehensive Metabolic Panel    Lipid Panel With / Chol / HDL Ratio       Endocrine and Metabolic    Hypothyroidism (acquired) (Chronic)    Relevant Orders    TSH       Mental Health    Depression, major, recurrent, mild (HCC) (Chronic)     Patient's depression is recurrent and is moderate without psychosis. Their depression is currently active and the condition is unchanged. This will be reassessed in 3 months. F/U as described:patient will continue current medication therapy.            Musculoskeletal and Injuries    RESOLVED: Right foot pain     It was the insole that he was wearing. His foot pain is gone.            Other Visit Diagnoses     Encounter for subsequent annual wellness visit in Medicare patient    -  Primary    Other fatigue        Relevant Orders    CBC & Differential    Need for hepatitis C screening test        Relevant Orders    Hepatitis C RNA, Quantitative, PCR (graph)    Flu vaccine need        Relevant Orders    FluLaval/Fluarix >6 Months (8735-8334) (Completed)      Diagnoses       Codes Comments    Encounter for subsequent annual wellness visit in Medicare patient    -  Primary ICD-10-CM: Z00.00  ICD-9-CM: V70.0     Mixed hyperlipidemia     ICD-10-CM: E78.2  ICD-9-CM: 272.2     Hypothyroidism (acquired)     ICD-10-CM: E03.9  ICD-9-CM: 244.9     Other fatigue     ICD-10-CM: R53.83  ICD-9-CM: 780.79     Need for hepatitis C screening test     ICD-10-CM: Z11.59  ICD-9-CM: V73.89     Depression, major, recurrent, mild (HCC)     ICD-10-CM:  F33.0  ICD-9-CM: 296.31     Right foot pain     ICD-10-CM: M79.671  ICD-9-CM: 729.5     Flu vaccine need     ICD-10-CM: Z23  ICD-9-CM: V04.81                Follow Up:  No follow-ups on file.     An After Visit Summary and PPPS were given to the patient.    I wore protective equipment throughout this patient encounter to include mask and gloves. Hand hygiene was performed before donning protective equipment and after removal when leaving the room.

## 2021-10-12 ENCOUNTER — OFFICE VISIT (OUTPATIENT)
Dept: FAMILY MEDICINE CLINIC | Facility: CLINIC | Age: 36
End: 2021-10-12

## 2021-10-12 VITALS
DIASTOLIC BLOOD PRESSURE: 64 MMHG | HEIGHT: 67 IN | OXYGEN SATURATION: 98 % | BODY MASS INDEX: 30.42 KG/M2 | TEMPERATURE: 98.4 F | WEIGHT: 193.8 LBS | HEART RATE: 86 BPM | SYSTOLIC BLOOD PRESSURE: 92 MMHG | RESPIRATION RATE: 18 BRPM

## 2021-10-12 DIAGNOSIS — E78.2 MIXED HYPERLIPIDEMIA: Chronic | ICD-10-CM

## 2021-10-12 DIAGNOSIS — Z00.00 ENCOUNTER FOR SUBSEQUENT ANNUAL WELLNESS VISIT IN MEDICARE PATIENT: Primary | ICD-10-CM

## 2021-10-12 DIAGNOSIS — E03.9 HYPOTHYROIDISM (ACQUIRED): Chronic | ICD-10-CM

## 2021-10-12 DIAGNOSIS — Z23 FLU VACCINE NEED: ICD-10-CM

## 2021-10-12 DIAGNOSIS — Z11.59 NEED FOR HEPATITIS C SCREENING TEST: ICD-10-CM

## 2021-10-12 DIAGNOSIS — R53.83 OTHER FATIGUE: ICD-10-CM

## 2021-10-12 DIAGNOSIS — F33.0 DEPRESSION, MAJOR, RECURRENT, MILD (HCC): Chronic | ICD-10-CM

## 2021-10-12 DIAGNOSIS — M79.671 RIGHT FOOT PAIN: ICD-10-CM

## 2021-10-12 PROBLEM — K44.9 SLIDING HIATAL HERNIA: Status: RESOLVED | Noted: 2021-03-16 | Resolved: 2021-10-12

## 2021-10-12 PROCEDURE — 99214 OFFICE O/P EST MOD 30 MIN: CPT | Performed by: FAMILY MEDICINE

## 2021-10-12 PROCEDURE — G0008 ADMIN INFLUENZA VIRUS VAC: HCPCS | Performed by: FAMILY MEDICINE

## 2021-10-12 PROCEDURE — 90686 IIV4 VACC NO PRSV 0.5 ML IM: CPT | Performed by: FAMILY MEDICINE

## 2021-10-12 PROCEDURE — 1159F MED LIST DOCD IN RCRD: CPT | Performed by: FAMILY MEDICINE

## 2021-10-12 PROCEDURE — 1170F FXNL STATUS ASSESSED: CPT | Performed by: FAMILY MEDICINE

## 2021-10-12 PROCEDURE — G0439 PPPS, SUBSEQ VISIT: HCPCS | Performed by: FAMILY MEDICINE

## 2021-10-14 ENCOUNTER — HOSPITAL ENCOUNTER (OUTPATIENT)
Dept: MRI IMAGING | Facility: HOSPITAL | Age: 36
Discharge: HOME | End: 2021-10-14
Admitting: ORTHOPAEDIC SURGERY

## 2021-10-14 DIAGNOSIS — M25.521 RIGHT ELBOW PAIN: ICD-10-CM

## 2021-10-14 PROCEDURE — 73221 MRI JOINT UPR EXTREM W/O DYE: CPT

## 2021-10-17 NOTE — ASSESSMENT & PLAN NOTE
Patient's depression is recurrent and is moderate without psychosis. Their depression is currently active and the condition is unchanged. This will be reassessed in 3 months. F/U as described:patient will continue current medication therapy.

## 2021-10-23 LAB
ALBUMIN SERPL-MCNC: 4.4 G/DL (ref 4–5)
ALBUMIN/GLOB SERPL: 2 {RATIO} (ref 1.2–2.2)
ALP SERPL-CCNC: 68 IU/L (ref 44–121)
ALT SERPL-CCNC: 48 IU/L (ref 0–44)
AST SERPL-CCNC: 22 IU/L (ref 0–40)
BASOPHILS # BLD AUTO: 0 X10E3/UL (ref 0–0.2)
BASOPHILS NFR BLD AUTO: 0 %
BILIRUB SERPL-MCNC: 0.4 MG/DL (ref 0–1.2)
BUN SERPL-MCNC: 10 MG/DL (ref 6–20)
BUN/CREAT SERPL: 10 (ref 9–20)
CALCIUM SERPL-MCNC: 9.1 MG/DL (ref 8.7–10.2)
CHLORIDE SERPL-SCNC: 104 MMOL/L (ref 96–106)
CHOLEST SERPL-MCNC: 212 MG/DL (ref 100–199)
CHOLEST/HDLC SERPL: 5.7 RATIO (ref 0–5)
CO2 SERPL-SCNC: 25 MMOL/L (ref 20–29)
CREAT SERPL-MCNC: 0.96 MG/DL (ref 0.76–1.27)
EOSINOPHIL # BLD AUTO: 0.1 X10E3/UL (ref 0–0.4)
EOSINOPHIL NFR BLD AUTO: 2 %
ERYTHROCYTE [DISTWIDTH] IN BLOOD BY AUTOMATED COUNT: 12.5 % (ref 11.6–15.4)
GLOBULIN SER CALC-MCNC: 2.2 G/DL (ref 1.5–4.5)
GLUCOSE SERPL-MCNC: 96 MG/DL (ref 65–99)
HCT VFR BLD AUTO: 48.3 % (ref 37.5–51)
HDLC SERPL-MCNC: 37 MG/DL
HGB BLD-MCNC: 16.1 G/DL (ref 13–17.7)
IMM GRANULOCYTES # BLD AUTO: 0 X10E3/UL (ref 0–0.1)
IMM GRANULOCYTES NFR BLD AUTO: 0 %
LDLC SERPL CALC-MCNC: 156 MG/DL (ref 0–99)
LYMPHOCYTES # BLD AUTO: 1.6 X10E3/UL (ref 0.7–3.1)
LYMPHOCYTES NFR BLD AUTO: 24 %
MCH RBC QN AUTO: 29.5 PG (ref 26.6–33)
MCHC RBC AUTO-ENTMCNC: 33.3 G/DL (ref 31.5–35.7)
MCV RBC AUTO: 89 FL (ref 79–97)
MONOCYTES # BLD AUTO: 0.5 X10E3/UL (ref 0.1–0.9)
MONOCYTES NFR BLD AUTO: 7 %
NEUTROPHILS # BLD AUTO: 4.5 X10E3/UL (ref 1.4–7)
NEUTROPHILS NFR BLD AUTO: 67 %
PLATELET # BLD AUTO: 196 X10E3/UL (ref 150–450)
POTASSIUM SERPL-SCNC: 4.4 MMOL/L (ref 3.5–5.2)
PROT SERPL-MCNC: 6.6 G/DL (ref 6–8.5)
RBC # BLD AUTO: 5.46 X10E6/UL (ref 4.14–5.8)
SODIUM SERPL-SCNC: 143 MMOL/L (ref 134–144)
TRIGL SERPL-MCNC: 102 MG/DL (ref 0–149)
TSH SERPL DL<=0.005 MIU/L-ACNC: 4.54 UIU/ML (ref 0.45–4.5)
VLDLC SERPL CALC-MCNC: 19 MG/DL (ref 5–40)
WBC # BLD AUTO: 6.7 X10E3/UL (ref 3.4–10.8)

## 2021-10-24 LAB
HCV RNA SERPL NAA+PROBE-ACNC: NORMAL IU/ML
TEST INFORMATION: NORMAL

## 2021-10-26 ENCOUNTER — TELEPHONE (OUTPATIENT)
Dept: FAMILY MEDICINE CLINIC | Facility: CLINIC | Age: 36
End: 2021-10-26

## 2021-10-26 NOTE — TELEPHONE ENCOUNTER
----- Message from Judy Bassett MD sent at 10/25/2021 10:12 PM EDT -----  Have him see me for lab follow up

## 2021-11-01 ENCOUNTER — OFFICE VISIT (OUTPATIENT)
Dept: ORTHOPEDIC SURGERY | Facility: CLINIC | Age: 36
End: 2021-11-01

## 2021-11-01 VITALS
BODY MASS INDEX: 30.29 KG/M2 | HEART RATE: 77 BPM | DIASTOLIC BLOOD PRESSURE: 73 MMHG | HEIGHT: 67 IN | WEIGHT: 193 LBS | SYSTOLIC BLOOD PRESSURE: 115 MMHG

## 2021-11-01 DIAGNOSIS — M25.521 RIGHT ELBOW PAIN: ICD-10-CM

## 2021-11-01 DIAGNOSIS — M77.11 RIGHT LATERAL EPICONDYLITIS: Primary | ICD-10-CM

## 2021-11-01 PROCEDURE — 99213 OFFICE O/P EST LOW 20 MIN: CPT | Performed by: ORTHOPAEDIC SURGERY

## 2021-11-01 NOTE — PROGRESS NOTES
"     Patient ID: Sourav Tamez is a 36 y.o. male.  Right elbow pain  Returns with right elbow pain    Review of Systems:  Right elbow pain      Objective:    /73   Pulse 77   Ht 170.2 cm (67.01\")   Wt 87.5 kg (193 lb)   BMI 30.22 kg/m²     Physical Examination:      Right elbow demonstrates intact skin.  No bruising.  Mild diffuse pain to the common extensor muscle.  Minimal to no pain over the lateral epicondyle itself.  Elbow range of motion 0-1 30 full pronation supination mild pain on resisted wrist extension.  Sensory and motor exam are intact all distributions. Radial pulse is palpable and capillary refill is less than two seconds to all digits.    Imaging:   MRI demonstrates tenosynovitis of the medial lateral epicondyle    Assessment:    Right elbow epicondylitis    Plan:   I recommend physical therapy as well as tennis elbow stretching.  See me as needed, continue oral medication as needed      Procedures          Disclaimer: Please note that areas of this note were completed with computer voice recognition software.  Quite often unanticipated grammatical, syntax, homophones, and other interpretive errors are inadvertently transcribed by the computer software. Please excuse any errors that have escaped final proofreading.  "

## 2021-11-04 NOTE — PROGRESS NOTES
Subjective   Sourav Tamez is a 36 y.o. male. Presents to [facility]    Chief Complaint   Patient presents with   • Abnormal Lab   • Leg Pain       Abnormal Lab  This is a new problem. The problem has been gradually worsening. Pertinent negatives include no abdominal pain, arthralgias, chest pain, congestion, coughing, fatigue, fever, headaches, nausea, numbness, rash or vomiting. Nothing aggravates the symptoms. He has tried nothing for the symptoms. The treatment provided no relief.   Leg Pain   The incident occurred more than 1 week ago. There was no injury mechanism. Pain location: left calf. Associated symptoms include an inability to bear weight. Pertinent negatives include no loss of motion, loss of sensation, muscle weakness, numbness or tingling. Associated symptoms comments: Swelling  . He reports no foreign bodies present. Nothing aggravates the symptoms. He has tried nothing for the symptoms.        I personally reviewed and updated the patient's allergies, medications, problem list, and past medical, surgical, social, and family history. I have reviewed and confirmed the accuracy of the History of Present Illness and Review of Symptoms as documented by the MA/LPN/RN. Judy Bassett MD    Allergies:  Allergies   Allergen Reactions   • Amoxicillin Swelling and Shortness Of Breath   • Cetirizine Swelling   • Diphenhydramine Swelling   • Doxycycline Swelling   • Escitalopram Swelling     Swelling of throat   • Fexofenadine Anaphylaxis   • Loratadine Swelling   • Montelukast Swelling   • Penicillins Shortness Of Breath   • Prednisone Swelling   • Trazodone Swelling   • Codeine Myalgia   • Latex Rash   • Mucinex [Guaifenesin Er] Cough   • Olanzapine Rash   • Robitussin (Alcohol Free) [Guaifenesin] Cough       Social History:  Social History     Socioeconomic History   • Marital status:    Tobacco Use   • Smoking status: Never Smoker   • Smokeless tobacco: Never Used   Vaping Use   •  Vaping Use: Never used   Substance and Sexual Activity   • Alcohol use: Yes   • Drug use: No   • Sexual activity: Defer       Family History:  Family History   Problem Relation Age of Onset   • Hypertension Mother    • Hyperlipidemia Mother    • Hyperlipidemia Maternal Grandmother        Past Medical History :  Patient Active Problem List   Diagnosis   • Perennial allergic rhinitis   • ADD (attention deficit disorder)   • Asperger's syndrome   • Mixed hyperlipidemia   • Sliding hiatal hernia   • Anxiety   • Depression, major, recurrent, mild (HCC)   • Gastroesophageal reflux disease without esophagitis   • Hypothyroidism (acquired)   • Mild intermittent asthma without complication   • Labile mood   • Encounter for routine adult physical exam with abnormal findings   • Chronic idiopathic constipation   • Overweight   • Pain of fifth toe   • Lateral epicondylitis of right elbow   • Left leg pain       Medication List:    Current Outpatient Medications:   •  ALBUTEROL SULFATE  (90 Base) MCG/ACT inhaler, INHALE 2 PUFFS BY MOUTH EVERY 4 TO 6 HOURS AS NEEDED. MAX 12 PUFFS DAILY., Disp: 18 g, Rfl: 3  •  busPIRone (BUSPAR) 15 MG tablet, Take 15 mg by mouth 2 (two) times a day., Disp: , Rfl:   •  fluticasone (Flovent HFA) 110 MCG/ACT inhaler, Inhale 1 puff 2 (Two) Times a Day., Disp: 12 g, Rfl: 3  •  ibuprofen (ADVIL,MOTRIN) 800 MG tablet, Take 1 tablet by mouth Every 8 (Eight) Hours As Needed for Mild Pain  or Moderate Pain ., Disp: 90 tablet, Rfl: 0  •  lamoTRIgine (LaMICtal) 200 MG tablet, Take 1 tablet by mouth Daily., Disp: 30 tablet, Rfl: 12  •  multivitamin with minerals tablet tablet, Take 1 tablet by mouth Daily., Disp: , Rfl:   •  omeprazole (priLOSEC) 40 MG capsule, TAKE 1 CAPSULE BY MOUTH DAILY, Disp: 90 capsule, Rfl: 0  •  polyethylene glycol (MIRALAX) 17 GM/SCOOP powder, , Disp: , Rfl:   •  Symbicort 80-4.5 MCG/ACT inhaler, Inhale 2 puffs 2 (Two) Times a Day., Disp: , Rfl:   •  Triamcinolone Acetonide  "(Nasacort Allergy 24HR) 55 MCG/ACT nasal inhaler, 2 sprays into the nostril(s) as directed by provider Daily., Disp: , Rfl:   •  metoprolol succinate XL (TOPROL-XL) 25 MG 24 hr tablet, TAKE 1 TABLET BY MOUTH DAILY, Disp: 90 tablet, Rfl: 3  •  pravastatin (PRAVACHOL) 10 MG tablet, Take 1 tablet by mouth Daily., Disp: 30 tablet, Rfl: 12    Past Surgical History:  Past Surgical History:   Procedure Laterality Date   • TONSILLECTOMY  1988       Review of Systems:  Review of Systems   Constitutional: Negative for activity change, fatigue and fever.   HENT: Negative for congestion, ear pain, rhinorrhea, sinus pressure and voice change.    Eyes: Negative for visual disturbance.   Respiratory: Negative for cough and shortness of breath.    Cardiovascular: Negative for chest pain.   Gastrointestinal: Negative for abdominal pain, diarrhea, nausea and vomiting.   Endocrine: Negative for cold intolerance and heat intolerance.   Genitourinary: Negative for frequency and urgency.   Musculoskeletal: Negative for arthralgias.   Skin: Negative for rash.   Neurological: Negative for tingling, syncope and numbness.   Hematological: Does not bruise/bleed easily.   Psychiatric/Behavioral: Negative for depressed mood. The patient is not nervous/anxious.        Physical Exam:  Vital Signs:  Vital Signs:   /76   Pulse 97   Temp 98.2 °F (36.8 °C)   Resp 18   Ht 170.2 cm (67.01\")   Wt 88.7 kg (195 lb 9.6 oz)   SpO2 98%   BMI 30.63 kg/m²     Result Review :   The following data was reviewed by: Judy Bassett MD on 11/05/2021:  CMP    CMP 10/22/21   Glucose 96   BUN 10   Creatinine 0.96   eGFR Non  Am 100   eGFR African Am 115   Sodium 143   Potassium 4.4   Chloride 104   Calcium 9.1   Total Protein 6.6   Albumin 4.4   Globulin 2.2   Total Bilirubin 0.4   Alkaline Phosphatase 68   AST (SGOT) 22   ALT (SGPT) 48 (A)   (A) Abnormal value       Comments are available for some flowsheets but are not being displayed.       "     Lipid Panel    Lipid Panel 10/22/21   Total Cholesterol 212 (A)   Triglycerides 102   HDL Cholesterol 37 (A)   VLDL Cholesterol 19   LDL Cholesterol  156 (A)   (A) Abnormal value                     Physical Exam  Vitals reviewed.   Constitutional:       Appearance: Normal appearance. He is well-developed.   HENT:      Head: Normocephalic and atraumatic.   Eyes:      General:         Right eye: No discharge.         Left eye: No discharge.   Cardiovascular:      Rate and Rhythm: Normal rate and regular rhythm.      Heart sounds: Normal heart sounds. No murmur heard.  No friction rub. No gallop.    Pulmonary:      Effort: Pulmonary effort is normal. No respiratory distress.      Breath sounds: Normal breath sounds. No wheezing or rales.   Musculoskeletal:      Left lower leg: Normal. No swelling or tenderness. No edema.      Comments: Negative tameka sign. No erythema   Skin:     General: Skin is warm and dry.      Findings: No rash.   Neurological:      Mental Status: He is alert and oriented to person, place, and time.      Coordination: Coordination normal.      Gait: Gait normal.   Psychiatric:         Behavior: Behavior is cooperative.         Assessment and Plan:  Problems Addressed this Visit        Cardiac and Vasculature    Mixed hyperlipidemia - Primary (Chronic)     Lipid abnormalities are worsening.  Nutritional counseling was provided. and Pharmacotherapy as ordered.  Lipids will be reassessed in 3 months.         Relevant Medications    pravastatin (PRAVACHOL) 10 MG tablet       Endocrine and Metabolic    Hypothyroidism (acquired) (Chronic)     Mild  I think it is thyroiditis. Will recheck in 3 months            Musculoskeletal and Injuries    Left leg pain     Negative exam.   May be muscular pain. Discussed ice and nsaids as needed. If worsens he is to let me know.            Diagnoses       Codes Comments    Mixed hyperlipidemia    -  Primary ICD-10-CM: E78.2  ICD-9-CM: 272.2     Hypothyroidism  (acquired)     ICD-10-CM: E03.9  ICD-9-CM: 244.9     Left leg pain     ICD-10-CM: M79.605  ICD-9-CM: 729.5            An After Visit Summary and PPPS were given to the patient.       I wore protective equipment throughout this patient encounter to include mask. Hand hygiene was performed before donning protective equipment and after removal when leaving the room.

## 2021-11-05 ENCOUNTER — OFFICE VISIT (OUTPATIENT)
Dept: FAMILY MEDICINE CLINIC | Facility: CLINIC | Age: 36
End: 2021-11-05

## 2021-11-05 VITALS
TEMPERATURE: 98.2 F | HEART RATE: 97 BPM | WEIGHT: 195.6 LBS | RESPIRATION RATE: 18 BRPM | BODY MASS INDEX: 30.7 KG/M2 | SYSTOLIC BLOOD PRESSURE: 118 MMHG | DIASTOLIC BLOOD PRESSURE: 76 MMHG | OXYGEN SATURATION: 98 % | HEIGHT: 67 IN

## 2021-11-05 DIAGNOSIS — E78.2 MIXED HYPERLIPIDEMIA: Primary | Chronic | ICD-10-CM

## 2021-11-05 DIAGNOSIS — E03.9 HYPOTHYROIDISM (ACQUIRED): Chronic | ICD-10-CM

## 2021-11-05 DIAGNOSIS — M79.605 LEFT LEG PAIN: ICD-10-CM

## 2021-11-05 PROCEDURE — 99214 OFFICE O/P EST MOD 30 MIN: CPT | Performed by: FAMILY MEDICINE

## 2021-11-05 RX ORDER — PRAVASTATIN SODIUM 10 MG
10 TABLET ORAL DAILY
Qty: 30 TABLET | Refills: 12 | Status: SHIPPED | OUTPATIENT
Start: 2021-11-05 | End: 2022-08-12 | Stop reason: SDUPTHER

## 2021-11-08 RX ORDER — METOPROLOL SUCCINATE 25 MG/1
TABLET, EXTENDED RELEASE ORAL
Qty: 90 TABLET | Refills: 3 | Status: SHIPPED | OUTPATIENT
Start: 2021-11-08 | End: 2023-01-12 | Stop reason: SDUPTHER

## 2021-11-10 NOTE — ASSESSMENT & PLAN NOTE
Negative exam.   May be muscular pain. Discussed ice and nsaids as needed. If worsens he is to let me know.

## 2021-12-28 RX ORDER — BUDESONIDE AND FORMOTEROL FUMARATE DIHYDRATE 80; 4.5 UG/1; UG/1
2 AEROSOL RESPIRATORY (INHALATION) 2 TIMES DAILY
Qty: 6.9 G | Refills: 12 | Status: SHIPPED | OUTPATIENT
Start: 2021-12-28 | End: 2022-06-09 | Stop reason: SDUPTHER

## 2021-12-28 NOTE — TELEPHONE ENCOUNTER
----- Message from Sourav Tamez sent at 12/27/2021  7:52 PM EST -----  Regarding: Booster vaccine   I have a maintenance inhaler that’s symbicort   I tried to get it refilled at [facility] in [place]   But for some reason it was stopped being refilled and the pharmacy told us to get ahold of you to see about it can you please let me know what’s going on with it catlike there is no maintenance inhaler in my medicine file at [facility] to get filled just a rescue inhaler

## 2022-01-04 ENCOUNTER — TREATMENT (OUTPATIENT)
Dept: PHYSICAL THERAPY | Facility: CLINIC | Age: 37
End: 2022-01-04

## 2022-01-04 DIAGNOSIS — M79.631 RIGHT FOREARM PAIN: ICD-10-CM

## 2022-01-04 DIAGNOSIS — M25.521 RIGHT ELBOW PAIN: ICD-10-CM

## 2022-01-04 DIAGNOSIS — M77.11 LATERAL EPICONDYLITIS OF RIGHT ELBOW: Primary | ICD-10-CM

## 2022-01-04 PROCEDURE — 97140 MANUAL THERAPY 1/> REGIONS: CPT | Performed by: PHYSICAL THERAPIST

## 2022-01-04 PROCEDURE — 97161 PT EVAL LOW COMPLEX 20 MIN: CPT | Performed by: PHYSICAL THERAPIST

## 2022-01-04 PROCEDURE — 97035 APP MDLTY 1+ULTRASOUND EA 15: CPT | Performed by: PHYSICAL THERAPIST

## 2022-01-04 NOTE — PROGRESS NOTES
Physical Therapy Initial Evaluation and Plan of Care    Patient: Sourav Tamez   : 1985  Diagnosis/ICD-10 Code:  Lateral epicondylitis of right elbow [M77.11]  Referring practitioner: Devan Davenport, *  Date of Initial Visit: 2022  Today's Date: 2022  Patient seen for 1 sessions           Subjective Questionnaire: QuickDASH: 41% impairment      Subjective Evaluation    History of Present Illness  Mechanism of injury: Pt reports having R elbow pain since 2021 and has progressively worsened. States he fell prior to this and landed on forearm. Pt states he has pain on back of hand. Unable to lift things with R hand. Unable to  full coffee cup. Pain has improved since last month but weakness continues. Pt states he plays video games a lot and this causes increased pain. If he keeps elbow bent, it gets stiff. States he feels like forearm is all knotted up. No tingling in hands. If he makes a fist it pulls on the back of his hand. Minimal pain at rest.    MRI: 1. Tendinosis of the common extensor tendon with mild tenosynovitis and   partial tearing of the common extensor tendon at the enthesis. No   complete tear identified.   2. Tendinosis of the common flexor tendon with no evidence of a focal   defect or tenosynovitis.       Patient Occupation: disabled Quality of life: good    Pain  Current pain ratin  At best pain ratin  At worst pain ratin  Location: R hand and forearm  Quality: dull ache, radiating, tight and pulling  Relieving factors: change in position  Aggravating factors: lifting, keyboarding, movement, outstretched reach, repetitive movement, sleeping and prolonged positioning  Progression: improved    Hand dominance: right    Patient Goals  Patient goals for therapy: decreased pain, increased motion, increased strength and independence with ADLs/IADLs             Objective          Palpation     Right Tenderness of the brachioradialis, supinator, triceps, wrist  extensors and wrist flexors.   Trigger point to brachioradialis.     Tenderness     Right Elbow   Tenderness in the lateral epicondyle.     Right Wrist/Hand   Tenderness in the lateral epicondyle.     Active Range of Motion     Left Wrist   Normal active range of motion    Right Wrist   Normal active range of motion    Strength/Myotome Testing     Left Elbow   Flexion: 5  Extension: 5  Forearm supination: 5  Forearm pronation: 5    Right Elbow   Flexion: 4+  Extension: 4-  Forearm supination: 4  Forearm pronation: 4-    Left Wrist/Hand   Normal wrist strength     (2nd hand position)    Trial 1: 70 lbs    Trial 2: 60 lbs    Trial 3: 60 lbs    Average: 63.33 lbs    Right Wrist/Hand   Wrist extension: 4  Wrist flexion: 4  Radial deviation: 4  Ulnar deviation: 4     (2nd hand position)     Trial 1: 45 lbs    Trial 2: 50 lbs    Trial 3: 40 lbs    Average: 45 lbs    Additional Strength Details  2-point pinch: L = 10#, R = 9#          Assessment & Plan     Assessment  Impairments: abnormal coordination, abnormal muscle firing, abnormal or restricted ROM, activity intolerance, impaired physical strength, lacks appropriate home exercise program, pain with function and weight-bearing intolerance  Functional Limitations: carrying objects, lifting, pulling, pushing, uncomfortable because of pain and unable to perform repetitive tasks  Assessment details: Pt is 35 yo male with c/o R elbow and forearm pain x4 mo. Pt presents with decreased strength of R elbow, wrist, and hand. Significant decrease in R  strength. Pt is having pain with all movement of R elbow. Tenderness and increased tension in R brachioradialis. Difficulty holding objects in R hand and cannot lift a coffee cup. Quick DASH indicates 41% impairment.    Patient presents with the impairments listed above and based on the objective findings and the physical therapy evaluation, the patient’s condition has the potential to improve in response to therapy.    The patient’s condition and/or services required are at a level of complexity that necessitates the skill & supervision of a physical therapist.    Prognosis: good    Goals  Plan Goals: STG:  - Pt to report no pulling at posterior hand when making a full fist in 3 weeks.  - Pt to report 25% improvement in ability to lift a coffee cup in 3 weeks.  - Pt to be independent with HEP in 3 weeks.  LTG:  - Improve Quick DASH to 20% or less impairment by discharge.  - Increase R elbow and wrist strength to 4+/5 or greater by discharge.  - Pt to hold objects in R hand with no difficulty by discharge.  - Max pain rating at 2-3/10 with activity by discharge.    Plan  Therapy options: will be seen for skilled therapy services  Planned modality interventions: electrical stimulation/Russian stimulation, thermotherapy (hydrocollator packs), ultrasound and dry needling  Planned therapy interventions: balance/weight-bearing training, body mechanics training, fine motor coordination training, flexibility, functional ROM exercises, home exercise program, joint mobilization, manual therapy, motor coordination training, soft tissue mobilization, strengthening, stretching and therapeutic activities  Frequency: 2x week  Duration in weeks: 12  Treatment plan discussed with: patient        Timed:         Manual Therapy:    15     mins  72856;     Therapeutic Exercise:    5     mins  45254;     Neuromuscular Tabitha:        mins  92564;    Therapeutic Activity:          mins  01045;     Gait Training:           mins  72160;     Ultrasound:     10    mins  79329;    Ionto                                   mins   96466  Can Repos          mins 85347  Self - Care                          mins  70664    Un-Timed:  Electrical Stimulation:         mins  35701 ( );  Dry Needling          20561/20560  Traction          mins 45721  Low Eval     20     Mins  95660  Mod Eval          Mins  29484  High Eval                            Mins   85717      Timed Treatment:   30   mins   Total Treatment:     50   mins      PT SIGNATURE: Zo Jim PT, CLT, Cert DN  [place] Lic # [id number]  Electronically signed by Zo Jim PT, 01/04/22, 9:36 AM EST    Initial Certification  Certification Period: 1/4/2022 thru 4/3/2022  I certify that the therapy services are furnished while this patient is under my care.  The services outlined above are required by this patient, and will be reviewed every 90 days.     PHYSICIAN: Devan Davenport MD ____________________________________________________     DATE:  ____________________________________________    Please sign and return via fax to [phone]. Thank you, [facility].

## 2022-01-21 ENCOUNTER — TREATMENT (OUTPATIENT)
Dept: PHYSICAL THERAPY | Facility: CLINIC | Age: 37
End: 2022-01-21

## 2022-01-21 DIAGNOSIS — M79.631 RIGHT FOREARM PAIN: ICD-10-CM

## 2022-01-21 DIAGNOSIS — M25.521 RIGHT ELBOW PAIN: ICD-10-CM

## 2022-01-21 DIAGNOSIS — M77.11 LATERAL EPICONDYLITIS OF RIGHT ELBOW: Primary | ICD-10-CM

## 2022-01-21 PROCEDURE — 97110 THERAPEUTIC EXERCISES: CPT | Performed by: PHYSICAL THERAPIST

## 2022-01-21 PROCEDURE — 97035 APP MDLTY 1+ULTRASOUND EA 15: CPT | Performed by: PHYSICAL THERAPIST

## 2022-01-21 PROCEDURE — 97140 MANUAL THERAPY 1/> REGIONS: CPT | Performed by: PHYSICAL THERAPIST

## 2022-01-21 NOTE — PROGRESS NOTES
Physical Therapy Daily Progress Note      Patient: Sourav Tamez   : 1985  Diagnosis/ICD-10 Code:  Lateral epicondylitis of right elbow [M77.11]  Referring practitioner: Devan Davenport, *  Date of Initial Visit: Type: THERAPY  Noted: 2022  Today's Date: 2022  Patient seen for 2 sessions         Sourav Tamez reports: his pain has improved significantly in his forearm having no pain today Pt. States he still gets cramping and soreness in forearm and wrist after extensive steven endeavors however the response to that is less as well.    Objective   See Exercise, Manual, and Modality Logs for complete treatment.     Access Code: [email or web address]  URL: [email or web address]  Date: 2022  Prepared by: Alia Cortez    Exercises  Seated Wrist Extension with Dumbbell - 1 x daily - 7 x weekly - 1 sets - 10 reps  Seated Wrist Flexion with Dumbbell - 1 x daily - 7 x weekly - 1 sets - 10 reps  Seated Wrist Supination Pronation with Can - 1 x daily - 7 x weekly - 1 sets - 10 reps    Assessment/Plan   Pt. responds well to forearm stretch and basic wrist strengthening and is encouraged to take appropriate breaks to avoid overwhelming fatigue and soreness while steven. Pt. is encouraged to strengthen at home as well as in therapy for optimal outcome.    Goals  Plan Goals: STG:  - Pt to report no pulling at posterior hand when making a full fist in 3 weeks.  - Pt to report 25% improvement in ability to lift a coffee cup in 3 weeks.  - Pt to be independent with HEP in 3 weeks.  LTG:  - Improve Quick DASH to 20% or less impairment by discharge.  - Increase R elbow and wrist strength to 4+/5 or greater by discharge.  - Pt to hold objects in R hand with no difficulty by discharge.  - Max pain rating at 2-3/10 with activity by discharge.    Progress strengthening /stabilization /functional activity           Timed:         Manual Therapy:    15     mins  61741;     Therapeutic Exercise:    20     mins   74693;     Ultrasound:    10      mins  96093;        Timed Treatment:   45   mins   Total Treatment:     45   mins    Alia Cortez PTA  Physical Therapist Assistant License #[id number]

## 2022-02-18 ENCOUNTER — TREATMENT (OUTPATIENT)
Dept: PHYSICAL THERAPY | Facility: CLINIC | Age: 37
End: 2022-02-18

## 2022-02-18 DIAGNOSIS — M77.11 LATERAL EPICONDYLITIS OF RIGHT ELBOW: Primary | ICD-10-CM

## 2022-02-18 DIAGNOSIS — M25.521 RIGHT ELBOW PAIN: ICD-10-CM

## 2022-02-18 DIAGNOSIS — M79.631 RIGHT FOREARM PAIN: ICD-10-CM

## 2022-02-18 PROCEDURE — 97035 APP MDLTY 1+ULTRASOUND EA 15: CPT | Performed by: PHYSICAL THERAPIST

## 2022-02-18 PROCEDURE — 97140 MANUAL THERAPY 1/> REGIONS: CPT | Performed by: PHYSICAL THERAPIST

## 2022-02-18 PROCEDURE — 97110 THERAPEUTIC EXERCISES: CPT | Performed by: PHYSICAL THERAPIST

## 2022-02-18 NOTE — PROGRESS NOTES
Physical Therapy Daily Progress Note      Patient: Sourav Tamez   : 1985  Diagnosis/ICD-10 Code:  Lateral epicondylitis of right elbow [M77.11]  Referring practitioner: Devan Davenport, *  Date of Initial Visit: Type: THERAPY  Noted: 2022  Today's Date: 2022  Patient seen for 3 sessions         Sourav Tamez reports: his wrist and hand pain has improved and his elbow soreness is much less significant stating it is still present but he has not had near the response when marathon steven anymore and feels he has learned to manage it through exercise and stretch better.    Objective   See Exercise, Manual, and Modality Logs for complete treatment.     Assessment/Plan  Pt. responds well to stretch and treatment this date adding edge tool for increasing blood flow through fascial release at the lateral elbow and forearm musculature. Tightness is noted but much less significant this date. Pt. Increased resistance with all activity this date and had great response completing all activity without pain.    Goals  Plan Goals: STG:  - Pt to report no pulling at posterior hand when making a full fist in 3 weeks.  - Pt to report 25% improvement in ability to lift a coffee cup in 3 weeks.  - Pt to be independent with HEP in 3 weeks.  LTG:  - Improve Quick DASH to 20% or less impairment by discharge.  - Increase R elbow and wrist strength to 4+/5 or greater by discharge.  - Pt to hold objects in R hand with no difficulty by discharge.  - Max pain rating at 2-3/10 with activity by discharge.    Progress strengthening /stabilization /functional activity           Timed:         Manual Therapy:    15     mins  73264;     Therapeutic Exercise:    25     mins  74170;     Ultrasound:     10     mins  59162;        Timed Treatment:   50   mins   Total Treatment:     50   mins    Alia Cortez PTA  Physical Therapist Assistant License #[id number]

## 2022-02-22 DIAGNOSIS — E78.2 MIXED HYPERLIPIDEMIA: Primary | ICD-10-CM

## 2022-02-23 LAB
ALBUMIN SERPL-MCNC: 4.8 G/DL (ref 4–5)
ALBUMIN/GLOB SERPL: 2.2 {RATIO} (ref 1.2–2.2)
ALP SERPL-CCNC: 68 IU/L (ref 44–121)
ALT SERPL-CCNC: 56 IU/L (ref 0–44)
AST SERPL-CCNC: 25 IU/L (ref 0–40)
BILIRUB SERPL-MCNC: 0.3 MG/DL (ref 0–1.2)
BUN SERPL-MCNC: 13 MG/DL (ref 6–20)
BUN/CREAT SERPL: 14 (ref 9–20)
CALCIUM SERPL-MCNC: 9.4 MG/DL (ref 8.7–10.2)
CHLORIDE SERPL-SCNC: 103 MMOL/L (ref 96–106)
CHOLEST SERPL-MCNC: 190 MG/DL (ref 100–199)
CHOLEST/HDLC SERPL: 4.5 RATIO (ref 0–5)
CO2 SERPL-SCNC: 22 MMOL/L (ref 20–29)
CREAT SERPL-MCNC: 0.95 MG/DL (ref 0.76–1.27)
GLOBULIN SER CALC-MCNC: 2.2 G/DL (ref 1.5–4.5)
GLUCOSE SERPL-MCNC: 99 MG/DL (ref 65–99)
HDLC SERPL-MCNC: 42 MG/DL
LDLC SERPL CALC-MCNC: 126 MG/DL (ref 0–99)
POTASSIUM SERPL-SCNC: 4.5 MMOL/L (ref 3.5–5.2)
PROT SERPL-MCNC: 7 G/DL (ref 6–8.5)
SODIUM SERPL-SCNC: 143 MMOL/L (ref 134–144)
TRIGL SERPL-MCNC: 121 MG/DL (ref 0–149)
VLDLC SERPL CALC-MCNC: 22 MG/DL (ref 5–40)

## 2022-02-24 ENCOUNTER — OFFICE VISIT (OUTPATIENT)
Dept: FAMILY MEDICINE CLINIC | Facility: CLINIC | Age: 37
End: 2022-02-24

## 2022-02-24 VITALS
DIASTOLIC BLOOD PRESSURE: 76 MMHG | SYSTOLIC BLOOD PRESSURE: 108 MMHG | RESPIRATION RATE: 18 BRPM | WEIGHT: 184.2 LBS | HEART RATE: 85 BPM | OXYGEN SATURATION: 98 % | BODY MASS INDEX: 28.91 KG/M2 | TEMPERATURE: 97.5 F | HEIGHT: 67 IN

## 2022-02-24 DIAGNOSIS — E78.2 MIXED HYPERLIPIDEMIA: Primary | Chronic | ICD-10-CM

## 2022-02-24 DIAGNOSIS — F33.0 DEPRESSION, MAJOR, RECURRENT, MILD: Chronic | ICD-10-CM

## 2022-02-24 DIAGNOSIS — E03.9 HYPOTHYROIDISM (ACQUIRED): Chronic | ICD-10-CM

## 2022-02-24 DIAGNOSIS — F41.9 ANXIETY: Chronic | ICD-10-CM

## 2022-02-24 PROCEDURE — 99214 OFFICE O/P EST MOD 30 MIN: CPT | Performed by: FAMILY MEDICINE

## 2022-02-24 RX ORDER — LAMOTRIGINE 200 MG/1
200 TABLET ORAL DAILY
Qty: 30 TABLET | Refills: 12 | Status: SHIPPED | OUTPATIENT
Start: 2022-02-24 | End: 2023-03-07

## 2022-02-24 NOTE — PROGRESS NOTES
Subjective   Sourav Tamez is a 37 y.o. male. Presents to [facility]    Chief Complaint   Patient presents with   • Hyperlipidemia   • Depression       Hyperlipidemia  This is a chronic problem. The current episode started more than 1 year ago. The problem is controlled. Exacerbating diseases include hypothyroidism. He has no history of diabetes. Pertinent negatives include no chest pain or shortness of breath. Current antihyperlipidemic treatment includes statins. The current treatment provides moderate improvement of lipids. There are no compliance problems.  Risk factors for coronary artery disease include male sex, dyslipidemia and obesity.   Depression  Visit Type: follow-up  Patient presents with the following symptoms: irritability and memory impairment.  Patient is not experiencing: decreased concentration, depressed mood, dizziness, dry mouth, excessive worry, feelings of hopelessness, feelings of worthlessness, insomnia, nausea, nervousness/anxiety, shortness of breath, suicidal ideas, suicidal planning and thoughts of death.  Frequency of symptoms: occasionally   Severity: moderate   Sleep quality: good  Nighttime awakenings: occasional  Compliance with medications:  %             I personally reviewed and updated the patient's allergies, medications, problem list, and past medical, surgical, social, and family history. I have reviewed and confirmed the accuracy of the History of Present Illness and Review of Symptoms as documented by the MA/LPN/RN. Judy Bassett MD    Allergies:  Allergies   Allergen Reactions   • Amoxicillin Swelling and Shortness Of Breath   • Cetirizine Swelling   • Diphenhydramine Swelling   • Doxycycline Swelling   • Escitalopram Swelling     Swelling of throat   • Fexofenadine Anaphylaxis   • Loratadine Swelling   • Montelukast Swelling   • Penicillins Shortness Of Breath   • Prednisone Swelling   • Trazodone Swelling   • Codeine Myalgia   • Latex Rash   •  Mucinex [Guaifenesin Er] Cough   • Olanzapine Rash   • Robitussin (Alcohol Free) [Guaifenesin] Cough       Social History:  Social History     Socioeconomic History   • Marital status:    Tobacco Use   • Smoking status: Never Smoker   • Smokeless tobacco: Never Used   Vaping Use   • Vaping Use: Never used   Substance and Sexual Activity   • Alcohol use: Yes   • Drug use: No   • Sexual activity: Defer       Family History:  Family History   Problem Relation Age of Onset   • Hypertension Mother    • Hyperlipidemia Mother    • Hyperlipidemia Maternal Grandmother        Past Medical History :  Patient Active Problem List   Diagnosis   • Perennial allergic rhinitis   • ADD (attention deficit disorder)   • Asperger's syndrome   • Mixed hyperlipidemia   • Sliding hiatal hernia   • Anxiety   • Depression, major, recurrent, mild (HCC)   • Gastroesophageal reflux disease without esophagitis   • Hypothyroidism (acquired)   • Mild intermittent asthma without complication   • Labile mood   • Encounter for routine adult physical exam with abnormal findings   • Chronic idiopathic constipation   • Overweight   • Pain of fifth toe   • Lateral epicondylitis of right elbow   • Left leg pain       Medication List:    Current Outpatient Medications:   •  ALBUTEROL SULFATE  (90 Base) MCG/ACT inhaler, INHALE 2 PUFFS BY MOUTH EVERY 4 TO 6 HOURS AS NEEDED. MAX 12 PUFFS DAILY., Disp: 18 g, Rfl: 3  •  budesonide-formoterol (Symbicort) 80-4.5 MCG/ACT inhaler, Inhale 2 puffs 2 (Two) Times a Day., Disp: 6.9 g, Rfl: 12  •  busPIRone (BUSPAR) 15 MG tablet, Take 15 mg by mouth 2 (two) times a day., Disp: , Rfl:   •  ibuprofen (ADVIL,MOTRIN) 800 MG tablet, Take 1 tablet by mouth Every 8 (Eight) Hours As Needed for Mild Pain  or Moderate Pain ., Disp: 90 tablet, Rfl: 0  •  lamoTRIgine (LaMICtal) 200 MG tablet, Take 1 tablet by mouth Daily., Disp: 30 tablet, Rfl: 12  •  metoprolol succinate XL (TOPROL-XL) 25 MG 24 hr tablet, TAKE 1  "TABLET BY MOUTH DAILY, Disp: 90 tablet, Rfl: 3  •  multivitamin with minerals tablet tablet, Take 1 tablet by mouth Daily., Disp: , Rfl:   •  omeprazole (priLOSEC) 40 MG capsule, TAKE 1 CAPSULE BY MOUTH DAILY, Disp: 90 capsule, Rfl: 0  •  polyethylene glycol (MIRALAX) 17 GM/SCOOP powder, , Disp: , Rfl:   •  pravastatin (PRAVACHOL) 10 MG tablet, Take 1 tablet by mouth Daily., Disp: 30 tablet, Rfl: 12  •  Triamcinolone Acetonide (Nasacort Allergy 24HR) 55 MCG/ACT nasal inhaler, 2 sprays into the nostril(s) as directed by provider Daily., Disp: , Rfl:     Past Surgical History:  Past Surgical History:   Procedure Laterality Date   • TONSILLECTOMY  1988       Review of Systems:  Review of Systems   Constitutional: Positive for irritability. Negative for activity change and fever.   HENT: Negative for ear pain, rhinorrhea, sinus pressure and voice change.    Eyes: Negative for visual disturbance.   Respiratory: Negative for cough and shortness of breath.    Cardiovascular: Negative for chest pain.   Gastrointestinal: Negative for abdominal pain, diarrhea, nausea and vomiting.   Endocrine: Negative for cold intolerance and heat intolerance.   Genitourinary: Negative for frequency and urgency.   Musculoskeletal: Negative for arthralgias.   Skin: Negative for rash.   Neurological: Negative for syncope.   Hematological: Does not bruise/bleed easily.   Psychiatric/Behavioral: Negative for decreased concentration, suicidal ideas and depressed mood. The patient is not nervous/anxious and does not have insomnia.        Physical Exam:  Vital Signs:  Vital Signs:   /76 (BP Location: Right arm, Patient Position: Sitting, Cuff Size: Adult)   Pulse 85   Temp 97.5 °F (36.4 °C)   Resp 18   Ht 170.2 cm (67.01\")   Wt 83.6 kg (184 lb 3.2 oz)   SpO2 98%   BMI 28.84 kg/m²     Result Review :     CMP    CMP 10/22/21 2/22/22   Glucose 96 99   BUN 10 13   Creatinine 0.96 0.95   eGFR Non African Am 100 100   eGFR  Am 115 116 "   Sodium 143 143   Potassium 4.4 4.5   Chloride 104 103   Calcium 9.1 9.4   Total Protein 6.6 7.0   Albumin 4.4 4.8   Globulin 2.2 2.2   Total Bilirubin 0.4 0.3   Alkaline Phosphatase 68 68   AST (SGOT) 22 25   ALT (SGPT) 48 (A) 56 (A)   (A) Abnormal value       Comments are available for some flowsheets but are not being displayed.           Lipid Panel    Lipid Panel 10/22/21 2/22/22   Total Cholesterol 212 (A) 190   Triglycerides 102 121   HDL Cholesterol 37 (A) 42   VLDL Cholesterol 19 22   LDL Cholesterol  156 (A) 126 (A)   (A) Abnormal value                     Physical Exam  Vitals reviewed.   Constitutional:       Appearance: Normal appearance. He is well-developed.   HENT:      Head: Normocephalic and atraumatic.   Eyes:      General:         Right eye: No discharge.         Left eye: No discharge.   Cardiovascular:      Rate and Rhythm: Normal rate and regular rhythm.      Heart sounds: Normal heart sounds. No murmur heard.  No friction rub. No gallop.    Pulmonary:      Effort: Pulmonary effort is normal. No respiratory distress.      Breath sounds: Normal breath sounds. No wheezing or rales.   Skin:     General: Skin is warm and dry.      Findings: No rash.   Neurological:      Mental Status: He is alert and oriented to person, place, and time.      Coordination: Coordination normal.      Gait: Gait normal.   Psychiatric:         Behavior: Behavior is cooperative.         Assessment and Plan:  Problems Addressed this Visit        Cardiac and Vasculature    Mixed hyperlipidemia - Primary (Chronic)     Lipid abnormalities are improving with treatment.  Nutritional counseling was provided.  Lipids will be reassessed in 3 months.            Endocrine and Metabolic    Hypothyroidism (acquired) (Chronic)    Relevant Orders    TSH       Mental Health    Anxiety (Chronic)    Depression, major, recurrent, mild (HCC) (Chronic)     Patient's depression is recurrent and is moderate without psychosis. Their  depression is currently active and the condition is unchanged. This will be reassessed at the next regular appointment. F/U as described:patient will continue current medication therapy.         Relevant Medications    lamoTRIgine (LaMICtal) 200 MG tablet      Diagnoses       Codes Comments    Mixed hyperlipidemia    -  Primary ICD-10-CM: E78.2  ICD-9-CM: 272.2     Depression, major, recurrent, mild (HCC)     ICD-10-CM: F33.0  ICD-9-CM: 296.31     Hypothyroidism (acquired)     ICD-10-CM: E03.9  ICD-9-CM: 244.9     Anxiety     ICD-10-CM: F41.9  ICD-9-CM: 300.00            An After Visit Summary and PPPS were given to the patient.       I wore protective equipment throughout this patient encounter to include mask. Hand hygiene was performed before donning protective equipment and after removal when leaving the room.

## 2022-02-25 ENCOUNTER — TREATMENT (OUTPATIENT)
Dept: PHYSICAL THERAPY | Facility: CLINIC | Age: 37
End: 2022-02-25

## 2022-02-25 DIAGNOSIS — M25.521 RIGHT ELBOW PAIN: ICD-10-CM

## 2022-02-25 DIAGNOSIS — M79.631 RIGHT FOREARM PAIN: ICD-10-CM

## 2022-02-25 DIAGNOSIS — M77.11 LATERAL EPICONDYLITIS OF RIGHT ELBOW: Primary | ICD-10-CM

## 2022-02-25 PROCEDURE — 97110 THERAPEUTIC EXERCISES: CPT | Performed by: PHYSICAL THERAPIST

## 2022-02-25 PROCEDURE — 97140 MANUAL THERAPY 1/> REGIONS: CPT | Performed by: PHYSICAL THERAPIST

## 2022-02-25 PROCEDURE — 97530 THERAPEUTIC ACTIVITIES: CPT | Performed by: PHYSICAL THERAPIST

## 2022-02-25 NOTE — PROGRESS NOTES
Physical Therapy Daily Progress Note      Patient: Sourav Tamez   : 1985  Diagnosis/ICD-10 Code:  Lateral epicondylitis of right elbow [M77.11]  Referring practitioner: Devan Davenport, *  Date of Initial Visit: Type: THERAPY  Noted: 2022  Today's Date: 2022  Patient seen for 4 sessions         Sourav Tamez reports: he has no pain or cramping in his forearm at this time and states his exercise and steven tolerance is also improving. Pt. States therapy exercises have done a great job of improving his pain and ability.    Objective   See Exercise, Manual, and Modality Logs for complete treatment.     Assessment/Plan   Pt. Tolerates exercise and stretch well having good response to fascial release with edge tool and ultrasound for relieve stress and tension in muscle tissue and fibers at proximal forearm. Pt. May be ready to discuss discharge at next visit as problems have significantly resolved and Pt. Demonstrates confidence in HEP.    Progress strengthening /stabilization /functional activity           Timed:         Manual Therapy:    15     mins  19785;     Therapeutic Exercise:    15     mins  27046;     Therapeutic Activity:     10     mins  96078;     Ultrasound:     10     mins  97336;        Timed Treatment:   50   mins   Total Treatment:     50   mins    Alia Cortez PTA  Physical Therapist Assistant License #[id number]

## 2022-03-11 ENCOUNTER — TREATMENT (OUTPATIENT)
Dept: PHYSICAL THERAPY | Facility: CLINIC | Age: 37
End: 2022-03-11

## 2022-03-11 DIAGNOSIS — M79.631 RIGHT FOREARM PAIN: ICD-10-CM

## 2022-03-11 DIAGNOSIS — M77.11 LATERAL EPICONDYLITIS OF RIGHT ELBOW: Primary | ICD-10-CM

## 2022-03-11 DIAGNOSIS — M25.521 RIGHT ELBOW PAIN: ICD-10-CM

## 2022-03-11 PROCEDURE — 97035 APP MDLTY 1+ULTRASOUND EA 15: CPT | Performed by: PHYSICAL THERAPIST

## 2022-03-11 PROCEDURE — 97140 MANUAL THERAPY 1/> REGIONS: CPT | Performed by: PHYSICAL THERAPIST

## 2022-03-11 PROCEDURE — 97530 THERAPEUTIC ACTIVITIES: CPT | Performed by: PHYSICAL THERAPIST

## 2022-03-11 PROCEDURE — 97110 THERAPEUTIC EXERCISES: CPT | Performed by: PHYSICAL THERAPIST

## 2022-03-11 NOTE — PROGRESS NOTES
Physical Therapy Daily Progress Note      Patient: Sourav Tamez   : 1985  Diagnosis/ICD-10 Code:  Lateral epicondylitis of right elbow [M77.11]  Referring practitioner: Devan Davenport, *  Date of Initial Visit: Type: THERAPY  Noted: 2022  Today's Date: 3/11/2022  Patient seen for 5 sessions         Sourav Tamez reports: he continues to have improved tolerance to prolonged steven as well as not having spasm throughout his normal day Pt. States now his biggest concern is the limited range he has with supination.    Objective   See Exercise, Manual, and Modality Logs for complete treatment.     Assessment/Plan   Pt. wrist strengthening was progressed this date and positive response was noted stating it was more fatiguing but not painful. Pt. Was able to complete all activity this date without pain and shows improved wrist mobility overall since last session.    Progress strengthening /stabilization /functional activity           Timed:         Manual Therapy:    15     mins  01510;     Therapeutic Exercise:    15     mins  08566;     Therapeutic Activity:     10     mins  69851;     Ultrasound:     8     mins  02756;      Timed Treatment:   48   mins   Total Treatment:     48   mins    Alia Cortez PTA  Physical Therapist Assistant License #[id number]

## 2022-03-18 ENCOUNTER — TREATMENT (OUTPATIENT)
Dept: PHYSICAL THERAPY | Facility: CLINIC | Age: 37
End: 2022-03-18

## 2022-03-18 DIAGNOSIS — M77.11 LATERAL EPICONDYLITIS OF RIGHT ELBOW: Primary | ICD-10-CM

## 2022-03-18 DIAGNOSIS — M79.631 RIGHT FOREARM PAIN: ICD-10-CM

## 2022-03-18 DIAGNOSIS — M25.521 RIGHT ELBOW PAIN: ICD-10-CM

## 2022-03-18 PROCEDURE — 97035 APP MDLTY 1+ULTRASOUND EA 15: CPT | Performed by: PHYSICAL THERAPIST

## 2022-03-18 PROCEDURE — 97110 THERAPEUTIC EXERCISES: CPT | Performed by: PHYSICAL THERAPIST

## 2022-03-18 PROCEDURE — 97140 MANUAL THERAPY 1/> REGIONS: CPT | Performed by: PHYSICAL THERAPIST

## 2022-03-18 NOTE — PROGRESS NOTES
Physical Therapy Daily Progress Note      Patient: Sourav Tamez   : 1985  Diagnosis/ICD-10 Code:  Lateral epicondylitis of right elbow [M77.11]  Referring practitioner: Devan Davenport, *  Date of Initial Visit: Type: THERAPY  Noted: 2022  Today's Date: 3/18/2022  Patient seen for 6 sessions         Sourav Tamez reports: his pain has increased some these past few days due to moving furniture two days ago. Pt. Stated he needed to move furniture in his apartment because he is getting some windows replaced and he did not have any proper help moving the furniture. Pt. Stated his highest pain level within the past two days has been a 4/10.     Objective   See Exercise, Manual, and Modality Logs for complete treatment.       Assessment/Plan   Pt. Has tolerated exercises well today despite increased overall pain due to moving furniture a couple of days ago. Pt. Tolerated manual interventions well with wrist extensor tightness. After exercises, pt reported some tightness and soreness on proximal wrist extensor area, but felt good overall.    Plan Goals: STG:  - Pt to report no pulling at posterior hand when making a full fist in 3 weeks.  - Pt to report 25% improvement in ability to lift a coffee cup in 3 weeks.  - Pt to be independent with HEP in 3 weeks.  LTG:  - Improve Quick DASH to 20% or less impairment by discharge.  - Increase R elbow and wrist strength to 4+/5 or greater by discharge.  - Pt to hold objects in R hand with no difficulty by discharge.  - Max pain rating at 2-3/10 with activity by discharge.    Progress strengthening /stabilization /functional activity           Timed:         Manual Therapy:    15     mins  38740;     Therapeutic Exercise:    15     mins  43103;     Ultrasound:     15     mins  45169;      Timed Treatment:   45   mins   Total Treatment:     45   mins    Alia Cortez PTA  Physical Therapist Assistant License #[id number]

## 2022-04-01 ENCOUNTER — TREATMENT (OUTPATIENT)
Dept: PHYSICAL THERAPY | Facility: CLINIC | Age: 37
End: 2022-04-01

## 2022-04-01 DIAGNOSIS — M25.521 RIGHT ELBOW PAIN: ICD-10-CM

## 2022-04-01 DIAGNOSIS — M77.11 LATERAL EPICONDYLITIS OF RIGHT ELBOW: Primary | ICD-10-CM

## 2022-04-01 DIAGNOSIS — M79.631 RIGHT FOREARM PAIN: ICD-10-CM

## 2022-04-01 PROCEDURE — 97140 MANUAL THERAPY 1/> REGIONS: CPT | Performed by: PHYSICAL THERAPIST

## 2022-04-01 PROCEDURE — 97035 APP MDLTY 1+ULTRASOUND EA 15: CPT | Performed by: PHYSICAL THERAPIST

## 2022-04-01 PROCEDURE — 97110 THERAPEUTIC EXERCISES: CPT | Performed by: PHYSICAL THERAPIST

## 2022-04-01 NOTE — PROGRESS NOTES
Physical Therapy Daily Progress Note      Patient: Sourav Tamez   : 1985  Diagnosis/ICD-10 Code:  Lateral epicondylitis of right elbow [M77.11]   Problems Addressed this Visit        Musculoskeletal and Injuries    Lateral epicondylitis of right elbow - Primary      Other Visit Diagnoses     Right forearm pain        Right elbow pain          Diagnoses       Codes Comments    Lateral epicondylitis of right elbow    -  Primary ICD-10-CM: M77.11  ICD-9-CM: 726.32     Right forearm pain     ICD-10-CM: M79.631  ICD-9-CM: 729.5     Right elbow pain     ICD-10-CM: M25.521  ICD-9-CM: 719.42         Referring practitioner: Devan Davenport, *  Date of Initial Visit: Type: THERAPY  Noted: 2022  Today's Date: 2022    VISIT#: 7    Subjective : pt reports having pain about every other day. States having pain today and rates pain at 6/10 in R forearm. Pt states he has been doing a lot of moving furniture due to work being done at his home.  Quick DASH = 16% impairment    Objective : tenderness and tension noted R brachioradialis and wrist extensors.  R elbow flex and ext = 4+/5  R wrist ext = 4/5, flex = 4+/5  See Exercise, Manual, and Modality Logs for complete treatment.     Assessment/Plan :  STG:  - Pt to report no pulling at posterior hand when making a full fist in 3 weeks. - MET  - Pt to report 25% improvement in ability to lift a coffee cup in 3 weeks. - MET  - Pt to be independent with HEP in 3 weeks. - Progressing  LTG:  - Improve Quick DASH to 20% or less impairment by discharge. - MET  - Increase R elbow and wrist strength to 4+/5 or greater by discharge. - Progressing  - Pt to hold objects in R hand with no difficulty by discharge. - Progressing  - Max pain rating at 2-3/10 with activity by discharge. - Not met    Progress per Plan of Care and Progress strengthening /stabilization /functional activity         Timed:         Manual Therapy:    15     mins  31455;     Therapeutic Exercise:    15      mins  28496;     Neuromuscular Tabitha:        mins  21259;    Therapeutic Activity:          mins  00533;     Gait Training:           mins  76312;     Ultrasound:     10     mins  54080;    Ionto                                   mins   92125  Self Care                            mins   16310    Un-Timed:  Electrical Stimulation:         mins  44315 ( );  Dry Needling          mins 96740/17372  Traction          mins 18465  Canalith Repos                   mins  62477  Low Eval          Mins  47068  Mod Eval          Mins  63844  High Eval                            Mins  31232  Re-Eval                               mins  07351    Timed Treatment:   30   mins   Total Treatment:     40   mins    Zo Jim PT, CLT, Cert DN  Physical Therapist  [place] Lic # [id number]

## 2022-04-08 ENCOUNTER — TREATMENT (OUTPATIENT)
Dept: PHYSICAL THERAPY | Facility: CLINIC | Age: 37
End: 2022-04-08

## 2022-04-08 DIAGNOSIS — M77.11 LATERAL EPICONDYLITIS OF RIGHT ELBOW: Primary | ICD-10-CM

## 2022-04-08 DIAGNOSIS — M79.631 RIGHT FOREARM PAIN: ICD-10-CM

## 2022-04-08 DIAGNOSIS — M25.521 RIGHT ELBOW PAIN: ICD-10-CM

## 2022-04-08 PROCEDURE — 97035 APP MDLTY 1+ULTRASOUND EA 15: CPT | Performed by: PHYSICAL THERAPIST

## 2022-04-08 PROCEDURE — 97530 THERAPEUTIC ACTIVITIES: CPT | Performed by: PHYSICAL THERAPIST

## 2022-04-08 PROCEDURE — 97110 THERAPEUTIC EXERCISES: CPT | Performed by: PHYSICAL THERAPIST

## 2022-04-08 NOTE — PROGRESS NOTES
"     Physical Therapy Daily Progress Note      Patient: Sourav Tamez   : 1985  Diagnosis/ICD-10 Code:  No primary diagnosis found.  Referring practitioner: Devan Davenport, *  Date of Initial Visit: No linked episodes  Today's Date: 2022  Patient seen for Visit count could not be calculated. Make sure you are using a visit which is associated with an episode. sessions         Sourav Tamez reports: he has been feeling \"about the same\". Pt. Reports he has not been moving furniture lately anymore. Pt. Also states he does not get pain on R elbow while steven as frequently, but does get pain occasionally while steven for too long.     Objective   See Exercise, Manual, and Modality Logs for complete treatment.       Assessment/Plan   Pt. Tolerated manual interventions well this date with noted increased tightness in proximal wrist flexor and extensor musculature. Pt. Tolerated therapeutic exercisess well this treatment session and will be appropriate to progress wrist ext, flex, pron, and sup to 5 lbs next treatment session. Pt. Progressed roll wrist up/down exercise to 5 lbs this treatment session. After treatment session, Pt. Reports tightness and 5.5/10 pain this date.      STG:  - Pt to report no pulling at posterior hand when making a full fist in 3 weeks. - MET  - Pt to report 25% improvement in ability to lift a coffee cup in 3 weeks. - MET  - Pt to be independent with HEP in 3 weeks. - Progressing  LTG:  - Improve Quick DASH to 20% or less impairment by discharge. - MET  - Increase R elbow and wrist strength to 4+/5 or greater by discharge. - Progressing  - Pt to hold objects in R hand with no difficulty by discharge. - Progressing  - Max pain rating at 2-3/10 with activity by discharge. - Not met     Progress strengthening /stabilization /functional activity           Timed:                                                                           Manual Therapy:            15     mins  90597;     "              Therapeutic Exercise:    20     mins  98160;          Ultrasound:                     8     mins  93513;       Timed Treatment:   43  mins   Total Treatment:    43 mins    Alia Cortez PTA  Physical Therapist Assistant License #[id number]

## 2022-04-18 DIAGNOSIS — J45.20 MILD INTERMITTENT ASTHMA WITHOUT COMPLICATION: ICD-10-CM

## 2022-04-18 RX ORDER — DEXAMETHASONE 4 MG/1
TABLET ORAL
Qty: 12 G | Refills: 3 | OUTPATIENT
Start: 2022-04-18

## 2022-04-22 ENCOUNTER — TREATMENT (OUTPATIENT)
Dept: PHYSICAL THERAPY | Facility: CLINIC | Age: 37
End: 2022-04-22

## 2022-04-22 DIAGNOSIS — M25.521 RIGHT ELBOW PAIN: ICD-10-CM

## 2022-04-22 DIAGNOSIS — M79.631 RIGHT FOREARM PAIN: ICD-10-CM

## 2022-04-22 DIAGNOSIS — M77.11 LATERAL EPICONDYLITIS OF RIGHT ELBOW: Primary | ICD-10-CM

## 2022-04-22 PROCEDURE — 97140 MANUAL THERAPY 1/> REGIONS: CPT | Performed by: PHYSICAL THERAPIST

## 2022-04-22 PROCEDURE — 97110 THERAPEUTIC EXERCISES: CPT | Performed by: PHYSICAL THERAPIST

## 2022-04-22 NOTE — PROGRESS NOTES
Physical Therapy Daily Progress Note      Patient: Sourav Tamez   : 1985  Diagnosis/ICD-10 Code:  Lateral epicondylitis of right elbow [M77.11]  Referring practitioner: Devan Davenport, *  Date of Initial Visit: Type: THERAPY  Noted: 2022  Today's Date: 2022  Patient seen for 9 sessions         Sourav Tamez reports: to clinic this date at 10:30 am with a scheduled appointment at 10:15 and states he is experiencing increased pain this morning on his R elbow at the wrist ext musculature. Pt. States he was late to his treatment session this morning due to increased cramping pain in his R elbow and was having trouble donning his shoes this morning.    Objective   See Exercise, Manual, and Modality Logs for complete treatment.       Assessment/Plan   Deferred US application to R wrist and elbow musculature due to time limitations this date. Pt. Progressed therapeutic exercises this date to increased reps and resistance with good tolerance. Pt. Reported increased soreness in R wrist ext musculature towards the end of therapeutic exercises, especially with dowel rylie rolls. Applied MHP to R elbow s/p treatment session this date do decrease pain and soreness.     STG:  - Pt to report no pulling at posterior hand when making a full fist in 3 weeks. - MET  - Pt to report 25% improvement in ability to lift a coffee cup in 3 weeks. - MET  - Pt to be independent with HEP in 3 weeks. - Progressing  LTG:  - Improve Quick DASH to 20% or less impairment by discharge. - MET  - Increase R elbow and wrist strength to 4+/5 or greater by discharge. - Progressing  - Pt to hold objects in R hand with no difficulty by discharge. - Progressing  - Max pain rating at 2-3/10 with activity by discharge. - Not met    Progress strengthening /stabilization /functional activity           Timed:         Manual Therapy:    15     mins  26905;     Therapeutic Exercise:    20     mins  12686;       Timed Treatment:   35   mins    Total Treatment:     50   mins    Alia Cortez PTA  Physical Therapist Assistant License #[id number]

## 2022-05-13 ENCOUNTER — OFFICE VISIT (OUTPATIENT)
Dept: FAMILY MEDICINE CLINIC | Facility: CLINIC | Age: 37
End: 2022-05-13

## 2022-05-13 VITALS
HEART RATE: 81 BPM | OXYGEN SATURATION: 97 % | DIASTOLIC BLOOD PRESSURE: 82 MMHG | BODY MASS INDEX: 28.44 KG/M2 | HEIGHT: 67 IN | SYSTOLIC BLOOD PRESSURE: 124 MMHG | RESPIRATION RATE: 18 BRPM | WEIGHT: 181.2 LBS | TEMPERATURE: 97.8 F

## 2022-05-13 DIAGNOSIS — H91.93 BILATERAL HEARING LOSS, UNSPECIFIED HEARING LOSS TYPE: Primary | ICD-10-CM

## 2022-05-13 PROBLEM — H61.23 HEARING LOSS DUE TO CERUMEN IMPACTION, BILATERAL: Status: ACTIVE | Noted: 2022-05-13

## 2022-05-13 PROBLEM — H61.23 HEARING LOSS DUE TO CERUMEN IMPACTION, BILATERAL: Status: RESOLVED | Noted: 2022-05-13 | Resolved: 2022-05-13

## 2022-05-13 PROCEDURE — 99212 OFFICE O/P EST SF 10 MIN: CPT | Performed by: FAMILY MEDICINE

## 2022-05-13 RX ORDER — DEXAMETHASONE 4 MG/1
1 TABLET ORAL 2 TIMES DAILY
COMMUNITY
Start: 2022-02-18 | End: 2022-06-09 | Stop reason: SDUPTHER

## 2022-05-13 NOTE — PROGRESS NOTES
Subjective   Sourav Tamez is a 37 y.o. male. Presents to [facility]    Chief Complaint   Patient presents with   • Hearing Loss       Hearing Problem  This is a recurrent problem. The current episode started more than 1 month ago. The problem occurs intermittently. The problem has been unchanged. Pertinent negatives include no abdominal pain, anorexia, arthralgias, change in bowel habit, chest pain, chills, congestion, coughing, diaphoresis, fatigue, fever, headaches, joint swelling, myalgias, nausea, neck pain, numbness, rash, sore throat, swollen glands, urinary symptoms, vertigo, visual change, vomiting or weakness. Associated symptoms comments: Patient states that he feels his hearing is not the same as what it used to be. He states that he feels that when he has his head phones on he says the noise is very soft and he states that there are times it feels as if they are clogged and he states that this goes away. He states that he feels certain sounds are softer than others as well. . Nothing aggravates the symptoms. He has tried nothing for the symptoms. The treatment provided no relief.        I personally reviewed and updated the patient's allergies, medications, problem list, and past medical, surgical, social, and family history. I have reviewed and confirmed the accuracy of the History of Present Illness and Review of Symptoms as documented by the MA/LPN/RN. Judy Bassett MD    Allergies:  Allergies   Allergen Reactions   • Amoxicillin Swelling and Shortness Of Breath   • Cetirizine Swelling   • Diphenhydramine Swelling   • Doxycycline Swelling   • Escitalopram Swelling     Swelling of throat   • Fexofenadine Anaphylaxis   • Loratadine Swelling   • Montelukast Swelling   • Penicillins Shortness Of Breath   • Prednisone Swelling   • Trazodone Swelling   • Codeine Myalgia   • Latex Rash   • Mucinex [Guaifenesin Er] Cough   • Olanzapine Rash   • Robitussin (Alcohol Free) [Guaifenesin]  Cough       Social History:  Social History     Socioeconomic History   • Marital status:    Tobacco Use   • Smoking status: Never Smoker   • Smokeless tobacco: Never Used   Vaping Use   • Vaping Use: Never used   Substance and Sexual Activity   • Alcohol use: Yes   • Drug use: No   • Sexual activity: Defer       Family History:  Family History   Problem Relation Age of Onset   • Hypertension Mother    • Hyperlipidemia Mother    • Hyperlipidemia Maternal Grandmother        Past Medical History :  Patient Active Problem List   Diagnosis   • Perennial allergic rhinitis   • ADD (attention deficit disorder)   • Asperger's syndrome   • Mixed hyperlipidemia   • Sliding hiatal hernia   • Anxiety   • Depression, major, recurrent, mild (HCC)   • Gastroesophageal reflux disease without esophagitis   • Hypothyroidism (acquired)   • Mild intermittent asthma without complication   • Labile mood   • Encounter for routine adult physical exam with abnormal findings   • Chronic idiopathic constipation   • Overweight   • Pain of fifth toe   • Lateral epicondylitis of right elbow   • Left leg pain   • Bilateral hearing loss       Medication List:    Current Outpatient Medications:   •  ALBUTEROL SULFATE  (90 Base) MCG/ACT inhaler, INHALE 2 PUFFS BY MOUTH EVERY 4 TO 6 HOURS AS NEEDED. MAX 12 PUFFS DAILY., Disp: 18 g, Rfl: 3  •  budesonide-formoterol (Symbicort) 80-4.5 MCG/ACT inhaler, Inhale 2 puffs 2 (Two) Times a Day., Disp: 6.9 g, Rfl: 12  •  busPIRone (BUSPAR) 15 MG tablet, Take 15 mg by mouth 2 (two) times a day., Disp: , Rfl:   •  Flovent  MCG/ACT inhaler, Inhale 1 puff 2 (Two) Times a Day., Disp: , Rfl:   •  ibuprofen (ADVIL,MOTRIN) 800 MG tablet, Take 1 tablet by mouth Every 8 (Eight) Hours As Needed for Mild Pain  or Moderate Pain ., Disp: 90 tablet, Rfl: 0  •  lamoTRIgine (LaMICtal) 200 MG tablet, Take 1 tablet by mouth Daily., Disp: 30 tablet, Rfl: 12  •  metoprolol succinate XL (TOPROL-XL) 25 MG 24 hr  "tablet, TAKE 1 TABLET BY MOUTH DAILY, Disp: 90 tablet, Rfl: 3  •  multivitamin with minerals tablet tablet, Take 1 tablet by mouth Daily., Disp: , Rfl:   •  omeprazole (priLOSEC) 40 MG capsule, TAKE 1 CAPSULE BY MOUTH DAILY, Disp: 90 capsule, Rfl: 0  •  polyethylene glycol (MIRALAX) 17 GM/SCOOP powder, , Disp: , Rfl:   •  pravastatin (PRAVACHOL) 10 MG tablet, Take 1 tablet by mouth Daily., Disp: 30 tablet, Rfl: 12  •  Triamcinolone Acetonide (NASACORT) 55 MCG/ACT nasal inhaler, 2 sprays into the nostril(s) as directed by provider Daily., Disp: , Rfl:     Past Surgical History:  Past Surgical History:   Procedure Laterality Date   • TONSILLECTOMY  1988       Review of Systems:  Review of Systems   Constitutional: Negative for chills, diaphoresis, fatigue and fever.   HENT: Negative for congestion, sore throat and swollen glands.    Respiratory: Negative for cough.    Cardiovascular: Negative for chest pain.   Gastrointestinal: Negative for abdominal pain, anorexia, change in bowel habit, nausea and vomiting.   Musculoskeletal: Negative for arthralgias, joint swelling, myalgias and neck pain.   Skin: Negative for rash.   Neurological: Negative for vertigo, weakness and numbness.       Physical Exam:  Vital Signs:  Vital Signs:   /82   Pulse 81   Temp 97.8 °F (36.6 °C)   Resp 18   Ht 170.2 cm (67\")   Wt 82.2 kg (181 lb 3.2 oz)   SpO2 97%   BMI 28.38 kg/m²     Result Review :                Physical Exam  Vitals reviewed.   Constitutional:       Appearance: Normal appearance. He is well-developed.   HENT:      Head: Normocephalic and atraumatic.      Right Ear: Tympanic membrane and external ear normal. There is no impacted cerumen.      Left Ear: Tympanic membrane and external ear normal. There is no impacted cerumen.   Eyes:      General:         Right eye: No discharge.         Left eye: No discharge.   Cardiovascular:      Rate and Rhythm: Normal rate and regular rhythm.      Heart sounds: Normal heart " sounds. No murmur heard.    No friction rub. No gallop.   Pulmonary:      Effort: Pulmonary effort is normal. No respiratory distress.      Breath sounds: Normal breath sounds. No wheezing or rales.   Skin:     General: Skin is warm and dry.      Findings: No rash.   Neurological:      Mental Status: He is alert and oriented to person, place, and time.      Coordination: Coordination normal.      Gait: Gait normal.   Psychiatric:         Behavior: Behavior is cooperative.         Assessment and Plan:  Problems Addressed this Visit        ENT    Bilateral hearing loss - Primary     Uncertain cause. Will have him see audiology. Phone number given. Await report             Diagnoses       Codes Comments    Bilateral hearing loss, unspecified hearing loss type    -  Primary ICD-10-CM: H91.93  ICD-9-CM: 389.9            BMI is >= 25 and < 30. (Overweight) The following options were offered after discussion: exercise counseling/recommendations and nutrition counseling/recommendations      An After Visit Summary and PPPS were given to the patient.       I wore protective equipment throughout this patient encounter to include mask. Hand hygiene was performed before donning protective equipment and after removal when leaving the room.

## 2022-05-16 ENCOUNTER — TREATMENT (OUTPATIENT)
Dept: PHYSICAL THERAPY | Facility: CLINIC | Age: 37
End: 2022-05-16

## 2022-05-16 DIAGNOSIS — M79.631 RIGHT FOREARM PAIN: ICD-10-CM

## 2022-05-16 DIAGNOSIS — M77.11 LATERAL EPICONDYLITIS OF RIGHT ELBOW: Primary | ICD-10-CM

## 2022-05-16 DIAGNOSIS — M25.521 RIGHT ELBOW PAIN: ICD-10-CM

## 2022-05-16 PROCEDURE — 97110 THERAPEUTIC EXERCISES: CPT | Performed by: PHYSICAL THERAPIST

## 2022-05-16 PROCEDURE — 97140 MANUAL THERAPY 1/> REGIONS: CPT | Performed by: PHYSICAL THERAPIST

## 2022-05-16 NOTE — PROGRESS NOTES
Physical Therapy Daily Progress Note      Patient: Sourav Tamez   : 1985  Diagnosis/ICD-10 Code:  Lateral epicondylitis of right elbow [M77.11]  Referring practitioner: Devan Davenport, *  Date of Initial Visit: Type: THERAPY  Noted: 2022  Today's Date: 2022  Patient seen for 10 sessions         Sourav Tamez reports: today his R forearm is flared up due to binge playing video games over the weekend and states it has still been intermittently sore other times as well. Pt. States he would rate the pain in his proximal forearm at 5-6/10 this date.    QuickDASH = 36%    Objective   See Exercise, Manual, and Modality Logs for complete treatment.     Assessment/Plan   Pt. Continues to present with muscular tightness throughout forearm however no palpable swelling or trigger points were noted this date. Pt. Completes all exercises well however did report increased fatigue and soreness with roll up exercise this date. Pt. Continues to benefit from strengthening and improvement is significant in day to day life but still needs improvement to address endurance for long stints of steven.    STG:  - Pt to report no pulling at posterior hand when making a full fist in 3 weeks. - MET  - Pt to report 25% improvement in ability to lift a coffee cup in 3 weeks. - MET  - Pt to be independent with HEP in 3 weeks. - MET  LTG:  - Improve Quick DASH to 20% or less impairment by discharge. - MET  - Increase R elbow and wrist strength to 4+/5 or greater by discharge. - Progressing  - Pt to hold objects in R hand with no difficulty by discharge. - Progressing  - Max pain rating at 2-3/10 with activity by discharge. - Not met    Progress strengthening /stabilization /functional activity           Timed:         Manual Therapy:    15     mins  85173;     Therapeutic Exercise:    25     mins  [id number];         Timed Treatment:  40    mins   Total Treatment:     50   mins    Alia Cortez PTA  Physical Therapist  Assistant License #[id number]

## 2022-06-09 ENCOUNTER — TELEPHONE (OUTPATIENT)
Dept: FAMILY MEDICINE CLINIC | Facility: CLINIC | Age: 37
End: 2022-06-09

## 2022-06-09 DIAGNOSIS — J45.20 MILD INTERMITTENT ASTHMA WITHOUT COMPLICATION: Primary | ICD-10-CM

## 2022-06-09 RX ORDER — BUDESONIDE AND FORMOTEROL FUMARATE DIHYDRATE 80; 4.5 UG/1; UG/1
2 AEROSOL RESPIRATORY (INHALATION) 2 TIMES DAILY
Qty: 6.9 G | Refills: 12 | Status: SHIPPED | OUTPATIENT
Start: 2022-06-09 | End: 2023-01-04 | Stop reason: SDUPTHER

## 2022-06-20 ENCOUNTER — TELEPHONE (OUTPATIENT)
Dept: FAMILY MEDICINE CLINIC | Facility: CLINIC | Age: 37
End: 2022-06-20

## 2022-06-20 DIAGNOSIS — E78.2 MIXED HYPERLIPIDEMIA: Primary | ICD-10-CM

## 2022-06-20 DIAGNOSIS — E03.9 HYPOTHYROIDISM (ACQUIRED): ICD-10-CM

## 2022-06-20 NOTE — TELEPHONE ENCOUNTER
----- Message from Sourav Tamez sent at 6/19/2022  9:21 PM EDT -----  Regarding: Blood work  Hey just a heads up I’m seeing dr palomares this Friday 6/24/22 for my cholesterol bloodwork results check up and reminding you to please send to the lab my order for getting blood work done this this week before Wednesday if possible dr palomares wants me to get blood drawn 2 days before my appointment thanks for taking the time to read this one you have great day Sourav dumont

## 2022-06-23 LAB
ALBUMIN SERPL-MCNC: 4.8 G/DL (ref 4–5)
ALBUMIN/GLOB SERPL: 2.8 {RATIO} (ref 1.2–2.2)
ALP SERPL-CCNC: 70 IU/L (ref 44–121)
ALT SERPL-CCNC: 46 IU/L (ref 0–44)
AST SERPL-CCNC: 30 IU/L (ref 0–40)
BILIRUB SERPL-MCNC: 0.4 MG/DL (ref 0–1.2)
BUN SERPL-MCNC: 11 MG/DL (ref 6–20)
BUN/CREAT SERPL: 12 (ref 9–20)
CALCIUM SERPL-MCNC: 8.9 MG/DL (ref 8.7–10.2)
CHLORIDE SERPL-SCNC: 103 MMOL/L (ref 96–106)
CHOLEST SERPL-MCNC: 167 MG/DL (ref 100–199)
CHOLEST/HDLC SERPL: 5.2 RATIO (ref 0–5)
CO2 SERPL-SCNC: 24 MMOL/L (ref 20–29)
CREAT SERPL-MCNC: 0.94 MG/DL (ref 0.76–1.27)
EGFRCR SERPLBLD CKD-EPI 2021: 106 ML/MIN/1.73
GLOBULIN SER CALC-MCNC: 1.7 G/DL (ref 1.5–4.5)
GLUCOSE SERPL-MCNC: 98 MG/DL (ref 65–99)
HDLC SERPL-MCNC: 32 MG/DL
LDLC SERPL CALC-MCNC: 109 MG/DL (ref 0–99)
POTASSIUM SERPL-SCNC: 4 MMOL/L (ref 3.5–5.2)
PROT SERPL-MCNC: 6.5 G/DL (ref 6–8.5)
SODIUM SERPL-SCNC: 141 MMOL/L (ref 134–144)
TRIGL SERPL-MCNC: 143 MG/DL (ref 0–149)
TSH SERPL DL<=0.005 MIU/L-ACNC: 5.9 UIU/ML (ref 0.45–4.5)
VLDLC SERPL CALC-MCNC: 26 MG/DL (ref 5–40)

## 2022-06-23 NOTE — PROGRESS NOTES
Subjective   Sourav Tamez is a 37 y.o. male. Presents to [facility]    Chief Complaint   Patient presents with   • Hyperlipidemia   • Rash       Hyperlipidemia  This is a chronic problem. The current episode started more than 1 year ago. The problem is uncontrolled. Recent lipid tests were reviewed and are variable. Exacerbating diseases include hypothyroidism. He has no history of diabetes or obesity. Pertinent negatives include no chest pain or shortness of breath. Current antihyperlipidemic treatment includes statins. The current treatment provides moderate improvement of lipids. Risk factors for coronary artery disease include dyslipidemia and male sex.   Rash  This is a recurrent problem. The current episode started in the past 7 days. The problem is unchanged. The affected locations include the groin. The rash is characterized by itchiness. He was exposed to nothing. Pertinent negatives include no anorexia, congestion, cough, diarrhea, eye pain, facial edema, fatigue, fever, joint pain, nail changes, rhinorrhea, shortness of breath, sore throat or vomiting. Past treatments include anti-itch cream. The treatment provided mild relief.        I personally reviewed and updated the patient's allergies, medications, problem list, and past medical, surgical, social, and family history. I have reviewed and confirmed the accuracy of the History of Present Illness and Review of Symptoms as documented by the MA/LPN/RN. Judy Bassett MD    Allergies:  Allergies   Allergen Reactions   • Amoxicillin Swelling and Shortness Of Breath   • Cetirizine Swelling   • Diphenhydramine Swelling   • Doxycycline Swelling   • Escitalopram Swelling     Swelling of throat   • Fexofenadine Anaphylaxis   • Loratadine Swelling   • Montelukast Swelling   • Penicillins Shortness Of Breath   • Prednisone Swelling   • Trazodone Swelling   • Codeine Myalgia   • Latex Rash   • Mucinex [Guaifenesin Er] Cough   • Olanzapine Rash    • Robitussin (Alcohol Free) [Guaifenesin] Cough       Social History:  Social History     Socioeconomic History   • Marital status:    Tobacco Use   • Smoking status: Never Smoker   • Smokeless tobacco: Never Used   Vaping Use   • Vaping Use: Never used   Substance and Sexual Activity   • Alcohol use: Yes   • Drug use: No   • Sexual activity: Defer       Family History:  Family History   Problem Relation Age of Onset   • Hypertension Mother    • Hyperlipidemia Mother    • Hyperlipidemia Maternal Grandmother        Past Medical History :  Patient Active Problem List   Diagnosis   • Perennial allergic rhinitis   • ADD (attention deficit disorder)   • Asperger's syndrome   • Mixed hyperlipidemia   • Sliding hiatal hernia   • Anxiety   • Depression, major, recurrent, mild (HCC)   • Gastroesophageal reflux disease without esophagitis   • Hypothyroidism (acquired)   • Mild intermittent asthma without complication   • Labile mood   • Encounter for routine adult physical exam with abnormal findings   • Chronic idiopathic constipation   • Overweight   • Pain of fifth toe   • Lateral epicondylitis of right elbow   • Left leg pain   • Bilateral hearing loss   • Tinea cruris       Medication List:    Current Outpatient Medications:   •  ALBUTEROL SULFATE  (90 Base) MCG/ACT inhaler, INHALE 2 PUFFS BY MOUTH EVERY 4 TO 6 HOURS AS NEEDED. MAX 12 PUFFS DAILY., Disp: 18 g, Rfl: 3  •  budesonide-formoterol (Symbicort) 80-4.5 MCG/ACT inhaler, Inhale 2 puffs 2 (Two) Times a Day., Disp: 6.9 g, Rfl: 12  •  busPIRone (BUSPAR) 15 MG tablet, Take 15 mg by mouth 2 (two) times a day., Disp: , Rfl:   •  ibuprofen (ADVIL,MOTRIN) 800 MG tablet, Take 1 tablet by mouth Every 8 (Eight) Hours As Needed for Mild Pain  or Moderate Pain ., Disp: 90 tablet, Rfl: 0  •  lamoTRIgine (LaMICtal) 200 MG tablet, Take 1 tablet by mouth Daily., Disp: 30 tablet, Rfl: 12  •  metoprolol succinate XL (TOPROL-XL) 25 MG 24 hr tablet, TAKE 1 TABLET BY  "MOUTH DAILY, Disp: 90 tablet, Rfl: 3  •  multivitamin with minerals tablet tablet, Take 1 tablet by mouth Daily., Disp: , Rfl:   •  omeprazole (priLOSEC) 40 MG capsule, TAKE 1 CAPSULE BY MOUTH DAILY, Disp: 90 capsule, Rfl: 0  •  polyethylene glycol (MIRALAX) 17 GM/SCOOP powder, , Disp: , Rfl:   •  pravastatin (PRAVACHOL) 10 MG tablet, Take 1 tablet by mouth Daily., Disp: 30 tablet, Rfl: 12  •  Triamcinolone Acetonide (NASACORT) 55 MCG/ACT nasal inhaler, 2 sprays into the nostril(s) as directed by provider Daily., Disp: , Rfl:   •  levothyroxine (SYNTHROID, LEVOTHROID) 25 MCG tablet, Take 1 tablet by mouth Daily., Disp: 30 tablet, Rfl: 12  •  nystatin (MYCOSTATIN) [id number] UNIT/GM cream, Apply 1 application topically to the appropriate area as directed 2 (Two) Times a Day., Disp: 60 g, Rfl: 2    Past Surgical History:  Past Surgical History:   Procedure Laterality Date   • TONSILLECTOMY  1988       Review of Systems:  Review of Systems   Constitutional: Negative for fatigue and fever.   HENT: Negative for congestion, rhinorrhea and sore throat.    Eyes: Negative for pain.   Respiratory: Negative for cough and shortness of breath.    Cardiovascular: Negative for chest pain.   Gastrointestinal: Negative for anorexia, diarrhea and vomiting.   Musculoskeletal: Negative for joint pain.   Skin: Positive for rash. Negative for nail changes.       Physical Exam:  Vital Signs:  Vital Signs:   /80 (BP Location: Right arm, Patient Position: Sitting, Cuff Size: Adult)   Pulse 84   Temp 97.7 °F (36.5 °C) (Temporal)   Resp 18   Ht 167.6 cm (66\")   Wt 80.3 kg (177 lb)   SpO2 98% Comment: room air  BMI 28.57 kg/m²     Result Review :   The following data was reviewed by: Judy Bassett MD on 06/24/2022:  CMP    CMP 10/22/21 2/22/22 6/22/22   Glucose 96 99 98   BUN 10 13 11   Creatinine 0.96 0.95 0.94   eGFR Non African Am 100 100    eGFR African Am 115 116    Sodium 143 143 141   Potassium 4.4 4.5 4.0   Chloride 104 103 " 103   Calcium 9.1 9.4 8.9   Total Protein 6.6 7.0 6.5   Albumin 4.4 4.8 4.8   Globulin 2.2 2.2 1.7   Total Bilirubin 0.4 0.3 0.4   Alkaline Phosphatase 68 68 70   AST (SGOT) 22 25 30   ALT (SGPT) 48 (A) 56 (A) 46 (A)   (A) Abnormal value       Comments are available for some flowsheets but are not being displayed.           CBC w/diff    CBC w/Diff 10/22/21   WBC 6.7   RBC 5.46   Hemoglobin 16.1   Hematocrit 48.3   MCV 89   MCH 29.5   MCHC 33.3   RDW 12.5   Platelets 196   Neutrophil Rel % 67   Lymphocyte Rel % 24   Monocyte Rel % 7   Eosinophil Rel % 2   Basophil Rel % 0           Lipid Panel    Lipid Panel 10/22/21 2/22/22 6/22/22   Total Cholesterol 212 (A) 190 167   Triglycerides 102 121 143   HDL Cholesterol 37 (A) 42 32 (A)   VLDL Cholesterol 19 22 26   LDL Cholesterol  156 (A) 126 (A) 109 (A)   (A) Abnormal value            TSH    TSH 10/22/21 6/22/22   TSH 4.540 (A) 5.900 (A)   (A) Abnormal value                     Physical Exam  Vitals reviewed.   Constitutional:       Appearance: Normal appearance. He is well-developed.   HENT:      Head: Normocephalic and atraumatic.   Eyes:      General:         Right eye: No discharge.         Left eye: No discharge.   Cardiovascular:      Rate and Rhythm: Normal rate and regular rhythm.      Heart sounds: Normal heart sounds. No murmur heard.    No friction rub. No gallop.   Pulmonary:      Effort: Pulmonary effort is normal. No respiratory distress.      Breath sounds: Normal breath sounds. No wheezing or rales.   Skin:     General: Skin is warm and dry.      Findings: Rash present.          Neurological:      Mental Status: He is alert and oriented to person, place, and time.      Coordination: Coordination normal.      Gait: Gait normal.   Psychiatric:         Behavior: Behavior is cooperative.         Assessment and Plan:  Problems Addressed this Visit        Cardiac and Vasculature    Mixed hyperlipidemia - Primary (Chronic)     Lipid abnormalities are improving  with treatment.  continue current treatment  Lipids will be reassessed in 3 months.              Endocrine and Metabolic    Hypothyroidism (acquired) (Chronic)     Worse  Change medication dose           Relevant Medications    levothyroxine (SYNTHROID, LEVOTHROID) 25 MCG tablet    Overweight       Skin    Tinea cruris     Diagnosis, treatment and and course discussed. Potential side effects discussed. Return if there is worsening or persistence of symptoms.                Diagnoses       Codes Comments    Mixed hyperlipidemia    -  Primary ICD-10-CM: E78.2  ICD-9-CM: 272.2     Overweight     ICD-10-CM: E66.3  ICD-9-CM: 278.02     Tinea cruris     ICD-10-CM: B35.6  ICD-9-CM: 110.3     Hypothyroidism (acquired)     ICD-10-CM: E03.9  ICD-9-CM: 244.9            BMI is >= 25 and <30. (Overweight) The following options were offered after discussion;: weight loss educational material (shared in after visit summary), exercise counseling/recommendations and nutrition counseling/recommendations      An After Visit Summary and PPPS were given to the patient.       I wore protective equipment throughout this patient encounter to include mask and eyewear. Hand hygiene was performed before donning protective equipment and after removal when leaving the room.

## 2022-06-24 ENCOUNTER — OFFICE VISIT (OUTPATIENT)
Dept: FAMILY MEDICINE CLINIC | Facility: CLINIC | Age: 37
End: 2022-06-24

## 2022-06-24 ENCOUNTER — TELEPHONE (OUTPATIENT)
Dept: FAMILY MEDICINE CLINIC | Facility: CLINIC | Age: 37
End: 2022-06-24

## 2022-06-24 VITALS
BODY MASS INDEX: 28.45 KG/M2 | WEIGHT: 177 LBS | HEIGHT: 66 IN | OXYGEN SATURATION: 98 % | RESPIRATION RATE: 18 BRPM | SYSTOLIC BLOOD PRESSURE: 106 MMHG | TEMPERATURE: 97.7 F | DIASTOLIC BLOOD PRESSURE: 80 MMHG | HEART RATE: 84 BPM

## 2022-06-24 DIAGNOSIS — B35.6 TINEA CRURIS: ICD-10-CM

## 2022-06-24 DIAGNOSIS — E03.9 HYPOTHYROIDISM (ACQUIRED): Chronic | ICD-10-CM

## 2022-06-24 DIAGNOSIS — E78.2 MIXED HYPERLIPIDEMIA: Primary | Chronic | ICD-10-CM

## 2022-06-24 DIAGNOSIS — E66.3 OVERWEIGHT: ICD-10-CM

## 2022-06-24 PROCEDURE — 99213 OFFICE O/P EST LOW 20 MIN: CPT | Performed by: FAMILY MEDICINE

## 2022-06-24 RX ORDER — LEVOTHYROXINE SODIUM 0.03 MG/1
25 TABLET ORAL DAILY
Qty: 30 TABLET | Refills: 12 | Status: SHIPPED | OUTPATIENT
Start: 2022-06-24 | End: 2022-08-12

## 2022-06-24 RX ORDER — NYSTATIN 100000 U/G
1 CREAM TOPICAL 2 TIMES DAILY
Qty: 60 G | Refills: 2 | Status: SHIPPED | OUTPATIENT
Start: 2022-06-24 | End: 2022-06-24 | Stop reason: SDUPTHER

## 2022-06-24 RX ORDER — NYSTATIN 100000 U/G
1 CREAM TOPICAL 2 TIMES DAILY
Qty: 60 G | Refills: 2 | Status: SHIPPED | OUTPATIENT
Start: 2022-06-24 | End: 2022-08-12

## 2022-06-24 NOTE — TELEPHONE ENCOUNTER
Caller: [id number] - [place], [place] - [address] AT [facility] [address] - [phone] [place] [phone] FX    Relationship: Pharmacy    Best call back number: [phone]    Requested Prescriptions:   Requested Prescriptions     Pending Prescriptions Disp Refills   • nystatin (MYCOSTATIN) [id number] UNIT/GM cream 60 g 2     Sig: Apply 1 application topically to the appropriate area as directed 2 (Two) Times a Day.        Pharmacy where request should be sent: [id number] - [place], [place] - [address] AT [facility] [address] - [phone] [place] [phone] FX     Additional details provided by patient: PHARMACY STATES THE PATIENT FIRST DECLINED TO GET THE PRESCRIPTION DUE TO INSURANCE NOT COVERING IT, BUT PATIENT HAS CHANGED HIS MIND SO THE PHARMACY IS REQUESTING THE RX BE SENT AGAIN.       Ehsan Snyder Rep   06/24/22 12:19 EDT

## 2022-06-24 NOTE — TELEPHONE ENCOUNTER
PATIENT CALLED TO LET YOU KNOW THAT THE LEVOTHYROXINE NEEDS PRIOR AUTHORIZATION   BEFORE PHARMACY CAN FILL IT       [id number] - [place] [place] [address] [address] [address] AT [facility] [address]  [address]  - [phone]  - [phone]   [phone]    PLEASE ADVISE

## 2022-06-27 ENCOUNTER — OFFICE VISIT (OUTPATIENT)
Dept: PODIATRY | Facility: CLINIC | Age: 37
End: 2022-06-27

## 2022-06-27 VITALS
SYSTOLIC BLOOD PRESSURE: 110 MMHG | BODY MASS INDEX: 28.45 KG/M2 | WEIGHT: 177 LBS | HEIGHT: 66 IN | HEART RATE: 74 BPM | DIASTOLIC BLOOD PRESSURE: 76 MMHG

## 2022-06-27 DIAGNOSIS — M77.41 METATARSALGIA, RIGHT FOOT: ICD-10-CM

## 2022-06-27 DIAGNOSIS — L60.0 ONYCHOCRYPTOSIS: ICD-10-CM

## 2022-06-27 DIAGNOSIS — M79.671 CHRONIC FOOT PAIN, RIGHT: Primary | ICD-10-CM

## 2022-06-27 DIAGNOSIS — M24.571 EQUINUS CONTRACTURE OF RIGHT ANKLE: ICD-10-CM

## 2022-06-27 DIAGNOSIS — G89.29 CHRONIC FOOT PAIN, RIGHT: Primary | ICD-10-CM

## 2022-06-27 PROCEDURE — 99212 OFFICE O/P EST SF 10 MIN: CPT | Performed by: PODIATRIST

## 2022-06-27 NOTE — PROGRESS NOTES
06/27/2022  Foot and Ankle Surgery - Established Patient/Follow-up  Provider: Dr. Sanchez Gomez DPM  Location: [facility]    Subjective:  Sourav Tamez is a 37 y.o. male.     Chief Complaint   Patient presents with   • Left Foot - Ingrown Toenail   • Follow-up     Last Visit PCP FABIÁN Bassett 05/24/2022       HPI:     The patient is a 37-year-old male who returns for right foot pain. He was last seen in 04/2022.    He reports overall improvement to his right foot with the inserts.     Additionally, the patient states that he believes that he has an ingrown toenail on his left great toe. He reports that he hit it on a coffee table and it was painful. He notes that he has had an ingrown toenail in the past.     Allergies   Allergen Reactions   • Amoxicillin Swelling and Shortness Of Breath   • Cetirizine Swelling   • Diphenhydramine Swelling   • Doxycycline Swelling   • Escitalopram Swelling     Swelling of throat   • Fexofenadine Anaphylaxis   • Loratadine Swelling   • Montelukast Swelling   • Penicillins Shortness Of Breath   • Prednisone Swelling   • Trazodone Swelling   • Codeine Myalgia   • Latex Rash   • Mucinex [Guaifenesin Er] Cough   • Olanzapine Rash   • Robitussin (Alcohol Free) [Guaifenesin] Cough       Current Outpatient Medications on File Prior to Visit   Medication Sig Dispense Refill   • ALBUTEROL SULFATE  (90 Base) MCG/ACT inhaler INHALE 2 PUFFS BY MOUTH EVERY 4 TO 6 HOURS AS NEEDED. MAX 12 PUFFS DAILY. 18 g 3   • budesonide-formoterol (Symbicort) 80-4.5 MCG/ACT inhaler Inhale 2 puffs 2 (Two) Times a Day. 6.9 g 12   • busPIRone (BUSPAR) 15 MG tablet Take 15 mg by mouth 2 (two) times a day.     • ibuprofen (ADVIL,MOTRIN) 800 MG tablet Take 1 tablet by mouth Every 8 (Eight) Hours As Needed for Mild Pain  or Moderate Pain . 90 tablet 0   • lamoTRIgine (LaMICtal) 200 MG tablet Take 1 tablet by mouth Daily. 30 tablet 12   • levothyroxine (SYNTHROID, LEVOTHROID) 25 MCG tablet Take 1 tablet by  "mouth Daily. 30 tablet 12   • metoprolol succinate XL (TOPROL-XL) 25 MG 24 hr tablet TAKE 1 TABLET BY MOUTH DAILY 90 tablet 3   • multivitamin with minerals tablet tablet Take 1 tablet by mouth Daily.     • nystatin (MYCOSTATIN) 464928 UNIT/GM cream Apply 1 application topically to the appropriate area as directed 2 (Two) Times a Day. 60 g 2   • omeprazole (priLOSEC) 40 MG capsule TAKE 1 CAPSULE BY MOUTH DAILY 90 capsule 0   • polyethylene glycol (MIRALAX) 17 GM/SCOOP powder      • pravastatin (PRAVACHOL) 10 MG tablet Take 1 tablet by mouth Daily. 30 tablet 12   • Triamcinolone Acetonide (NASACORT) 55 MCG/ACT nasal inhaler 2 sprays into the nostril(s) as directed by provider Daily.       No current facility-administered medications on file prior to visit.       Objective   /76   Pulse 74   Ht 167.6 cm (66\")   Wt 80.3 kg (177 lb)   BMI 28.57 kg/m²     Foot/Ankle Exam:       General:   Appearance: appears stated age and healthy    Orientation: AAOx3    Affect: appropriate    Gait: unimpaired      VASCULAR      Right Foot Vascularity   Normal vascular exam    Dorsalis pedis:  2+  Posterior tibial:  2+  Skin Temperature: warm    Edema Grading:  None  CFT:  < 3 seconds  Pedal Hair Growth:  Present  Varicosities: none        NEUROLOGIC     Right Foot Neurologic   Light touch sensation:  Normal  Hot/Cold sensation: normal    Achilles reflex:  2+     MUSCULOSKELETAL      Right Foot Musculoskeletal   Ecchymosis:  None  Tenderness: 5th MTPJ    Arch:  Normal     MUSCLE STRENGTH     Right Foot Muscle Strength   Normal strength    Foot dorsiflexion:  5  Foot plantar flexion:  5  Foot inversion:  5  Foot eversion:  5     DERMATOLOGIC     Right Foot Dermatologic   Skin: skin intact       TESTS     Right Foot Tests   Anterior drawer: negative    Varus tilt: negative        Right Foot Additional Comments 11/18/2020  Mild discomfort involving the lateral, the foot and the fifth metatarsophalangeal joint.  No gross deformity " or instability.  No signs of inflammation.      Left Foot Additional Comments: 06/27/2022  No significant pain with palpation involving the left great toe. No deformity or instability.      Assessment & Plan   Diagnoses and all orders for this visit:    1. Chronic foot pain, right (Primary)    2. Metatarsalgia, right foot    3. Equinus contracture of right ankle    4. Onychocryptosis      Patient states that his feet are doing much better at this time.  He denies any additional issues to his right foot but states that he has had mild issues involving his left great toe consistent with an ingrown nail.  Today on exam, no significant signs of infection or inflammation are noted.  He states that symptoms have improved.  We did review further conservative care options of routine nail care and Epsom salt soaks.  We also discussed more assertive options of partial versus total nail avulsion with and without chemical matrixectomy.  Patient will continue to observe.  I have asked that he follow-up with me on an as-needed basis.    He is to follow up on an as needed basis.     No orders of the defined types were placed in this encounter.         Note is dictated utilizing voice recognition software. Unfortunately this leads to occasional typographical errors. I apologize in advance if the situation occurs. If questions occur please do not hesitate to call our office.    Transcribed from ambient dictation for EMILY Gomez DPM by Esther Mcintosh.  06/27/22   10:52 EDT    Patient verbalized consent to the visit recording.

## 2022-06-28 NOTE — ASSESSMENT & PLAN NOTE
Lipid abnormalities are improving with treatment.  continue current treatment  Lipids will be reassessed in 3 months.

## 2022-06-30 ENCOUNTER — TELEPHONE (OUTPATIENT)
Dept: FAMILY MEDICINE CLINIC | Facility: CLINIC | Age: 37
End: 2022-06-30

## 2022-07-15 ENCOUNTER — TELEPHONE (OUTPATIENT)
Dept: FAMILY MEDICINE CLINIC | Facility: CLINIC | Age: 37
End: 2022-07-15

## 2022-07-15 NOTE — TELEPHONE ENCOUNTER
Patient called requesting to know if he would be able to take muscle relaxers for a pulled muscle in his back with the other medications he is currently on. Please advise

## 2022-08-11 NOTE — PROGRESS NOTES
Subjective   Sourav Tamez is a 37 y.o. male. Presents to [facility]    Chief Complaint   Patient presents with   • Hypothyroidism       Hypothyroidism  This is a chronic problem. The current episode started more than 1 year ago. The problem occurs intermittently. The problem has been waxing and waning. Pertinent negatives include no abdominal pain, arthralgias, chest pain, coughing, fever, nausea, rash or vomiting. Treatments tried: levothyroxine 25 MCG. The treatment provided moderate relief.   Constipation  This is a new problem. The current episode started more than 1 month ago. The problem has been waxing and waning since onset. The stool is described as firm. The patient is not on a high fiber diet. He exercises regularly. There has not been adequate water intake. Pertinent negatives include no abdominal pain, bloating, diarrhea, fever, nausea, rectal pain or vomiting. He has tried nothing for the symptoms.        I personally reviewed and updated the patient's allergies, medications, problem list, and past medical, surgical, social, and family history. I have reviewed and confirmed the accuracy of the History of Present Illness and Review of Symptoms as documented by the MA/LPN/RN. Judy Bassett MD    Allergies:  Allergies   Allergen Reactions   • Amoxicillin Swelling and Shortness Of Breath   • Cetirizine Swelling   • Diphenhydramine Swelling   • Doxycycline Swelling   • Escitalopram Swelling     Swelling of throat   • Fexofenadine Anaphylaxis   • Loratadine Swelling   • Montelukast Swelling   • Penicillins Shortness Of Breath   • Prednisone Swelling   • Trazodone Swelling   • Codeine Myalgia   • Latex Rash   • Mucinex [Guaifenesin Er] Cough   • Olanzapine Rash   • Robitussin (Alcohol Free) [Guaifenesin] Cough       Social History:  Social History     Socioeconomic History   • Marital status:    Tobacco Use   • Smoking status: Never Smoker   • Smokeless tobacco: Never Used   Vaping  Use   • Vaping Use: Never used   Substance and Sexual Activity   • Alcohol use: Yes   • Drug use: No   • Sexual activity: Defer       Family History:  Family History   Problem Relation Age of Onset   • Hypertension Mother    • Hyperlipidemia Mother    • Hyperlipidemia Maternal Grandmother        Past Medical History :  Patient Active Problem List   Diagnosis   • Perennial allergic rhinitis   • ADD (attention deficit disorder)   • Asperger's syndrome   • Mixed hyperlipidemia   • Sliding hiatal hernia   • Anxiety   • Depression, major, recurrent, mild (HCC)   • Gastroesophageal reflux disease without esophagitis   • Hypothyroidism (acquired)   • Mild intermittent asthma without complication   • Labile mood   • Encounter for routine adult physical exam with abnormal findings   • Chronic idiopathic constipation   • Overweight   • Pain of fifth toe   • Lateral epicondylitis of right elbow   • Left leg pain   • Bilateral hearing loss   • Tinea cruris       Medication List:    Current Outpatient Medications:   •  ALBUTEROL SULFATE  (90 Base) MCG/ACT inhaler, INHALE 2 PUFFS BY MOUTH EVERY 4 TO 6 HOURS AS NEEDED. MAX 12 PUFFS DAILY., Disp: 18 g, Rfl: 3  •  budesonide-formoterol (Symbicort) 80-4.5 MCG/ACT inhaler, Inhale 2 puffs 2 (Two) Times a Day., Disp: 6.9 g, Rfl: 12  •  busPIRone (BUSPAR) 15 MG tablet, Take 15 mg by mouth 2 (two) times a day., Disp: , Rfl:   •  ibuprofen (ADVIL,MOTRIN) 800 MG tablet, Take 1 tablet by mouth Every 8 (Eight) Hours As Needed for Mild Pain  or Moderate Pain ., Disp: 90 tablet, Rfl: 0  •  lamoTRIgine (LaMICtal) 200 MG tablet, Take 1 tablet by mouth Daily., Disp: 30 tablet, Rfl: 12  •  metoprolol succinate XL (TOPROL-XL) 25 MG 24 hr tablet, TAKE 1 TABLET BY MOUTH DAILY, Disp: 90 tablet, Rfl: 3  •  multivitamin with minerals tablet tablet, Take 1 tablet by mouth Daily., Disp: , Rfl:   •  omeprazole (priLOSEC) 40 MG capsule, TAKE 1 CAPSULE BY MOUTH DAILY, Disp: 90 capsule, Rfl: 0  •   "polyethylene glycol (MIRALAX) 17 GM/SCOOP powder, Take 17 g by mouth Daily., Disp: 225 g, Rfl: 2  •  pravastatin (PRAVACHOL) 10 MG tablet, Take 1 tablet by mouth Daily., Disp: 30 tablet, Rfl: 12  •  Triamcinolone Acetonide (NASACORT) 55 MCG/ACT nasal inhaler, 2 sprays into the nostril(s) as directed by provider Daily., Disp: , Rfl:     Past Surgical History:  Past Surgical History:   Procedure Laterality Date   • TONSILLECTOMY  1988       Review of Systems:  Review of Systems   Constitutional: Negative for activity change and fever.   HENT: Negative for ear pain, rhinorrhea, sinus pressure and voice change.    Eyes: Negative for visual disturbance.   Respiratory: Negative for cough and shortness of breath.    Cardiovascular: Negative for chest pain.   Gastrointestinal: Positive for constipation. Negative for abdominal pain, bloating, diarrhea, nausea, rectal pain and vomiting.   Endocrine: Negative for cold intolerance and heat intolerance.   Genitourinary: Negative for frequency and urgency.   Musculoskeletal: Negative for arthralgias.   Skin: Negative for rash.   Neurological: Negative for syncope.   Hematological: Does not bruise/bleed easily.   Psychiatric/Behavioral: Negative for depressed mood. The patient is not nervous/anxious.        Physical Exam:  Vital Signs:  Vital Signs:   /70 (BP Location: Right arm, Patient Position: Sitting, Cuff Size: Adult)   Pulse 83   Temp 97.3 °F (36.3 °C) (Temporal)   Resp 18   Ht 167.6 cm (66\")   Wt 75.9 kg (167 lb 6.4 oz)   SpO2 98% Comment: room air  BMI 27.02 kg/m²     Result Review :   The following data was reviewed by: Judy Bassett MD on 08/12/2022:  CMP    CMP 10/22/21 2/22/22 6/22/22   Glucose 96 99 98   BUN 10 13 11   Creatinine 0.96 0.95 0.94   eGFR Non African Am 100 100    eGFR African Am 115 116    Sodium 143 143 141   Potassium 4.4 4.5 4.0   Chloride 104 103 103   Calcium 9.1 9.4 8.9   Total Protein 6.6 7.0 6.5   Albumin 4.4 4.8 4.8   Globulin " 2.2 2.2 1.7   Total Bilirubin 0.4 0.3 0.4   Alkaline Phosphatase 68 68 70   AST (SGOT) 22 25 30   ALT (SGPT) 48 (A) 56 (A) 46 (A)   (A) Abnormal value       Comments are available for some flowsheets but are not being displayed.           Lipid Panel    Lipid Panel 10/22/21 2/22/22 6/22/22   Total Cholesterol 212 (A) 190 167   Triglycerides 102 121 143   HDL Cholesterol 37 (A) 42 32 (A)   VLDL Cholesterol 19 22 26   LDL Cholesterol  156 (A) 126 (A) 109 (A)   (A) Abnormal value            TSH    TSH 10/22/21 6/22/22 8/9/22   TSH 4.540 (A) 5.900 (A) 3.630   (A) Abnormal value                     Physical Exam  Vitals reviewed.   Constitutional:       Appearance: Normal appearance. He is well-developed.   HENT:      Head: Normocephalic and atraumatic.   Eyes:      General:         Right eye: No discharge.         Left eye: No discharge.   Cardiovascular:      Rate and Rhythm: Normal rate and regular rhythm.      Heart sounds: Normal heart sounds. No murmur heard.    No friction rub. No gallop.   Pulmonary:      Effort: Pulmonary effort is normal. No respiratory distress.      Breath sounds: Normal breath sounds. No wheezing or rales.   Skin:     General: Skin is warm and dry.      Findings: No rash.   Neurological:      Mental Status: He is alert and oriented to person, place, and time.      Coordination: Coordination normal.      Gait: Gait normal.   Psychiatric:         Behavior: Behavior is cooperative.         Assessment and Plan:  Problems Addressed this Visit        Cardiac and Vasculature    Mixed hyperlipidemia (Chronic)     Lipid abnormalities are worsening.  Pharmacotherapy as ordered.  Lipids will be reassessed in 3 months.         Relevant Medications    pravastatin (PRAVACHOL) 10 MG tablet       Endocrine and Metabolic    Hypothyroidism (acquired) - Primary (Chronic)    Overweight     Patient's (Body mass index is 27.02 kg/m².) indicates that they are overweight with health conditions that include  hypothyroidism . Weight is improving with lifestyle modifications. BMI is is above average; BMI management plan is completed. We discussed portion control and increasing exercise.             Gastrointestinal Abdominal     Chronic idiopathic constipation     Start miralax    Diagnosis, treatment and and course discussed. Potential side effects discussed. Return if there is worsening or persistence of symptoms.            Relevant Medications    polyethylene glycol (MIRALAX) 17 GM/SCOOP powder      Diagnoses       Codes Comments    Hypothyroidism (acquired)    -  Primary ICD-10-CM: E03.9  ICD-9-CM: 244.9     Overweight     ICD-10-CM: E66.3  ICD-9-CM: 278.02     Mixed hyperlipidemia     ICD-10-CM: E78.2  ICD-9-CM: 272.2     Chronic idiopathic constipation     ICD-10-CM: K59.04  ICD-9-CM: 564.00            BMI is >= 25 and <30. (Overweight) The following options were offered after discussion;: weight loss educational material (shared in after visit summary), exercise counseling/recommendations and nutrition counseling/recommendations      An After Visit Summary and PPPS were given to the patient.       I wore protective equipment throughout this patient encounter to include mask and eyewear. Hand hygiene was performed before donning protective equipment and after removal when leaving the room.

## 2022-08-12 ENCOUNTER — OFFICE VISIT (OUTPATIENT)
Dept: FAMILY MEDICINE CLINIC | Facility: CLINIC | Age: 37
End: 2022-08-12

## 2022-08-12 VITALS
HEIGHT: 66 IN | DIASTOLIC BLOOD PRESSURE: 70 MMHG | TEMPERATURE: 97.3 F | BODY MASS INDEX: 26.9 KG/M2 | WEIGHT: 167.4 LBS | HEART RATE: 83 BPM | OXYGEN SATURATION: 98 % | RESPIRATION RATE: 18 BRPM | SYSTOLIC BLOOD PRESSURE: 100 MMHG

## 2022-08-12 DIAGNOSIS — K59.04 CHRONIC IDIOPATHIC CONSTIPATION: ICD-10-CM

## 2022-08-12 DIAGNOSIS — E66.3 OVERWEIGHT: ICD-10-CM

## 2022-08-12 DIAGNOSIS — E03.9 HYPOTHYROIDISM (ACQUIRED): Primary | Chronic | ICD-10-CM

## 2022-08-12 DIAGNOSIS — E78.2 MIXED HYPERLIPIDEMIA: Chronic | ICD-10-CM

## 2022-08-12 PROCEDURE — 99214 OFFICE O/P EST MOD 30 MIN: CPT | Performed by: FAMILY MEDICINE

## 2022-08-12 RX ORDER — POLYETHYLENE GLYCOL 3350 17 G/17G
17 POWDER, FOR SOLUTION ORAL DAILY
Qty: 225 G | Refills: 2 | Status: SHIPPED | OUTPATIENT
Start: 2022-08-12

## 2022-08-12 RX ORDER — PRAVASTATIN SODIUM 10 MG
10 TABLET ORAL DAILY
Qty: 30 TABLET | Refills: 12 | Status: SHIPPED | OUTPATIENT
Start: 2022-08-12

## 2022-08-12 NOTE — ASSESSMENT & PLAN NOTE
Patient's (Body mass index is 27.02 kg/m².) indicates that they are overweight with health conditions that include hypothyroidism . Weight is improving with lifestyle modifications. BMI is is above average; BMI management plan is completed. We discussed portion control and increasing exercise.

## 2022-08-22 NOTE — ASSESSMENT & PLAN NOTE
Start miralax    Diagnosis, treatment and and course discussed. Potential side effects discussed. Return if there is worsening or persistence of symptoms.

## 2022-08-22 NOTE — ASSESSMENT & PLAN NOTE
Lipid abnormalities are worsening.  Pharmacotherapy as ordered.  Lipids will be reassessed in 3 months.

## 2022-09-19 RX ORDER — ALBUTEROL SULFATE 90 UG/1
2 AEROSOL, METERED RESPIRATORY (INHALATION) EVERY 4 HOURS PRN
Qty: 18 G | Refills: 3 | Status: SHIPPED | OUTPATIENT
Start: 2022-09-19 | End: 2023-01-04 | Stop reason: SDUPTHER

## 2022-09-29 ENCOUNTER — FLU SHOT (OUTPATIENT)
Dept: FAMILY MEDICINE CLINIC | Facility: CLINIC | Age: 37
End: 2022-09-29

## 2022-09-29 DIAGNOSIS — Z23 NEED FOR INFLUENZA VACCINATION: Primary | ICD-10-CM

## 2022-09-29 PROCEDURE — 90686 IIV4 VACC NO PRSV 0.5 ML IM: CPT | Performed by: FAMILY MEDICINE

## 2022-09-29 PROCEDURE — G0008 ADMIN INFLUENZA VIRUS VAC: HCPCS | Performed by: FAMILY MEDICINE

## 2022-11-17 PROBLEM — Z00.01 ENCOUNTER FOR ROUTINE ADULT PHYSICAL EXAM WITH ABNORMAL FINDINGS: Status: RESOLVED | Noted: 2020-07-13 | Resolved: 2022-11-17

## 2022-11-17 PROBLEM — Z00.00 MEDICARE ANNUAL WELLNESS VISIT, SUBSEQUENT: Status: ACTIVE | Noted: 2022-11-17

## 2022-11-17 NOTE — PROGRESS NOTES
Subsequent Medicare Wellness Visit    Subjective    History of Present Illness:  Sourav Tamez is a 37 y.o. male who presents for a Subsequent Medicare Wellness Visit.    The following portions of the patient's history were reviewed and   updated as appropriate: allergies, current medications, past family history, past medical history, past social history, past surgical history and problem list.  Family History   Problem Relation Age of Onset   • Hypertension Mother    • Hyperlipidemia Mother    • Hyperlipidemia Maternal Grandmother      Past Surgical History:   Procedure Laterality Date   • TONSILLECTOMY  1988        Compared to one year ago, the patient feels his physical   health is the same.    Compared to one year ago, the patient feels his mental   health is the same.    Recent Hospitalizations:  He was not admitted to the hospital during the last year.       Current Medical Providers:  Patient Care Team:  Judy Bassett MD as PCP - General  Judy Bassett MD as PCP - Family Medicine    Outpatient Medications Prior to Visit   Medication Sig Dispense Refill   • albuterol sulfate  (90 Base) MCG/ACT inhaler Inhale 2 puffs Every 4 (Four) Hours As Needed for Wheezing. 18 g 3   • budesonide-formoterol (Symbicort) 80-4.5 MCG/ACT inhaler Inhale 2 puffs 2 (Two) Times a Day. 6.9 g 12   • busPIRone (BUSPAR) 15 MG tablet Take 15 mg by mouth 2 (two) times a day.     • ibuprofen (ADVIL,MOTRIN) 800 MG tablet Take 1 tablet by mouth Every 8 (Eight) Hours As Needed for Mild Pain  or Moderate Pain . 90 tablet 0   • lamoTRIgine (LaMICtal) 200 MG tablet Take 1 tablet by mouth Daily. 30 tablet 12   • metoprolol succinate XL (TOPROL-XL) 25 MG 24 hr tablet TAKE 1 TABLET BY MOUTH DAILY 90 tablet 3   • multivitamin with minerals tablet tablet Take 1 tablet by mouth Daily.     • nystatin (MYCOSTATIN) 423363 UNIT/GM cream Apply  topically to the appropriate area as directed 2 (Two) Times a Day.     • omeprazole (priLOSEC) 40  "MG capsule TAKE 1 CAPSULE BY MOUTH DAILY 90 capsule 0   • polyethylene glycol (MIRALAX) 17 GM/SCOOP powder Take 17 g by mouth Daily. 225 g 2   • pravastatin (PRAVACHOL) 10 MG tablet Take 1 tablet by mouth Daily. 30 tablet 12   • Triamcinolone Acetonide (NASACORT) 55 MCG/ACT nasal inhaler 2 sprays into the nostril(s) as directed by provider Daily.       No facility-administered medications prior to visit.     Pain Score    11/21/22 0941   PainSc: 0-No pain      No opioid medication identified on active medication list. I have reviewed chart for other potential  high risk medication/s and harmful drug interactions in the elderly.          Aspirin is not on active medication list.  Aspirin use is not indicated based on review of current medical condition/s. Risk of harm outweighs potential benefits.  .    Patient Active Problem List   Diagnosis   • Perennial allergic rhinitis   • ADD (attention deficit disorder)   • Asperger's syndrome   • Mixed hyperlipidemia   • Sliding hiatal hernia   • Anxiety   • Depression, major, recurrent, mild (HCC)   • Gastroesophageal reflux disease without esophagitis   • Hypothyroidism (acquired)   • Mild intermittent asthma without complication   • Labile mood   • Chronic idiopathic constipation   • Overweight   • Pain of fifth toe   • Lateral epicondylitis of right elbow   • Left leg pain   • Bilateral hearing loss   • Tinea cruris   • Medicare annual wellness visit, subsequent     Advance Care Planning  Advance Directive is on file.  ACP discussion was held with the patient during this visit. Patient has an advance directive in EMR which is still valid.          Objective    Vitals:    11/21/22 0941   BP: 110/80   BP Location: Right arm   Patient Position: Sitting   Cuff Size: Adult   Pulse: 84   Resp: 18   Temp: 97.7 °F (36.5 °C)   TempSrc: Temporal   SpO2: 98%  Comment: room air   Weight: 76.9 kg (169 lb 9.6 oz)   Height: 167.6 cm (66\")   PainSc: 0-No pain       Does the patient have " evidence of cognitive impairment? No           HEALTH RISK ASSESSMENT    Smoking Status:  Social History     Tobacco Use   Smoking Status Never   Smokeless Tobacco Never     Alcohol Consumption:  Social History     Substance and Sexual Activity   Alcohol Use Yes     Fall Risk Screen:    MAKSIMADI Fall Risk Assessment was completed, and patient is at LOW risk for falls.Assessment completed on:11/21/2022    Depression Screening:  PHQ-2/PHQ-9 Depression Screening 11/21/2022   Retired PHQ-9 Total Score -   Retired Total Score -   Little Interest or Pleasure in Doing Things 0-->not at all   Feeling Down, Depressed or Hopeless 0-->not at all   PHQ-9: Brief Depression Severity Measure Score 0   Little interest or pleasure in doing things -   Feeling down, depressed, or hopeless -   Trouble falling or staying asleep, or sleeping too much -   Feeling tired or having little energy -   Poor appetite or overeating -   Feeling bad about yourself - or that you are a failure or have let yourself or your family down -   Trouble concentrating on things, such as reading the newspaper or watching television -   Moving or speaking so slowly that other people could have noticed. Or the opposite - being so fidgety or restless that you have been moving around a lot more than usual -   Thoughts that you would be better off dead, or of hurting yourself in some way -   How difficult have these problems made it for you to do your work, take care of things at home, or get along with other people? -       Health Habits and Functional and Cognitive Screening:  Functional & Cognitive Status 11/21/2022   Do you have difficulty preparing food and eating? No   Do you have difficulty bathing yourself, getting dressed or grooming yourself? No   Do you have difficulty using the toilet? No   Do you have difficulty moving around from place to place? No   Do you have trouble with steps or getting out of a bed or a chair? No   Current Diet Unhealthy Diet    Dental Exam Not up to date   Eye Exam Up to date   Exercise (times per week) 1 times per week   Current Exercises Include No Regular Exercise   Current Exercise Activities Include -   Do you need help using the phone?  No   Are you deaf or do you have serious difficulty hearing?  No   Do you need help with transportation? No   Do you need help shopping? No   Do you need help preparing meals?  No   Do you need help with housework?  No   Do you need help with laundry? No   Do you need help taking your medications? No   Do you need help managing money? No   Do you ever drive or ride in a car without wearing a seat belt? No   Have you felt unusual stress, anger or loneliness in the last month? No   Who do you live with? Spouse   If you need help, do you have trouble finding someone available to you? No   Have you been bothered in the last four weeks by sexual problems? No   Do you have difficulty concentrating, remembering or making decisions? No       Age-appropriate Screening Schedule:  Refer to the list below for future screening recommendations based on patient's age, sex and/or medical conditions. Orders for these recommended tests are listed in the plan section. The patient has been provided with a written plan.    Health Maintenance   Topic Date Due   • TDAP/TD VACCINES (1 - Tdap) 06/24/2023 (Originally 1/11/2002)   • LIPID PANEL  06/22/2023   • INFLUENZA VACCINE  Completed              Assessment & Plan   Medically necessary, significant, and separately identifiable medical problems identified during this visit are addressed on a separate visit note.    CMS Preventative Services Quick Reference  Risk Factors Identified During Encounter  None Identified  The above risks/problems have been discussed with the patient.  Follow up actions/plans if indicated are seen below in the Assessment/Plan Section.  Pertinent information has been shared with the patient in the After Visit Summary.    Follow Up:   No follow-ups  on file.     An After Visit Summary and PPPS were made available to the patient.    Medicare Wellness  Personal Prevention Plan of Service     Date of Office Visit:  2022  Encounter Provider:  Judy Bassett MD  Place of Service:  [facility]  Patient Name: Sourav Tamez  :  1985    As part of the Medicare Wellness portion of your visit today, we are providing you with this personalized preventive plan of services (PPPS). This plan is based upon recommendations of the [place] Preventive Services Task Force (USPSTF) and the Advisory Committee on Immunization Practices (ACIP).    This lists the preventive care services that should be considered, and provides dates of when you are due. Items listed as completed are up-to-date and do not require any further intervention.    Health Maintenance   Topic Date Due   • COVID-19 Vaccine (3 - Booster for Pfizer series) 08/10/2021   • Pneumococcal Vaccine 0-64 (1 - PCV) 2023 (Originally 1989)   • TDAP/TD VACCINES (1 - Tdap) 2023 (Originally 2002)   • LIPID PANEL  2023   • ANNUAL WELLNESS VISIT  2023   • HEPATITIS C SCREENING  Completed   • INFLUENZA VACCINE  Completed       Orders Placed This Encounter   Procedures   • Comprehensive Metabolic Panel     Order Specific Question:   Release to patient     Answer:   Routine Release   • Lipid Panel With / Chol / HDL Ratio     Order Specific Question:   Release to patient     Answer:   Routine Release   • TSH     Order Specific Question:   Release to patient     Answer:   Routine Release   • CBC & Differential     Order Specific Question:   Manual Differential     Answer:   No       No follow-ups on file.  Sit-to-Stand Exercise    The sit-to-stand exercise (also known as the chair stand or chair rise exercise) strengthens your lower body and helps you maintain or improve your mobility and independence. The goal is to do the sit-to-stand exercise  without using your hands. This will be easier as you become stronger. You should always talk with your health care provider before starting any exercise program, especially if you have had recent surgery.  Do the exercise exactly as told by your health care provider and adjust it as directed. It is normal to feel mild stretching, pulling, tightness, or discomfort as you do this exercise, but you should stop right away if you feel sudden pain or your pain gets worse. Do not begin doing this exercise until told by your health care provider.  What the sit-to-stand exercise does  The sit-to-stand exercise helps to strengthen the muscles in your thighs and the muscles in the center of your body that give you stability (core muscles). This exercise is especially helpful if:  · You have had knee or hip surgery.  · You have trouble getting up from a chair, out of a car, or off the toilet.  How to do the sit-to-stand exercise  1. Sit toward the front edge of a sturdy chair without armrests. Your knees should be bent and your feet should be flat on the floor and shoulder-width apart.  2. Place your hands lightly on each side of the seat. Keep your back and neck as straight as possible, with your chest slightly forward.  3. Breathe in slowly. Lean forward and slightly shift your weight to the front of your feet.  4. Breathe out as you slowly stand up. Use your hands as little as possible.  5. Stand and pause for a full breath in and out.  6. Breathe in as you sit down slowly. Tighten your core and abdominal muscles to control your lowering as much as possible.  7. Breathe out slowly.  8. Do this exercise 10-15 times. If needed, do it fewer times until you build up strength.  9. Rest for 1 minute, then do another set of 10-15 repetitions.  To change the difficulty of the sit-to-stand exercise  · If the exercise is too difficult, use a chair with sturdy armrests, and push off the armrests to help you come to the standing  position. You can also use the armrests to help slowly lower yourself back to sitting. As this gets easier, try to use your arms less. You can also place a firm cushion or pillow on the chair to make the surface higher.  · If this exercise is too easy, do not use your arms to help raise or lower yourself. You can also wear a weighted vest, use hand weights, increase your repetitions, or try a lower chair.  General tips  · You may feel tired when starting an exercise routine. This is normal.  · You may have muscle soreness that lasts a few days. This is normal. As you get stronger, you may not feel muscle soreness.  · Use smooth, steady movements.  · Do not  hold your breath during strength exercises. This can cause unsafe changes in your blood pressure.  · Breathe in slowly through your nose, and breathe out slowly through your mouth.  Summary  · Strengthening your lower body is an important step to help you move safely and independently.  · The sit-to-stand exercise helps strengthen the muscles in your thighs and core.  · You should always talk with your health care provider before starting any exercise program, especially if you have had recent surgery.  This information is not intended to replace advice given to you by your health care provider. Make sure you discuss any questions you have with your health care provider.  Document Revised: 10/16/2019 Document Reviewed: 02/08/2018  Elsevier Patient Education © 2021 [employer]    Advance Care Planning and Advance Directives     You make decisions on a daily basis - decisions about where you want to live, your career, your home, your life. Perhaps one of the most important decisions you face is your choice for future medical care. Take time to talk with your family and your healthcare team and start planning today.  Advance Care Planning is a process that can help you:  · Understand possible future healthcare decisions in light of your own experiences  · Reflect  on those decision in light of your goals and values  · Discuss your decisions with those closest to you and the healthcare professionals that care for you  · Make a plan by creating a document that reflects your wishes    Surrogate Decision Maker  In the event of a medical emergency, which has left you unable to communicate or to make your own decisions, you would need someone to make decisions for you.  It is important to discuss your preferences for medical treatment with this person while you are in good health.     Qualities of a surrogate decision maker:  • Willing to take on this role and responsibility  • Knows what you want for future medical care  • Willing to follow your wishes even if they don't agree with them  • Able to make difficult medical decisions under stressful circumstances    Advance Directives  These are legal documents you can create that will guide your healthcare team and decision maker(s) when needed. These documents can be stored in the electronic medical record.    · Living Will - a legal document to guide your care if you have a terminal condition or a serious illness and are unable to communicate. States vary by statute in document names/types, but most forms may include one or more of the following:        -  Directions regarding life-prolonging treatments        -  Directions regarding artificially provided nutrition/hydration        -  Choosing a healthcare decision maker        -  Direction regarding organ/tissue donation    · Durable Power of  for Healthcare - this document names an -in-fact to make medical decisions for you, but it may also allow this person to make personal and financial decisions for you. Please seek the advice of an  if you need this type of document.    **Advance Directives are not required and no one may discriminate against you if you do not sign one.    Medical Orders  Many states allow specific forms/orders signed by your physician  to record your wishes for medical treatment in your current state of health. This form, signed in personal communication with your physician, addresses resuscitation and other medical interventions that you may or may not want.  For more information or to schedule a time with a [facility] Advance Care Planning Facilitator contact: [facility] or call [phone] and someone will contact you directly.    Fall Prevention in the Home, Adult  Falls can cause injuries and can happen to people of all ages. There are many things you can do to make your home safe and to help prevent falls. Ask for help when making these changes.  What actions can I take to prevent falls?  General Instructions  1. Use good lighting in all rooms. Replace any light bulbs that burn out.  2. Turn on the lights in dark areas. Use night-lights.  3. Keep items that you use often in easy-to-reach places. Lower the shelves around your home if needed.  4. Set up your furniture so you have a clear path. Avoid moving your furniture around.  5. Do not have throw rugs or other things on the floor that can make you trip.  6. Avoid walking on wet floors.  7. If any of your floors are uneven, fix them.  8. Add color or contrast paint or tape to clearly ty and help you see:  ? Grab bars or handrails.  ? First and last steps of staircases.  ? Where the edge of each step is.  9. If you use a stepladder:  ? Make sure that it is fully opened. Do not climb a closed stepladder.  ? Make sure the sides of the stepladder are locked in place.  ? Ask someone to hold the stepladder while you use it.  10. Know where your pets are when moving through your home.  What can I do in the bathroom?         · Keep the floor dry. Clean up any water on the floor right away.  · Remove soap buildup in the tub or shower.  · Use nonskid mats or decals on the floor of the tub or shower.  · Attach bath mats securely with double-sided, nonslip rug tape.  · If you need to  sit down in the shower, use a plastic, nonslip stool.  · Install grab bars by the toilet and in the tub and shower. Do not use towel bars as grab bars.  What can I do in the bedroom?  · Make sure that you have a light by your bed that is easy to reach.  · Do not use any sheets or blankets for your bed that hang to the floor.  · Have a firm chair with side arms that you can use for support when you get dressed.  What can I do in the kitchen?  · Clean up any spills right away.  · If you need to reach something above you, use a step stool with a grab bar.  · Keep electrical cords out of the way.  · Do not use floor polish or wax that makes floors slippery.  What can I do with my stairs?  · Do not leave any items on the stairs.  · Make sure that you have a light switch at the top and the bottom of the stairs.  · Make sure that there are handrails on both sides of the stairs. Fix handrails that are broken or loose.  · Install nonslip stair treads on all your stairs.  · Avoid having throw rugs at the top or bottom of the stairs.  · Choose a carpet that does not hide the edge of the steps on the stairs.  · Check carpeting to make sure that it is firmly attached to the stairs. Fix carpet that is loose or worn.  What can I do on the outside of my home?  · Use bright outdoor lighting.  · Fix the edges of walkways and driveways and fix any cracks.  · Remove anything that might make you trip as you walk through a door, such as a raised step or threshold.  · Trim any bushes or trees on paths to your home.  · Check to see if handrails are loose or broken and that both sides of all steps have handrails.  · Install guardrails along the edges of any raised decks and porches.  · Clear paths of anything that can make you trip, such as tools or rocks.  · Have leaves, snow, or ice cleared regularly.  · Use sand or salt on paths during winter.  · Clean up any spills in your garage right away. This includes grease or oil spills.  What  other actions can I take?  1. Wear shoes that:  ? Have a low heel. Do not wear high heels.  ? Have rubber bottoms.  ? Feel good on your feet and fit well.  ? Are closed at the toe. Do not wear open-toe sandals.  2. Use tools that help you move around if needed. These include:  ? Canes.  ? Walkers.  ? Scooters.  ? Crutches.  3. Review your medicines with your doctor. Some medicines can make you feel dizzy. This can increase your chance of falling.  Ask your doctor what else you can do to help prevent falls.  Where to find more information  · Centers for Disease Control and Prevention, STEADI: www.cdc.gov  · National [place] on Aging: www.triston.nih.gov  Contact a doctor if:  · You are afraid of falling at home.  · You feel weak, drowsy, or dizzy at home.  · You fall at home.  Summary  · There are many simple things that you can do to make your home safe and to help prevent falls.  · Ways to make your home safe include removing things that can make you trip and installing grab bars in the bathroom.  · Ask for help when making these changes in your home.  This information is not intended to replace advice given to you by your health care provider. Make sure you discuss any questions you have with your health care provider.  Document Revised: 07/21/2021 Document Reviewed: 07/21/2021  "[employer] Patient Education © 2021 "[employer] Inc.         +++++E/M portion medically necessary secondary to new or uncontrolled chronic problem+++++++    Subjective   Sourav Tamez is here for:    Chief Complaint   Patient presents with   • Medicare Wellness-subsequent   • Hyperlipidemia   • Hypothyroidism       Hyperlipidemia  This is a chronic problem. The current episode started more than 1 year ago. Exacerbating diseases include hypothyroidism. He has no history of diabetes or obesity. Pertinent negatives include no chest pain or shortness of breath. Current antihyperlipidemic treatment includes statins. The current treatment provides  moderate improvement of lipids. Risk factors for coronary artery disease include dyslipidemia, hypertension and male sex.   Hypothyroidism  This is a chronic problem. The current episode started more than 1 year ago. Pertinent negatives include no chest pain. He has tried nothing for the symptoms.         Physical Exam:  Review of Systems   Respiratory: Negative for shortness of breath.    Cardiovascular: Negative for chest pain.        Physical Exam  Vitals reviewed.   Constitutional:       General: He is not in acute distress.     Appearance: He is well-developed. He is not diaphoretic.   HENT:      Head: Normocephalic and atraumatic.      Right Ear: Tympanic membrane and external ear normal.      Left Ear: Tympanic membrane and external ear normal.      Nose: Nose normal.      Mouth/Throat:      Mouth: Mucous membranes are moist.      Pharynx: No oropharyngeal exudate.   Eyes:      General: No scleral icterus.        Right eye: No discharge.         Left eye: No discharge.      Conjunctiva/sclera: Conjunctivae normal.      Pupils: Pupils are equal, round, and reactive to light.   Neck:      Thyroid: No thyromegaly.      Trachea: No tracheal deviation.   Cardiovascular:      Rate and Rhythm: Normal rate and regular rhythm.      Heart sounds: Normal heart sounds. No murmur heard.    No friction rub. No gallop.   Pulmonary:      Effort: Pulmonary effort is normal. No respiratory distress.      Breath sounds: Normal breath sounds. No stridor. No wheezing or rales.   Abdominal:      General: Bowel sounds are normal. There is no distension.      Palpations: Abdomen is soft. There is no mass.      Tenderness: There is no abdominal tenderness. There is no guarding or rebound.      Hernia: There is no hernia in the left inguinal area or right inguinal area.   Genitourinary:     Penis: Normal. No tenderness or discharge.       Testes: Normal.         Right: Mass, tenderness or swelling not present.         Left: Mass,  tenderness or swelling not present.   Musculoskeletal:         General: No tenderness or deformity. Normal range of motion.      Cervical back: Normal range of motion and neck supple.   Skin:     General: Skin is warm and dry.      Capillary Refill: Capillary refill takes less than 2 seconds.      Coloration: Skin is not pale.      Findings: No erythema or rash.   Neurological:      Mental Status: He is alert and oriented to person, place, and time. He is not disoriented.      Cranial Nerves: No cranial nerve deficit.      Sensory: No sensory deficit.      Motor: No tremor, atrophy or abnormal muscle tone.      Coordination: Coordination normal.      Gait: Gait normal.      Deep Tendon Reflexes: Reflexes are normal and symmetric. Reflexes normal.   Psychiatric:         Behavior: Behavior normal.         Thought Content: Thought content normal.         Judgment: Judgment normal.         Assessment and Plan:  Problem List Items Addressed This Visit        Cardiac and Vasculature    Mixed hyperlipidemia (Chronic)    Relevant Orders    Comprehensive Metabolic Panel    Lipid Panel With / Chol / HDL Ratio       Endocrine and Metabolic    Hypothyroidism (acquired) (Chronic)    Overview     recheck today         Relevant Orders    TSH    Overweight    Current Assessment & Plan     Patient's (Body mass index is 27.37 kg/m².) indicates that they are overweight with health conditions that include dyslipidemias . Weight is worsening. BMI is is above average; BMI management plan is completed. We discussed portion control and increasing exercise.             Health Encounters    Medicare annual wellness visit, subsequent - Primary   Other Visit Diagnoses     Long term current use of antipsychotic medication  (Chronic)       Chck cbc  Medication can affect his cbc    Relevant Orders    CBC & Differential

## 2022-11-21 ENCOUNTER — OFFICE VISIT (OUTPATIENT)
Dept: FAMILY MEDICINE CLINIC | Facility: CLINIC | Age: 37
End: 2022-11-21

## 2022-11-21 VITALS
RESPIRATION RATE: 18 BRPM | OXYGEN SATURATION: 98 % | BODY MASS INDEX: 27.26 KG/M2 | HEART RATE: 84 BPM | WEIGHT: 169.6 LBS | HEIGHT: 66 IN | DIASTOLIC BLOOD PRESSURE: 80 MMHG | TEMPERATURE: 97.7 F | SYSTOLIC BLOOD PRESSURE: 110 MMHG

## 2022-11-21 DIAGNOSIS — Z79.899 LONG TERM CURRENT USE OF ANTIPSYCHOTIC MEDICATION: Chronic | ICD-10-CM

## 2022-11-21 DIAGNOSIS — E66.3 OVERWEIGHT: ICD-10-CM

## 2022-11-21 DIAGNOSIS — Z00.00 MEDICARE ANNUAL WELLNESS VISIT, SUBSEQUENT: Primary | ICD-10-CM

## 2022-11-21 DIAGNOSIS — E03.9 HYPOTHYROIDISM (ACQUIRED): Chronic | ICD-10-CM

## 2022-11-21 DIAGNOSIS — E78.2 MIXED HYPERLIPIDEMIA: Chronic | ICD-10-CM

## 2022-11-21 PROCEDURE — G0439 PPPS, SUBSEQ VISIT: HCPCS | Performed by: FAMILY MEDICINE

## 2022-11-21 PROCEDURE — 1159F MED LIST DOCD IN RCRD: CPT | Performed by: FAMILY MEDICINE

## 2022-11-21 RX ORDER — NYSTATIN 100000 U/G
CREAM TOPICAL 2 TIMES DAILY
COMMUNITY
Start: 2022-11-05 | End: 2023-02-22

## 2022-11-21 NOTE — ASSESSMENT & PLAN NOTE
Patient's (Body mass index is 27.37 kg/m².) indicates that they are overweight with health conditions that include dyslipidemias . Weight is worsening. BMI is is above average; BMI management plan is completed. We discussed portion control and increasing exercise.

## 2022-11-29 LAB
ALBUMIN SERPL-MCNC: 4.8 G/DL (ref 4–5)
ALBUMIN/GLOB SERPL: 2.1 {RATIO} (ref 1.2–2.2)
ALP SERPL-CCNC: 77 IU/L (ref 44–121)
ALT SERPL-CCNC: 83 IU/L (ref 0–44)
AST SERPL-CCNC: 39 IU/L (ref 0–40)
BASOPHILS # BLD AUTO: 0 X10E3/UL (ref 0–0.2)
BASOPHILS NFR BLD AUTO: 0 %
BILIRUB SERPL-MCNC: 0.4 MG/DL (ref 0–1.2)
BUN SERPL-MCNC: 13 MG/DL (ref 6–20)
BUN/CREAT SERPL: 15 (ref 9–20)
CALCIUM SERPL-MCNC: 9.5 MG/DL (ref 8.7–10.2)
CHLORIDE SERPL-SCNC: 104 MMOL/L (ref 96–106)
CHOLEST SERPL-MCNC: 236 MG/DL (ref 100–199)
CHOLEST/HDLC SERPL: 4.9 RATIO (ref 0–5)
CO2 SERPL-SCNC: 27 MMOL/L (ref 20–29)
CREAT SERPL-MCNC: 0.87 MG/DL (ref 0.76–1.27)
EGFRCR SERPLBLD CKD-EPI 2021: 113 ML/MIN/1.73
EOSINOPHIL # BLD AUTO: 0.1 X10E3/UL (ref 0–0.4)
EOSINOPHIL NFR BLD AUTO: 1 %
ERYTHROCYTE [DISTWIDTH] IN BLOOD BY AUTOMATED COUNT: 12.2 % (ref 11.6–15.4)
GLOBULIN SER CALC-MCNC: 2.3 G/DL (ref 1.5–4.5)
GLUCOSE SERPL-MCNC: 96 MG/DL (ref 70–99)
HCT VFR BLD AUTO: 48.7 % (ref 37.5–51)
HDLC SERPL-MCNC: 48 MG/DL
HGB BLD-MCNC: 16.7 G/DL (ref 13–17.7)
IMM GRANULOCYTES # BLD AUTO: 0 X10E3/UL (ref 0–0.1)
IMM GRANULOCYTES NFR BLD AUTO: 0 %
LDLC SERPL CALC-MCNC: 174 MG/DL (ref 0–99)
LYMPHOCYTES # BLD AUTO: 1.7 X10E3/UL (ref 0.7–3.1)
LYMPHOCYTES NFR BLD AUTO: 23 %
MCH RBC QN AUTO: 30 PG (ref 26.6–33)
MCHC RBC AUTO-ENTMCNC: 34.3 G/DL (ref 31.5–35.7)
MCV RBC AUTO: 87 FL (ref 79–97)
MONOCYTES # BLD AUTO: 0.4 X10E3/UL (ref 0.1–0.9)
MONOCYTES NFR BLD AUTO: 5 %
NEUTROPHILS # BLD AUTO: 5.1 X10E3/UL (ref 1.4–7)
NEUTROPHILS NFR BLD AUTO: 71 %
PLATELET # BLD AUTO: 226 X10E3/UL (ref 150–450)
POTASSIUM SERPL-SCNC: 4.6 MMOL/L (ref 3.5–5.2)
PROT SERPL-MCNC: 7.1 G/DL (ref 6–8.5)
RBC # BLD AUTO: 5.57 X10E6/UL (ref 4.14–5.8)
SODIUM SERPL-SCNC: 142 MMOL/L (ref 134–144)
TRIGL SERPL-MCNC: 80 MG/DL (ref 0–149)
TSH SERPL DL<=0.005 MIU/L-ACNC: 4.79 UIU/ML (ref 0.45–4.5)
VLDLC SERPL CALC-MCNC: 14 MG/DL (ref 5–40)
WBC # BLD AUTO: 7.3 X10E3/UL (ref 3.4–10.8)

## 2022-11-30 ENCOUNTER — TELEPHONE (OUTPATIENT)
Dept: FAMILY MEDICINE CLINIC | Facility: CLINIC | Age: 37
End: 2022-11-30

## 2022-11-30 NOTE — TELEPHONE ENCOUNTER
----- Message from Sourav Tamez sent at 11/28/2022  9:44 PM EST -----  Regarding: Blood work   Contact: [phone]  Giorgio it’s Sourav I got my blood work done November 28th 2022 and was wanting to know if dr. Bassett wants to see me for my lab results???

## 2022-12-02 ENCOUNTER — TELEPHONE (OUTPATIENT)
Dept: FAMILY MEDICINE CLINIC | Facility: CLINIC | Age: 37
End: 2022-12-02

## 2022-12-02 NOTE — TELEPHONE ENCOUNTER
----- Message from Calos Morgan MA sent at 11/30/2022  9:23 AM EST -----  Regarding: FW: results  Contact: [phone]    ----- Message -----  From: Sourav Tamez  Sent: 11/30/2022   9:04 AM EST  To: Raquel Carlson  Clinical Pool  Subject: results                                          ok dr palomares wants me to eat tuna and salmon and walnuts but I can’t unfortunately I’m allergic to them any kind of fish makes my throat swell up   And the walnuts makes my nose gets really stuffy and a little hard to breathe through my nose only and it feels swollen but that could just be the stuffiness and I have sneezing fits and I love walnuts that happens in the first 5-10 minutes of eating walnuts same with fish     Is there anything else that the fish and the walnuts can be swapped for to eat ???

## 2022-12-13 RX ORDER — METOPROLOL SUCCINATE 25 MG/1
25 TABLET, EXTENDED RELEASE ORAL DAILY
Qty: 90 TABLET | Refills: 3 | OUTPATIENT
Start: 2022-12-13

## 2022-12-14 ENCOUNTER — TELEPHONE (OUTPATIENT)
Dept: FAMILY MEDICINE CLINIC | Facility: CLINIC | Age: 37
End: 2022-12-14

## 2022-12-22 ENCOUNTER — DOCUMENTATION (OUTPATIENT)
Dept: PHYSICAL THERAPY | Facility: CLINIC | Age: 37
End: 2022-12-22

## 2022-12-22 DIAGNOSIS — M79.631 RIGHT FOREARM PAIN: ICD-10-CM

## 2022-12-22 DIAGNOSIS — M77.11 LATERAL EPICONDYLITIS OF RIGHT ELBOW: Primary | ICD-10-CM

## 2022-12-22 DIAGNOSIS — M25.521 RIGHT ELBOW PAIN: ICD-10-CM

## 2022-12-22 NOTE — PROGRESS NOTES
Discharge Summary  Discharge Summary from Physical Therapy Report      Dates  PT visit: 1/4/2022 - 5/16/2022  Number of Visits: 10     Discharge Status of Patient: See Progress Note dated 5/16/2022    Goals: Partially Met    Discharge Plan: Continue with current home exercise program as instructed    Comments : Last 2 appts were not attended by pt and no additional apps scheduled.    Date of Discharge 12/22/2022        Zo Jim, PT  Physical Therapist  [place] Lic# [id number]

## 2023-01-04 DIAGNOSIS — J45.20 MILD INTERMITTENT ASTHMA WITHOUT COMPLICATION: ICD-10-CM

## 2023-01-05 RX ORDER — BUDESONIDE AND FORMOTEROL FUMARATE DIHYDRATE 80; 4.5 UG/1; UG/1
2 AEROSOL RESPIRATORY (INHALATION) 2 TIMES DAILY
Qty: 6.9 G | Refills: 12 | Status: SHIPPED | OUTPATIENT
Start: 2023-01-05

## 2023-01-05 RX ORDER — ALBUTEROL SULFATE 90 UG/1
2 AEROSOL, METERED RESPIRATORY (INHALATION) EVERY 4 HOURS PRN
Qty: 18 G | Refills: 3 | Status: SHIPPED | OUTPATIENT
Start: 2023-01-05

## 2023-01-05 NOTE — PROGRESS NOTES
Subjective   Sourav Tamez is a 38 y.o. male. Presents to [facility]    Chief Complaint   Patient presents with   • Leg Pain   • Loss of Consciousness       History of Present Illness  Syncope:   The syncopal episode occurred while the patient was standing, 2 weeks ago. The fainting episode followed leg giving out. Prodromal symptoms included none. History was given by the patient. The patient a sudden loss of consciousness without warning. There has been 3 times in the last 2 weeks. Preventive factors include none. Associated symptoms included no other pertinent symptoms. Current treatment includes none.  Leg Pain   The incident occurred more than 1 week ago. There was no injury mechanism. The pain is present in the right leg and right knee. The quality of the pain is described as aching. The pain is at a severity of 5/10. The pain is mild. The pain has been constant since onset. Pertinent negatives include no numbness or tingling. He reports no foreign bodies present. The symptoms are aggravated by weight bearing. He has tried ice and heat for the symptoms.   Facial Pain  This is a new problem. The current episode started 1 to 4 weeks ago (patient hit face when he had a syncopal episode). The problem has been resolved. Pertinent negatives include no abdominal pain, numbness or weakness. He has tried ice and acetaminophen for the symptoms. The treatment provided mild relief.   Shoulder Injury   The incident occurred at home. The left shoulder is affected. The incident occurred more than 1 week ago. The injury mechanism was a fall (syncope). The quality of the pain is described as aching. The pain does not radiate. Pertinent negatives include no numbness or tingling. He has tried ice and acetaminophen for the symptoms. The treatment provided mild relief.      Shoulder is better. Face is better. He right knee still bothers him. He says his knee is giving out.     I personally reviewed and updated the  patient's allergies, medications, problem list, and past medical, surgical, social, and family history. I have reviewed and confirmed the accuracy of the History of Present Illness and Review of Symptoms as documented by the MA/LPN/RN. Judy Bassett MD    Allergies:  Allergies   Allergen Reactions   • Amoxicillin Swelling and Shortness Of Breath   • Cetirizine Swelling   • Diphenhydramine Swelling   • Doxycycline Swelling   • Escitalopram Swelling     Swelling of throat   • Fexofenadine Anaphylaxis   • Loratadine Swelling   • Montelukast Swelling   • Penicillins Shortness Of Breath   • Prednisone Swelling   • Trazodone Swelling   • Codeine Myalgia   • Latex Rash   • Mucinex [Guaifenesin Er] Cough   • Olanzapine Rash   • Robitussin (Alcohol Free) [Guaifenesin] Cough       Social History:  Social History     Socioeconomic History   • Marital status:    Tobacco Use   • Smoking status: Never   • Smokeless tobacco: Never   Vaping Use   • Vaping Use: Never used   Substance and Sexual Activity   • Alcohol use: Yes     Comment: Once in a great while maybe only once or twice a year   • Drug use: No   • Sexual activity: Defer       Family History:  Family History   Problem Relation Age of Onset   • Hypertension Mother    • Hyperlipidemia Mother    • Hyperlipidemia Maternal Grandmother    • Asthma Daughter    • Asthma Daughter        Past Medical History :  Patient Active Problem List   Diagnosis   • Perennial allergic rhinitis   • ADD (attention deficit disorder)   • Asperger's syndrome   • Mixed hyperlipidemia   • Sliding hiatal hernia   • Acute pain of right knee   • Anxiety   • Depression, major, recurrent, mild (HCC)   • Gastroesophageal reflux disease without esophagitis   • Hypothyroidism (acquired)   • Mild intermittent asthma without complication   • Labile mood   • Chronic idiopathic constipation   • Overweight   • Bilateral hearing loss   • Tinea cruris   • Medicare annual wellness visit, subsequent   •  Injury of left shoulder   • Vasovagal syncope   • Right leg pain   • Dizziness       Medication List:    Current Outpatient Medications:   •  albuterol sulfate  (90 Base) MCG/ACT inhaler, Inhale 2 puffs Every 4 (Four) Hours As Needed for Wheezing., Disp: 18 g, Rfl: 3  •  budesonide-formoterol (Symbicort) 80-4.5 MCG/ACT inhaler, Inhale 2 puffs 2 (Two) Times a Day., Disp: 6.9 g, Rfl: 12  •  busPIRone (BUSPAR) 15 MG tablet, Take 15 mg by mouth 2 (two) times a day., Disp: , Rfl:   •  EPINEPHrine (EPIPEN) 0.3 MG/0.3ML solution auto-injector injection, , Disp: , Rfl:   •  ibuprofen (ADVIL,MOTRIN) 800 MG tablet, Take 1 tablet by mouth Every 8 (Eight) Hours As Needed for Mild Pain  or Moderate Pain ., Disp: 90 tablet, Rfl: 0  •  lamoTRIgine (LaMICtal) 200 MG tablet, Take 1 tablet by mouth Daily., Disp: 30 tablet, Rfl: 12  •  metoprolol succinate XL (TOPROL-XL) 25 MG 24 hr tablet, Take 1 tablet by mouth Daily. Patient needs an appt before any more refills, Disp: 30 tablet, Rfl: 0  •  multivitamin with minerals tablet tablet, Take 1 tablet by mouth Daily., Disp: , Rfl:   •  nystatin (MYCOSTATIN) 966511 UNIT/GM cream, Apply  topically to the appropriate area as directed 2 (Two) Times a Day., Disp: , Rfl:   •  omeprazole (priLOSEC) 40 MG capsule, TAKE 1 CAPSULE BY MOUTH DAILY, Disp: 90 capsule, Rfl: 0  •  polyethylene glycol (MIRALAX) 17 GM/SCOOP powder, Take 17 g by mouth Daily., Disp: 225 g, Rfl: 2  •  pravastatin (PRAVACHOL) 10 MG tablet, Take 1 tablet by mouth Daily., Disp: 30 tablet, Rfl: 12  •  Triamcinolone Acetonide (NASACORT) 55 MCG/ACT nasal inhaler, 2 sprays into the nostril(s) as directed by provider Daily., Disp: , Rfl:     Past Surgical History:  Past Surgical History:   Procedure Laterality Date   • TONSILLECTOMY  1988         Physical Exam:      Vital Signs:    Vitals:    01/09/23 1542   BP: 120/70   Pulse: 94   Resp: 18   Temp: 98.2 °F (36.8 °C)   SpO2: 98%        Wt Readings from Last 3 Encounters:    01/09/23 77.6 kg (171 lb)   11/21/22 76.9 kg (169 lb 9.6 oz)   08/12/22 75.9 kg (167 lb 6.4 oz)       Result Review :                Physical Exam  Vitals reviewed.   Constitutional:       Appearance: Normal appearance. He is well-developed.   HENT:      Head: Normocephalic and atraumatic.      Right Ear: Tympanic membrane and external ear normal. No decreased hearing noted. No tenderness. No middle ear effusion. Tympanic membrane is not injected, erythematous, retracted or bulging.      Left Ear: Tympanic membrane and external ear normal. No decreased hearing noted. No tenderness.  No middle ear effusion. Tympanic membrane is not injected, erythematous, retracted or bulging.      Nose: Nose normal. No rhinorrhea.      Mouth/Throat:      Mouth: Mucous membranes are moist.      Pharynx: No oropharyngeal exudate or posterior oropharyngeal erythema.   Eyes:      General:         Right eye: No discharge.         Left eye: No discharge.      Extraocular Movements: Extraocular movements intact.      Pupils: Pupils are equal, round, and reactive to light.   Cardiovascular:      Rate and Rhythm: Normal rate and regular rhythm.      Heart sounds: Normal heart sounds. No murmur heard.    No friction rub. No gallop.   Pulmonary:      Effort: Pulmonary effort is normal. No respiratory distress.      Breath sounds: Normal breath sounds. No wheezing or rales.   Musculoskeletal:         General: Normal range of motion.      Cervical back: Normal range of motion.      Right knee: No swelling. Normal range of motion. No tenderness. No LCL laxity, MCL laxity, ACL laxity or PCL laxity.      Instability Tests: Anterior drawer test negative. Posterior drawer test negative.   Lymphadenopathy:      Cervical: No cervical adenopathy.   Skin:     General: Skin is warm and dry.      Findings: No rash.   Neurological:      General: No focal deficit present.      Mental Status: He is alert and oriented to person, place, and time.      Cranial  Nerves: No cranial nerve deficit.      Sensory: No sensory deficit.      Motor: No weakness.      Coordination: Coordination normal.      Gait: Gait normal.      Deep Tendon Reflexes: Reflexes normal.   Psychiatric:         Behavior: Behavior is cooperative.         Assessment and Plan:  Problems Addressed this Visit        Cardiac and Vasculature    Vasovagal syncope     He did not lose consciousness after questioning. He felt his knee give out and he fell. Sounds like some vasovagal. Will check labs    No focal deficits            Endocrine and Metabolic    Hypothyroidism (acquired) (Chronic)    Relevant Orders    TSH       Musculoskeletal and Injuries    Acute pain of right knee    Relevant Orders    XR Knee 4+ View Right (Completed)    Injury of left shoulder    Right leg pain - Primary       Symptoms and Signs    Dizziness     Increase fluids  Will get labs         Relevant Orders    CBC & Differential    Comprehensive Metabolic Panel   Diagnoses       Codes Comments    Right leg pain    -  Primary ICD-10-CM: M79.604  ICD-9-CM: 729.5     Injury of left shoulder, initial encounter     ICD-10-CM: S49.92XA  ICD-9-CM: 959.2     Vasovagal syncope     ICD-10-CM: R55  ICD-9-CM: 780.2     Dizziness     ICD-10-CM: R42  ICD-9-CM: 780.4     Hypothyroidism (acquired)     ICD-10-CM: E03.9  ICD-9-CM: 244.9     Acute pain of right knee     ICD-10-CM: M25.561  ICD-9-CM: 719.46            Dicussed if there is loss of vision, confusion, weakness or numbness in an extremity, dropping of an eyelid or one side of the face, severe pain, intractable vomiting, go to the ER      BMI is >= 25 and <30. (Overweight) The following options were offered after discussion;: weight loss educational material (shared in after visit summary), exercise counseling/recommendations and nutrition counseling/recommendations      An After Visit Summary and PPPS were given to the patient.       I wore protective equipment throughout this patient encounter  to include mask and eyewear. Hand hygiene was performed before donning protective equipment and after removal when leaving the room.

## 2023-01-09 ENCOUNTER — OFFICE VISIT (OUTPATIENT)
Dept: FAMILY MEDICINE CLINIC | Facility: CLINIC | Age: 38
End: 2023-01-09
Payer: MEDICARE

## 2023-01-09 ENCOUNTER — HOSPITAL ENCOUNTER (OUTPATIENT)
Dept: GENERAL RADIOLOGY | Facility: HOSPITAL | Age: 38
Discharge: HOME | End: 2023-01-09
Admitting: FAMILY MEDICINE
Payer: MEDICARE

## 2023-01-09 DIAGNOSIS — M79.604 RIGHT LEG PAIN: Primary | ICD-10-CM

## 2023-01-09 DIAGNOSIS — S49.92XA INJURY OF LEFT SHOULDER, INITIAL ENCOUNTER: ICD-10-CM

## 2023-01-09 DIAGNOSIS — E03.9 HYPOTHYROIDISM (ACQUIRED): ICD-10-CM

## 2023-01-09 DIAGNOSIS — M25.561 ACUTE PAIN OF RIGHT KNEE: ICD-10-CM

## 2023-01-09 DIAGNOSIS — R42 DIZZINESS: ICD-10-CM

## 2023-01-09 DIAGNOSIS — R55 VASOVAGAL SYNCOPE: ICD-10-CM

## 2023-01-09 PROBLEM — S09.93XA FACIAL INJURY: Status: ACTIVE | Noted: 2023-01-09

## 2023-01-09 PROBLEM — M77.11 LATERAL EPICONDYLITIS OF RIGHT ELBOW: Status: RESOLVED | Noted: 2021-08-05 | Resolved: 2023-01-09

## 2023-01-09 PROBLEM — S46.002S OSTEOARTHRITIS OF LEFT SHOULDER DUE TO ROTATOR CUFF INJURY: Status: ACTIVE | Noted: 2023-01-09

## 2023-01-09 PROBLEM — M19.112 OSTEOARTHRITIS OF LEFT SHOULDER DUE TO ROTATOR CUFF INJURY: Status: ACTIVE | Noted: 2023-01-09

## 2023-01-09 PROBLEM — M79.605 LEFT LEG PAIN: Status: RESOLVED | Noted: 2021-11-05 | Resolved: 2023-01-09

## 2023-01-09 PROBLEM — M79.676 PAIN OF FIFTH TOE: Status: RESOLVED | Noted: 2020-09-15 | Resolved: 2023-01-09

## 2023-01-09 PROCEDURE — 99214 OFFICE O/P EST MOD 30 MIN: CPT | Performed by: FAMILY MEDICINE

## 2023-01-09 PROCEDURE — 73564 X-RAY EXAM KNEE 4 OR MORE: CPT

## 2023-01-09 RX ORDER — METOPROLOL SUCCINATE 25 MG/1
25 TABLET, EXTENDED RELEASE ORAL DAILY
Qty: 90 TABLET | Refills: 3 | OUTPATIENT
Start: 2023-01-09

## 2023-01-09 RX ORDER — EPINEPHRINE 0.3 MG/.3ML
INJECTION SUBCUTANEOUS
COMMUNITY
Start: 2023-01-06

## 2023-01-10 RX ORDER — METOPROLOL SUCCINATE 25 MG/1
25 TABLET, EXTENDED RELEASE ORAL DAILY
Qty: 90 TABLET | Refills: 3 | OUTPATIENT
Start: 2023-01-10

## 2023-01-11 RX ORDER — METOPROLOL SUCCINATE 25 MG/1
25 TABLET, EXTENDED RELEASE ORAL DAILY
Qty: 90 TABLET | Refills: 3 | Status: CANCELLED | OUTPATIENT
Start: 2023-01-11

## 2023-01-11 NOTE — TELEPHONE ENCOUNTER
Caller: Sourav Tamez    Relationship: Self    Best call back number: [phone]    Requested Prescriptions:   Requested Prescriptions     Pending Prescriptions Disp Refills   • metoprolol succinate XL (TOPROL-XL) 25 MG 24 hr tablet 90 tablet 3     Sig: Take 1 tablet by mouth Daily.        Pharmacy where request should be sent: [id number] - [place] [address] [address] AT [facility]  & [facility] - [phone] [facility] [phone] FX     Additional details provided by patient: PT HAS 3 DAY SUPPLY LEFT. HAS AN UPCOMING APPT WITH DR. HIGH IN [place] IN MARCH.     Does the patient have less than a 3 day supply:  [x] Yes  [] No    Would you like a call back once the refill request has been completed: [x] Yes [] No    If the office needs to give you a call back, can they leave a voicemail: [x] Yes [] No    Ehsan Jiang Rep   01/11/23 11:55 EST

## 2023-01-12 RX ORDER — METOPROLOL SUCCINATE 25 MG/1
25 TABLET, EXTENDED RELEASE ORAL DAILY
Qty: 30 TABLET | Refills: 0 | Status: SHIPPED | OUTPATIENT
Start: 2023-01-12 | End: 2023-02-10 | Stop reason: SDUPTHER

## 2023-01-13 RX ORDER — METOPROLOL SUCCINATE 25 MG/1
TABLET, EXTENDED RELEASE ORAL
Qty: 90 TABLET | OUTPATIENT
Start: 2023-01-13

## 2023-01-16 NOTE — PROGRESS NOTES
Subjective   Sourav Tamez is a 38 y.o. male. Presents to [facility]    Chief Complaint   Patient presents with   • Loss of Consciousness       History of Present Illness  Syncope:   The syncopal episode occurred while the patient was standing, 3 weeks ago. The fainting episode followed leg giving out. Prodromal symptoms included none. History was given by the patient. The patient gets a sudden loss of consciousness without warning. There has been no more syncope episodes since last visit on 01/09/2023. Preventive factors include none. Associated symptoms included no other pertinent symptoms. Current treatment includes adding lower the sodium intake and increase fluids.      No further vaso vagal issues. He has been drinking more fluids.     I personally reviewed and updated the patient's allergies, medications, problem list, and past medical, surgical, social, and family history. I have reviewed and confirmed the accuracy of the History of Present Illness and Review of Symptoms as documented by the MA/LPN/RN. Judy Bassett MD    Allergies:  Allergies   Allergen Reactions   • Amoxicillin Swelling and Shortness Of Breath   • Cetirizine Swelling   • Diphenhydramine Swelling   • Doxycycline Swelling   • Escitalopram Swelling     Swelling of throat   • Fexofenadine Anaphylaxis   • Loratadine Swelling   • Montelukast Swelling   • Penicillins Shortness Of Breath   • Prednisone Swelling   • Trazodone Swelling   • Codeine Myalgia   • Latex Rash   • Mucinex [Guaifenesin Er] Cough   • Olanzapine Rash   • Robitussin (Alcohol Free) [Guaifenesin] Cough       Social History:  Social History     Socioeconomic History   • Marital status:    Tobacco Use   • Smoking status: Never   • Smokeless tobacco: Never   Vaping Use   • Vaping Use: Never used   Substance and Sexual Activity   • Alcohol use: Yes     Comment: Once in a great while maybe only once or twice a year   • Drug use: No   • Sexual activity:  Defer       Family History:  Family History   Problem Relation Age of Onset   • Hypertension Mother    • Hyperlipidemia Mother    • Hyperlipidemia Maternal Grandmother    • Asthma Daughter    • Asthma Daughter        Past Medical History :  Patient Active Problem List   Diagnosis   • Perennial allergic rhinitis   • ADD (attention deficit disorder)   • Asperger's syndrome   • Mixed hyperlipidemia   • Sliding hiatal hernia   • Acute pain of right knee   • Anxiety   • Depression, major, recurrent, mild (HCC)   • Gastroesophageal reflux disease without esophagitis   • Hypothyroidism (acquired)   • Mild intermittent asthma without complication   • Labile mood   • Chronic idiopathic constipation   • Overweight with body mass index (BMI) of 27 to 27.9 in adult   • Bilateral hearing loss   • Tinea cruris   • Medicare annual wellness visit, subsequent   • Injury of left shoulder   • Vasovagal syncope   • Right leg pain   • Dizziness       Medication List:    Current Outpatient Medications:   •  albuterol sulfate  (90 Base) MCG/ACT inhaler, Inhale 2 puffs Every 4 (Four) Hours As Needed for Wheezing., Disp: 18 g, Rfl: 3  •  budesonide-formoterol (Symbicort) 80-4.5 MCG/ACT inhaler, Inhale 2 puffs 2 (Two) Times a Day., Disp: 6.9 g, Rfl: 12  •  busPIRone (BUSPAR) 15 MG tablet, Take 15 mg by mouth 2 (two) times a day., Disp: , Rfl:   •  EPINEPHrine (EPIPEN) 0.3 MG/0.3ML solution auto-injector injection, , Disp: , Rfl:   •  ibuprofen (ADVIL,MOTRIN) 800 MG tablet, Take 1 tablet by mouth Every 8 (Eight) Hours As Needed for Mild Pain or Moderate Pain., Disp: 90 tablet, Rfl: 0  •  lamoTRIgine (LaMICtal) 200 MG tablet, Take 1 tablet by mouth Daily., Disp: 30 tablet, Rfl: 12  •  metoprolol succinate XL (TOPROL-XL) 25 MG 24 hr tablet, Take 1 tablet by mouth Daily. Patient needs an appt before any more refills, Disp: 30 tablet, Rfl: 0  •  multivitamin with minerals tablet tablet, Take 1 tablet by mouth Daily., Disp: , Rfl:   •   nystatin (MYCOSTATIN) 400277 UNIT/GM cream, Apply  topically to the appropriate area as directed 2 (Two) Times a Day., Disp: , Rfl:   •  omeprazole (priLOSEC) 40 MG capsule, TAKE 1 CAPSULE BY MOUTH DAILY, Disp: 90 capsule, Rfl: 0  •  polyethylene glycol (MIRALAX) 17 GM/SCOOP powder, Take 17 g by mouth Daily., Disp: 225 g, Rfl: 2  •  pravastatin (PRAVACHOL) 10 MG tablet, Take 1 tablet by mouth Daily., Disp: 30 tablet, Rfl: 12  •  Triamcinolone Acetonide (NASACORT) 55 MCG/ACT nasal inhaler, 2 sprays into the nostril(s) as directed by provider Daily., Disp: , Rfl:     Past Surgical History:  Past Surgical History:   Procedure Laterality Date   • TONSILLECTOMY  1988         Physical Exam:      Vital Signs:    Vitals:    01/19/23 1633   BP: 110/82   Pulse: 68   Resp: 18   Temp: 98 °F (36.7 °C)   SpO2: 98%        Wt Readings from Last 3 Encounters:   01/19/23 78.7 kg (173 lb 9.6 oz)   01/09/23 77.6 kg (171 lb)   11/21/22 76.9 kg (169 lb 9.6 oz)       Result Review :   The following data was reviewed by: Judy Bassett MD on 01/19/2023:            Latest Reference Range & Units 01/18/23 12:45   Glucose 70 - 99 mg/dL 99   Sodium 134 - 144 mmol/L 140   Potassium 3.5 - 5.2 mmol/L 4.5   CO2 20 - 29 mmol/L 26   Chloride 96 - 106 mmol/L 102   Creatinine 0.76 - 1.27 mg/dL 0.87   BUN 6 - 24 mg/dL 12   BUN/Creatinine Ratio 9 - 20  14   Calcium 8.7 - 10.2 mg/dL 9.0   EGFR Result >59 mL/min/1.73 112   Alkaline Phosphatase 44 - 121 IU/L 63   Total Protein 6.0 - 8.5 g/dL 6.6   ALT (SGPT) 0 - 44 IU/L 33   AST (SGOT) 0 - 40 IU/L 21   Total Bilirubin 0.0 - 1.2 mg/dL 0.3   Albumin 4.0 - 5.0 g/dL 4.8   A/G Ratio 1.2 - 2.2  2.7 (H)   TSH Baseline 0.450 - 4.500 uIU/mL 3.740   Globulin 1.5 - 4.5 g/dL 1.8   WBC 3.4 - 10.8 x10E3/uL 5.1   RBC 4.14 - 5.80 x10E6/uL 5.25   Hemoglobin 13.0 - 17.7 g/dL 16.2   Hematocrit 37.5 - 51.0 % 45.8   RDW 11.6 - 15.4 % 12.4   MCV 79 - 97 fL 87   MCH 26.6 - 33.0 pg 30.9   MCHC 31.5 - 35.7 g/dL 35.4   Platelets  150 - 450 x10E3/uL 196   Neutrophil Rel % Not Estab. % 64   Lymphocyte Rel % Not Estab. % 28   Monocyte Rel % Not Estab. % 7   Eosinophil Rel % Not Estab. % 1   Basophil Rel % Not Estab. % 0   Immature Granulocyte Rel % Not Estab. % 0   Neutrophils Absolute 1.4 - 7.0 x10E3/uL 3.3   Lymphocytes Absolute 0.7 - 3.1 x10E3/uL 1.4   Monocytes Absolute 0.1 - 0.9 x10E3/uL 0.3   Eosinophils Absolute 0.0 - 0.4 x10E3/uL 0.1   Basophils Absolute 0.0 - 0.2 x10E3/uL 0.0   Immature Grans, Absolute 0.0 - 0.1 x10E3/uL 0.0   (H): Data is abnormally high  XR Knee 4+ View Right    Result Date: 1/9/2023  Impression: 1. No acute osseous abnormality of the right knee. 2. Early enthesopathy at the superior and inferior aspect of the patella. Electronically Signed: Jeff French  1/9/2023 5:00 PM EST  Workstation ID: [id number]        Physical Exam  Vitals reviewed.   Constitutional:       Appearance: Normal appearance. He is well-developed.   HENT:      Head: Normocephalic and atraumatic.   Eyes:      General:         Right eye: No discharge.         Left eye: No discharge.   Cardiovascular:      Rate and Rhythm: Normal rate and regular rhythm.      Heart sounds: Normal heart sounds. No murmur heard.    No friction rub. No gallop.   Pulmonary:      Effort: Pulmonary effort is normal. No respiratory distress.      Breath sounds: Normal breath sounds. No wheezing or rales.   Musculoskeletal:      Right knee: No swelling, ecchymosis or crepitus. Normal range of motion. No LCL laxity, MCL laxity, ACL laxity or PCL laxity.      Instability Tests: Anterior drawer test negative. Posterior drawer test negative.   Skin:     General: Skin is warm and dry.      Findings: No rash.   Neurological:      General: No focal deficit present.      Mental Status: He is alert and oriented to person, place, and time.      Motor: No weakness.      Coordination: Coordination normal.      Gait: Gait normal.   Psychiatric:         Behavior: Behavior is cooperative.          Assessment and Plan:  Problems Addressed this Visit        Cardiac and Vasculature    Vasovagal syncope - Primary     Resolved    Dicussed if there is loss of vision, confusion, weakness or numbness in an extremity, dropping of an eyelid or one side of the face, severe pain, intractable vomiting, go to the ER              Musculoskeletal and Injuries    Acute pain of right knee     Still there. Place in PT, referral to Ortho         Relevant Medications    ibuprofen (ADVIL,MOTRIN) 800 MG tablet    Other Relevant Orders    Ambulatory Referral to Orthopedic Surgery    Ambulatory Referral to Physical Therapy Evaluate and treat       Other    Overweight with body mass index (BMI) of 27 to 27.9 in adult     Patient's (Body mass index is 28.02 kg/m².) indicates that they are overweight with health conditions that include dyslipidemias . Weight is worsening. BMI is is above average; BMI management plan is completed. We discussed portion control and increasing exercise.         Diagnoses       Codes Comments    Vasovagal syncope    -  Primary ICD-10-CM: R55  ICD-9-CM: 780.2     Acute pain of right knee     ICD-10-CM: M25.561  ICD-9-CM: 719.46     Overweight with body mass index (BMI) of 27 to 27.9 in adult     ICD-10-CM: E66.3, Z68.27  ICD-9-CM: 278.02, V85.23            BMI is >= 25 and <30. (Overweight) The following options were offered after discussion;: weight loss educational material (shared in after visit summary), exercise counseling/recommendations and nutrition counseling/recommendations      An After Visit Summary and PPPS were given to the patient.       I wore protective equipment throughout this patient encounter to include mask and eyewear. Hand hygiene was performed before donning protective equipment and after removal when leaving the room.

## 2023-01-18 VITALS
SYSTOLIC BLOOD PRESSURE: 120 MMHG | HEIGHT: 66 IN | RESPIRATION RATE: 18 BRPM | OXYGEN SATURATION: 98 % | DIASTOLIC BLOOD PRESSURE: 70 MMHG | BODY MASS INDEX: 27.48 KG/M2 | TEMPERATURE: 98.2 F | HEART RATE: 94 BPM | WEIGHT: 171 LBS

## 2023-01-18 PROBLEM — S09.93XA FACIAL INJURY: Status: RESOLVED | Noted: 2023-01-09 | Resolved: 2023-01-18

## 2023-01-18 NOTE — ASSESSMENT & PLAN NOTE
He did not lose consciousness after questioning. He felt his knee give out and he fell. Sounds like some vasovagal. Will check labs    No focal deficits

## 2023-01-19 ENCOUNTER — OFFICE VISIT (OUTPATIENT)
Dept: FAMILY MEDICINE CLINIC | Facility: CLINIC | Age: 38
End: 2023-01-19
Payer: MEDICARE

## 2023-01-19 VITALS
TEMPERATURE: 98 F | OXYGEN SATURATION: 98 % | RESPIRATION RATE: 18 BRPM | DIASTOLIC BLOOD PRESSURE: 82 MMHG | HEART RATE: 68 BPM | HEIGHT: 66 IN | BODY MASS INDEX: 27.9 KG/M2 | SYSTOLIC BLOOD PRESSURE: 110 MMHG | WEIGHT: 173.6 LBS

## 2023-01-19 DIAGNOSIS — E66.3 OVERWEIGHT WITH BODY MASS INDEX (BMI) OF 27 TO 27.9 IN ADULT: ICD-10-CM

## 2023-01-19 DIAGNOSIS — R55 VASOVAGAL SYNCOPE: Primary | ICD-10-CM

## 2023-01-19 DIAGNOSIS — M25.561 ACUTE PAIN OF RIGHT KNEE: ICD-10-CM

## 2023-01-19 LAB
ALBUMIN SERPL-MCNC: 4.8 G/DL (ref 4–5)
ALBUMIN/GLOB SERPL: 2.7 {RATIO} (ref 1.2–2.2)
ALP SERPL-CCNC: 63 IU/L (ref 44–121)
ALT SERPL-CCNC: 33 IU/L (ref 0–44)
AST SERPL-CCNC: 21 IU/L (ref 0–40)
BASOPHILS # BLD AUTO: 0 X10E3/UL (ref 0–0.2)
BASOPHILS NFR BLD AUTO: 0 %
BILIRUB SERPL-MCNC: 0.3 MG/DL (ref 0–1.2)
BUN SERPL-MCNC: 12 MG/DL (ref 6–24)
BUN/CREAT SERPL: 14 (ref 9–20)
CALCIUM SERPL-MCNC: 9 MG/DL (ref 8.7–10.2)
CHLORIDE SERPL-SCNC: 102 MMOL/L (ref 96–106)
CO2 SERPL-SCNC: 26 MMOL/L (ref 20–29)
CREAT SERPL-MCNC: 0.87 MG/DL (ref 0.76–1.27)
EGFRCR SERPLBLD CKD-EPI 2021: 112 ML/MIN/1.73
EOSINOPHIL # BLD AUTO: 0.1 X10E3/UL (ref 0–0.4)
EOSINOPHIL NFR BLD AUTO: 1 %
ERYTHROCYTE [DISTWIDTH] IN BLOOD BY AUTOMATED COUNT: 12.4 % (ref 11.6–15.4)
GLOBULIN SER CALC-MCNC: 1.8 G/DL (ref 1.5–4.5)
GLUCOSE SERPL-MCNC: 99 MG/DL (ref 70–99)
HCT VFR BLD AUTO: 45.8 % (ref 37.5–51)
HGB BLD-MCNC: 16.2 G/DL (ref 13–17.7)
IMM GRANULOCYTES # BLD AUTO: 0 X10E3/UL (ref 0–0.1)
IMM GRANULOCYTES NFR BLD AUTO: 0 %
LYMPHOCYTES # BLD AUTO: 1.4 X10E3/UL (ref 0.7–3.1)
LYMPHOCYTES NFR BLD AUTO: 28 %
MCH RBC QN AUTO: 30.9 PG (ref 26.6–33)
MCHC RBC AUTO-ENTMCNC: 35.4 G/DL (ref 31.5–35.7)
MCV RBC AUTO: 87 FL (ref 79–97)
MONOCYTES # BLD AUTO: 0.3 X10E3/UL (ref 0.1–0.9)
MONOCYTES NFR BLD AUTO: 7 %
NEUTROPHILS # BLD AUTO: 3.3 X10E3/UL (ref 1.4–7)
NEUTROPHILS NFR BLD AUTO: 64 %
PLATELET # BLD AUTO: 196 X10E3/UL (ref 150–450)
POTASSIUM SERPL-SCNC: 4.5 MMOL/L (ref 3.5–5.2)
PROT SERPL-MCNC: 6.6 G/DL (ref 6–8.5)
RBC # BLD AUTO: 5.25 X10E6/UL (ref 4.14–5.8)
SODIUM SERPL-SCNC: 140 MMOL/L (ref 134–144)
TSH SERPL DL<=0.005 MIU/L-ACNC: 3.74 UIU/ML (ref 0.45–4.5)
WBC # BLD AUTO: 5.1 X10E3/UL (ref 3.4–10.8)

## 2023-01-19 PROCEDURE — 99213 OFFICE O/P EST LOW 20 MIN: CPT | Performed by: FAMILY MEDICINE

## 2023-01-19 RX ORDER — IBUPROFEN 800 MG/1
800 TABLET ORAL EVERY 8 HOURS PRN
Qty: 90 TABLET | Refills: 0 | Status: SHIPPED | OUTPATIENT
Start: 2023-01-19

## 2023-01-19 NOTE — ASSESSMENT & PLAN NOTE
Patient's (Body mass index is 28.02 kg/m².) indicates that they are overweight with health conditions that include dyslipidemias . Weight is worsening. BMI is is above average; BMI management plan is completed. We discussed portion control and increasing exercise.

## 2023-01-22 NOTE — ASSESSMENT & PLAN NOTE
Resolved    Dicussed if there is loss of vision, confusion, weakness or numbness in an extremity, dropping of an eyelid or one side of the face, severe pain, intractable vomiting, go to the ER

## 2023-01-24 ENCOUNTER — TELEPHONE (OUTPATIENT)
Dept: ORTHOPEDIC SURGERY | Facility: CLINIC | Age: 38
End: 2023-01-24

## 2023-01-24 NOTE — TELEPHONE ENCOUNTER
Caller: PATIENT    Relationship to patient: SELF     Best call back number: [phone]    Patient is needing: PATIENT HAD A REFERRAL TO BE SEEN AT YOUR OFFICE FOR A NEW PROBLEM, ACUTE PAIN OF RIGHT KNEE AND CALLED TO SCHEDULE AN APPT. THE REFERRAL COMMUNICATION STATED TO SCHEDULE THE FIRST AVAILABLE APPT WITH DR. OLGUIN OR Magda BRITTANY. I ATTEMPTED TO WARM TRANSFER CALL TO THE OFFICE SINCE I WAS UNABLE TO SCHEDULE A NEW PROBLEM APPT WITH MR. SOTO BUT I RECEIVED NO ANSWER. I WENT AHEAD AND SCHEDULED THE APPT AS A NEW PATIENT AND NOTATED IN THE APPT NOTES IT WAS FOR A NEW PROBLEM. PATIENT IS TAKEN CARE OF BUT I WANTED TO LET YOU ALL KNOW I WAS UNABLE TO SCHEDULE AS A NEW PROBLEM DUE TO A TEMPLATE ISSUE. THANK YOU!

## 2023-02-02 ENCOUNTER — OFFICE VISIT (OUTPATIENT)
Dept: ORTHOPEDIC SURGERY | Facility: CLINIC | Age: 38
End: 2023-02-02
Payer: MEDICARE

## 2023-02-02 VITALS — OXYGEN SATURATION: 98 % | BODY MASS INDEX: 27.97 KG/M2 | WEIGHT: 174 LBS | HEIGHT: 66 IN

## 2023-02-02 DIAGNOSIS — M25.521 RIGHT ELBOW PAIN: ICD-10-CM

## 2023-02-02 DIAGNOSIS — M76.899 ENTHESOPATHY, KNEE: ICD-10-CM

## 2023-02-02 DIAGNOSIS — M77.11 RIGHT LATERAL EPICONDYLITIS: Primary | ICD-10-CM

## 2023-02-02 PROCEDURE — 99213 OFFICE O/P EST LOW 20 MIN: CPT | Performed by: PHYSICIAN ASSISTANT

## 2023-02-02 NOTE — PROGRESS NOTES
"Sourav is a 38 y.o. year old male presents to [facility]    Chief Complaint   Patient presents with   • Right Knee - Pain, Initial Evaluation     Pain 0/10   • Right Elbow - Initial Evaluation, Pain     Pain 0/10       History of Present Illness  Sourav Tamez is a 38 y.o. male presents to clinic for evaluation of right knee pain that has been present for at least 4 months. Denies any trauma to the knee. Reports pain with standing from a squatting/kneeling position. Describes pain along the latera & medial aspects and 'feels like the knee cap wants to go out and back of the knee in\".     Secondary complaint of right elbow pain that has been present since 2021 that resolved after a course of PT prescribed by Dr Davenport. However, this returned about a month ago.     I have reviewed the patient's medical, family, and social history in detail and updated the computerized patient record.    Objective:  Ht 167.6 cm (66\")   Wt 78.9 kg (174 lb)   SpO2 98%   BMI 28.08 kg/m²      Physical Exam    Mask worn throughout the encounter  Vital signs reviewed.   General: No acute distress.  Eyes: conjunctiva clear  ENT: external ears atraumatic  CV: no peripheral edema  Resp: normal respiratory effort  Skin: no rashes or wounds; normal turgor  Psych: mood and affect appropriate; recent and remote memory intact  Neuro: sensation to light touch intact    MSK Exam  Right knee:  Intact skin. No apparent effusion. NO excessive warmth.  Negative posterior drawer.  No joint line tenderness.  NO tenderness of the MCL nor LCL  Negative retropatellar crepitus  No varus nor valgus laxity    Right elbow:  Intact skin. No effusion.  Tenderness over the lateral epicondyle.  Negative for pain with resisted wrist extension nor resisted 3rd digit extension    Full ROM at the elbow, wrist and fingers.   strength 5/5  Sensation to light touch intact to all digits.  Radial pulse 2+  capillary refill less than 2 seconds " to all digits.     Imaging:  XR Knee 4+ View Right (01/09/2023 16:06)  Findings:  There is no acute fracture or dislocation. The joint spaces are normally aligned and well maintained. There is no osseous erosion. There is no joint effusion. There is early enthesopathy at the distal quadriceps tendon insertion on the patella and at the   inferior aspect of the patella at the patellar tendon origin.     IMPRESSION:  Impression:  1. No acute osseous abnormality of the right knee.  2. Early enthesopathy at the superior and inferior aspect of the patella.    XR Elbow 2 View Right (02/02/2023 08:14)  Findings:  No fracture. No joint malalignment. Minimal spurring of the posterior olecranon at the triceps tendon insertion.. No joint effusion. No osteolytic or osteoblastic abnormality. No retained radiopaque foreign body.     IMPRESSION:  Impression:  No acute right elbow abnormality. No significant change from 8/17/2021.    Assessment:  Diagnoses and all orders for this visit:    Right lateral epicondylitis    Enthesopathy, knee    Right elbow pain  -     XR Elbow 2 View Right      Plan: Recommend activity modification to include avoiding stairs, ladders and climbing to reduce the right knee enthesopathy for the next 8 to 12 weeks before resuming normal activity.  With regards to the right elbow: I recommend returning to PT for right lateral epicondylitis. Follow up as needed.       Follow Up   Return if symptoms worsen or fail to improve.  Patient was given instructions and counseling regarding his condition or for health maintenance advice. Please see specific information pulled into the AVS if appropriate.     EMR Dragon/Transcription disclaimer:    Much of this encounter note is an electronic transcription/translation of spoken language to printed text.  The electronic translation of spoken language may permit erroneous, or at times, nonsensical words or phrases to be inadvertently transcribed.  Although I have  reviewed the note for such errors some may still exist.

## 2023-02-13 RX ORDER — METOPROLOL SUCCINATE 25 MG/1
TABLET, EXTENDED RELEASE ORAL
Qty: 90 TABLET | Refills: 1 | Status: SHIPPED | OUTPATIENT
Start: 2023-02-13 | End: 2023-03-17 | Stop reason: SDUPTHER

## 2023-02-13 RX ORDER — METOPROLOL SUCCINATE 25 MG/1
25 TABLET, EXTENDED RELEASE ORAL DAILY
Qty: 30 TABLET | Refills: 0 | Status: SHIPPED | OUTPATIENT
Start: 2023-02-13 | End: 2023-02-13

## 2023-02-21 ENCOUNTER — TREATMENT (OUTPATIENT)
Dept: PHYSICAL THERAPY | Facility: CLINIC | Age: 38
End: 2023-02-21
Payer: MEDICARE

## 2023-02-21 DIAGNOSIS — M25.521 RIGHT ELBOW PAIN: ICD-10-CM

## 2023-02-21 DIAGNOSIS — M77.11 LATERAL EPICONDYLITIS OF RIGHT ELBOW: Primary | ICD-10-CM

## 2023-02-21 DIAGNOSIS — M25.561 ACUTE PAIN OF RIGHT KNEE: ICD-10-CM

## 2023-02-21 DIAGNOSIS — R29.898 WEAKNESS OF RIGHT ARM: ICD-10-CM

## 2023-02-21 DIAGNOSIS — R29.898 WEAKNESS OF RIGHT LEG: ICD-10-CM

## 2023-02-21 PROCEDURE — 97110 THERAPEUTIC EXERCISES: CPT | Performed by: PHYSICAL THERAPIST

## 2023-02-21 PROCEDURE — 97162 PT EVAL MOD COMPLEX 30 MIN: CPT | Performed by: PHYSICAL THERAPIST

## 2023-02-21 NOTE — PROGRESS NOTES
Physical Therapy Initial Evaluation and Plan of Care     [address] [place] [address] [place] [address]    Patient: Sourav Tamez   : 1985  Diagnosis/ICD-10 Code:  Lateral epicondylitis of right elbow [M77.11]  Referring practitioner: Martin Norris PA-C  Date of Initial Visit: 2023  Today's Date: 2023  Patient seen for 1 sessions           Subjective Questionnaire: PT Functional Test: Quick DASH = 20% impairment; Oxford knee score 38/48, indicating 79% ability and 21% impairment      Subjective Evaluation    History of Present Illness  Mechanism of injury: Pt reports R elbow pain returned ~2 months ago. States it went away with PT last year. States pain is not constant. Reports elbow gets stiff and aches when he does not move it. Mild pain in R elbow with lifting. Taking ibuprofen prn and it seems to help. Has not tried ice or heat.    Pt also with c/o R knee pain that started as mild pain in 2022 after they moved. States they are in 1st floor apt with vinyl rob with concrete underneath (prior apt was all carpet with padding). States L knee has started to hurt a little too. R knee pain is most prominent with standing and walking. Went to a concert over the weekend and had to stand the whole time and shift wt from one leg to the other. States he feels knots in his front and inside of thighs at time. Knee pain wakes him at nights. States at times he feels like his top leg is going to go the side and lower leg move inward. R knee pops at times if it is bent too long. States his knees feel tight and swollen on the inside when he stands for a long time. Pt reports he had a injury to R lower leg in  when a table fell over and hit the front of his leg. States he fractured his leg. It is all healed but is very sensitive to touch.      Patient Occupation: disabled Quality of life: good    Pain  Current pain rating: 3  At best pain ratin  At worst pain ratin (R knee pain at worst  = 4/10)  Location: R elbow  Quality: dull ache and discomfort  Relieving factors: medications  Aggravating factors: ambulation, standing, lifting and repetitive movement  Progression: worsening    Social Support  Lives in: apartment  Lives with: spouse and adult children (19 and 15 yo sons)    Hand dominance: right    Treatments  Previous treatment: physical therapy  Patient Goals  Patient goals for therapy: decreased edema, decreased pain, increased motion, increased strength and return to sport/leisure activities             Objective          Observations     Additional Knee Observation Details  Gait: R hip in mild external rotation with foot turned outward during ambulation. Decreased R heel strip and toe off.     Palpation     Right   Muscle spasm in the brachioradialis. Tenderness of the brachioradialis and vastus medialis.     Tenderness     Right Elbow   Tenderness in the lateral epicondyle.     Right Wrist/Hand   Tenderness in the lateral epicondyle.     Right Knee   Tenderness in the medial joint line and medial patella.     Active Range of Motion     Left Elbow   Normal active range of motion    Right Elbow   Normal active range of motion    Additional Active Range of Motion Details  B shoulder and wrist AROM WNL.    Patellar Mobility     Right Knee Hypomobile in the medial, lateral, superior and inferior patellar tendon(s).     Strength/Myotome Testing     Left Elbow   Flexion: 4+  Extension: 4+  Forearm supination: 5  Forearm pronation: 5    Right Elbow   Flexion: 4  Extension: 4  Forearm supination: 4+  Forearm pronation: 4+    Left Wrist/Hand      (2nd hand position)     Trial 1: 70 lbs    Trial 2: 75 lbs    Trial 3: 80 lbs    Average: 75 lbs    Right Wrist/Hand      (2nd hand position)     Trial 1: 75 lbs    Trial 2: 75 lbs    Trial 3: 65 lbs    Average: 71.67 lbs    Left Hip   Planes of Motion   Flexion: 4+  Abduction: 5    Right Hip   Planes of Motion   Flexion: 4  Abduction: 4    Left Knee    Flexion: 5  Extension: 5    Right Knee   Flexion: 4+  Extension: 4    Additional Strength Details  B shoulder and wrist grossly 4+/5.  B ankle DF = 5/5    Left Knee Flexibility Comments:   Minimal tightness L quad.    Right Knee Flexibility Comments:   Moderate tightness R quad.          Assessment & Plan     Assessment  Impairments: abnormal coordination, abnormal gait, abnormal muscle firing, abnormal or restricted ROM, activity intolerance, impaired physical strength, lacks appropriate home exercise program, pain with function and weight-bearing intolerance  Functional Limitations: carrying objects, lifting, walking, pulling, pushing, uncomfortable because of pain, standing and unable to perform repetitive tasks  Assessment details: Pt is 39 yo male with recurrence of R lateral epicondylitis. Pt presents with R elbow weakness. Pt is having difficulty with pain and stiffness when arm is still for prolonged time. Difficulty sleeping. Difficulty with increased pain with lifting. Pt also with c/o R knee pain. Pt with tightness of R quad and weakness of R knee and hip. Pt is having increased pain and difficulty with standing. Difficulty with walking >1 hr. Quick DASH indicates 20% impairment and Oxford Knee score indicates 21% impairment.    Patient presents with the impairments listed above and based on the objective findings and the physical therapy evaluation, the patient's condition has the potential to improve in response to therapy.   The patient's condition and/or services required are at a level of complexity that necessitates the skill & supervision of a physical therapist.    Prognosis: good    Goals  Plan Goals: STG to be met in 3 wk:  - Pt to have no tenderness to palpation R brachioradialis.  - Pt to report 25% improvement in ability to carry groceries.  - PT to assess SLS in 3-4 visits.  - Pt to be independent with HEP.  LTG to be met in 12 wk:  - Improve Quick DASH to 10% or less impairment.  -  Improve Oxford Knee score to 15% or less impairment.  - Increase R elbow flex and ext strength to 4+/5 in order to lift heavier items.  - Increase R hip flex and abd strength to 4+/5 in order to assist with grocery shopping with less R knee pain.    Plan  Therapy options: will be seen for skilled therapy services  Planned modality interventions: electrical stimulation/Russian stimulation, thermotherapy (hydrocollator packs) and ultrasound  Planned therapy interventions: balance/weight-bearing training, body mechanics training, fine motor coordination training, flexibility, functional ROM exercises, home exercise program, joint mobilization, manual therapy, motor coordination training, soft tissue mobilization, strengthening, stretching, therapeutic activities, neuromuscular re-education, gait training and transfer training  Frequency: 2x week  Duration in weeks: 12  Treatment plan discussed with: patient        Timed:         Manual Therapy:         mins  32621;     Therapeutic Exercise:    15     mins  46251;     Neuromuscular Tabitha:        mins  97824;    Therapeutic Activity:          mins  78100;     Gait Training:           mins  56451;     Ultrasound:          mins  39725;    Ionto                                   mins   46360  Self - Care                          mins  36161    Un-Timed:  Electrical Stimulation:         mins  22341 ( );  Dry Needling          20561/20560  Traction          mins 11175  Can Repos          mins 11425  Low Eval          Mins  14907  Mod Eval     30     Mins  12098  High Eval                            Mins  58295      Timed Treatment:   15   mins   Total Treatment:     45   mins      PT SIGNATURE: Zo Jim PT, CLT  [place] Lic # [id number]  Electronically signed by Zo Jim PT, 02/21/23, 4:24 PM EST    Medicare Initial Certification  Certification Period: 2/21/2023 thru 5/21/2023  I certify that the therapy services are furnished while this patient is under my  care.  The services outlined above are required by this patient, and will be reviewed every 90 days.     PHYSICIAN: Martin Norris PA-C _____________________________________________________  NPI: [id number]                                      DATE:    Please sign and return via fax to [phone]. Thank you, [facility].

## 2023-02-22 RX ORDER — NYSTATIN 100000 U/G
CREAM TOPICAL
Qty: 60 G | Refills: 0 | Status: SHIPPED | OUTPATIENT
Start: 2023-02-22

## 2023-03-07 DIAGNOSIS — F33.0 DEPRESSION, MAJOR, RECURRENT, MILD: Chronic | ICD-10-CM

## 2023-03-07 RX ORDER — LAMOTRIGINE 200 MG/1
200 TABLET ORAL DAILY
Qty: 30 TABLET | Refills: 1 | Status: SHIPPED | OUTPATIENT
Start: 2023-03-07

## 2023-03-08 ENCOUNTER — TREATMENT (OUTPATIENT)
Dept: PHYSICAL THERAPY | Facility: CLINIC | Age: 38
End: 2023-03-08
Payer: MEDICARE

## 2023-03-08 DIAGNOSIS — M25.561 ACUTE PAIN OF RIGHT KNEE: ICD-10-CM

## 2023-03-08 DIAGNOSIS — M25.521 RIGHT ELBOW PAIN: ICD-10-CM

## 2023-03-08 DIAGNOSIS — R29.898 WEAKNESS OF RIGHT ARM: ICD-10-CM

## 2023-03-08 DIAGNOSIS — M77.11 LATERAL EPICONDYLITIS OF RIGHT ELBOW: Primary | ICD-10-CM

## 2023-03-08 PROCEDURE — 97110 THERAPEUTIC EXERCISES: CPT | Performed by: PHYSICAL THERAPIST

## 2023-03-08 PROCEDURE — 97140 MANUAL THERAPY 1/> REGIONS: CPT | Performed by: PHYSICAL THERAPIST

## 2023-03-08 PROCEDURE — 97035 APP MDLTY 1+ULTRASOUND EA 15: CPT | Performed by: PHYSICAL THERAPIST

## 2023-03-08 NOTE — PROGRESS NOTES
Physical Therapy Daily Treatment Note      Patient: Sourav Tamez   : 1985  Diagnosis/ICD-10 Code:  Lateral epicondylitis of right elbow [M77.11]  Referring practitioner: Martin Norris PA-C  Date of Initial Visit: Type: THERAPY  Noted: 2023  Today's Date: 3/8/2023  Patient seen for 2 sessions         Sourav Tamez reports: his forearm is sore today and has still felt strained as well as his knee has still had discomfort as well. Pt. States he feels he did this when moving furniture.    Objective   See Exercise, Manual, and Modality Logs for complete treatment.     Assessment/Plan   Pt. Tolerates treatment well this date and ultrasound was applied for pain relief and tissue relaxation. Pt. responds well to exercise this date and mobility is good strength and soreness seem to be primary limiting factors a this time.    Goals  Plan Goals: STG to be met in 3 wk:  - Pt to have no tenderness to palpation R brachioradialis.  - Pt to report 25% improvement in ability to carry groceries.  - PT to assess SLS in 3-4 visits.  - Pt to be independent with HEP.  LTG to be met in 12 wk:  - Improve Quick DASH to 10% or less impairment.  - Improve Oxford Knee score to 15% or less impairment.  - Increase R elbow flex and ext strength to 4+/5 in order to lift heavier items.  - Increase R hip flex and abd strength to 4+/5 in order to assist with grocery shopping with less R knee pain.    Progress strengthening /stabilization /functional activity           Timed:         Manual Therapy:    15     mins  78651;     Therapeutic Exercise:    15     mins  39073;       Ultrasound:     10     mins  53060;        Timed Treatment:   40   mins   Total Treatment:     40   mins    Alia Cortez PTA  Physical Therapist Assistant License #[id number]

## 2023-03-13 ENCOUNTER — TREATMENT (OUTPATIENT)
Dept: PHYSICAL THERAPY | Facility: CLINIC | Age: 38
End: 2023-03-13
Payer: MEDICARE

## 2023-03-13 DIAGNOSIS — M77.11 LATERAL EPICONDYLITIS OF RIGHT ELBOW: Primary | ICD-10-CM

## 2023-03-13 DIAGNOSIS — M25.561 ACUTE PAIN OF RIGHT KNEE: ICD-10-CM

## 2023-03-13 DIAGNOSIS — M25.521 RIGHT ELBOW PAIN: ICD-10-CM

## 2023-03-13 PROCEDURE — 97140 MANUAL THERAPY 1/> REGIONS: CPT | Performed by: PHYSICAL THERAPIST

## 2023-03-13 PROCEDURE — 97110 THERAPEUTIC EXERCISES: CPT | Performed by: PHYSICAL THERAPIST

## 2023-03-13 PROCEDURE — 97112 NEUROMUSCULAR REEDUCATION: CPT | Performed by: PHYSICAL THERAPIST

## 2023-03-13 NOTE — PROGRESS NOTES
Physical Therapy Daily Treatment Note      Patient: Sourav Tamez   : 1985  Diagnosis/ICD-10 Code:  Lateral epicondylitis of right elbow [M77.11]  Referring practitioner: Martin Norris PA-C  Date of Initial Visit: Type: THERAPY  Noted: 2023  Today's Date: 3/13/2023  Patient seen for 3 sessions         Sourav Tamez reports: his R forearm pain is 0/10 today and is doing much better Pt. states his R knee pain is 4/10 today and medial swelling is still present when exercise is performed. Pt.states he has been doing stretches and exercises daily and they are helping but he still cannot keep his R foot from wanting to turn out when he walks.    Objective   See Exercise, Manual, and Modality Logs for complete treatment.     Assessment/Plan   Pt. responds very well to treatment this date and strengthening continues to look progressively better. Pt. Focus will be shifted into more extensive LE strengthening next to address instability of the knee and gait abnormalities.     Goals  Plan Goals: STG to be met in 3 wk:  - Pt to have no tenderness to palpation R brachioradialis.  - Pt to report 25% improvement in ability to carry groceries.  - PT to assess SLS in 3-4 visits.  - Pt to be independent with HEP.  LTG to be met in 12 wk:  - Improve Quick DASH to 10% or less impairment.  - Improve Oxford Knee score to 15% or less impairment.  - Increase R elbow flex and ext strength to 4+/5 in order to lift heavier items.  - Increase R hip flex and abd strength to 4+/5 in order to assist with grocery shopping with less R knee pain.       Progress strengthening /stabilization /functional activity           Timed:         Manual Therapy:    15     mins  48107;     Therapeutic Exercise:    15     mins  84269;     Neuromuscular Tabitha:    5    mins  30550;    Ultrasound:     8     mins  22546;        Timed Treatment:   43   mins   Total Treatment:     43   mins    Alia Cortez PTA  Physical Therapist Assistant License  #[id number]

## 2023-03-15 ENCOUNTER — TREATMENT (OUTPATIENT)
Dept: PHYSICAL THERAPY | Facility: CLINIC | Age: 38
End: 2023-03-15
Payer: MEDICARE

## 2023-03-15 DIAGNOSIS — M25.521 RIGHT ELBOW PAIN: ICD-10-CM

## 2023-03-15 DIAGNOSIS — M25.561 ACUTE PAIN OF RIGHT KNEE: ICD-10-CM

## 2023-03-15 DIAGNOSIS — M77.11 LATERAL EPICONDYLITIS OF RIGHT ELBOW: Primary | ICD-10-CM

## 2023-03-15 PROCEDURE — 97110 THERAPEUTIC EXERCISES: CPT | Performed by: PHYSICAL THERAPIST

## 2023-03-15 PROCEDURE — 97140 MANUAL THERAPY 1/> REGIONS: CPT | Performed by: PHYSICAL THERAPIST

## 2023-03-15 PROCEDURE — 97035 APP MDLTY 1+ULTRASOUND EA 15: CPT | Performed by: PHYSICAL THERAPIST

## 2023-03-15 NOTE — PROGRESS NOTES
Physical Therapy Daily Treatment Note      Patient: Sourav Tamez   : 1985  Diagnosis/ICD-10 Code:  Lateral epicondylitis of right elbow [M77.11]  Referring practitioner: Martin Norris PA-C  Date of Initial Visit: Type: THERAPY  Noted: 2023  Today's Date: 3/15/2023  Patient seen for 4 sessions         Sourav Tamez reports: his forearm is doing better still but state today it is tense. Pt. states his knee is also improving now but becomes sore after activity.    Objective   See Exercise, Manual, and Modality Logs for complete treatment.     Assessment/Plan   Pt. Tolerates treatment well this visit and is responding to increased activity this date adding NuStep and standing exercises for knee strength and stability as well as encouraging equal weight bearing in LE's.    Goals  Plan Goals: STG to be met in 3 wk:  - Pt to have no tenderness to palpation R brachioradialis.  - Pt to report 25% improvement in ability to carry groceries.  - PT to assess SLS in 3-4 visits.  - Pt to be independent with HEP.  LTG to be met in 12 wk:  - Improve Quick DASH to 10% or less impairment.  - Improve Oxford Knee score to 15% or less impairment.  - Increase R elbow flex and ext strength to 4+/5 in order to lift heavier items.  - Increase R hip flex and abd strength to 4+/5 in order to assist with grocery shopping with less R knee pain.    Progress strengthening /stabilization /functional activity           Timed:         Manual Therapy:    15     mins  76321;     Therapeutic Exercise:    20     mins  77955;     Neuromuscular Tabitha:    5    mins  25974;    Ultrasound:     8     mins  46387;      Timed Treatment:   48   mins   Total Treatment:     48   mins    Alia Cortez PTA  Physical Therapist Assistant License #[id number]

## 2023-03-16 NOTE — PROGRESS NOTES
Date of Office Visit: 2023  Encounter Provider: Dr. Alvin Arriaza  Place of Service: [facility] CARDIOLOGY [place]  Patient Name: Sourav Tamez  :1985  Judy Bassett MD    Chief Complaint   Patient presents with   • Hyperlipidemia   • Chest Pain   • Follow-up     History of Present Illness    I am pleased to see Mr. tamez in my office today  as a follow-up     As you know, patient is 38-year-old white gentleman whose past medical history is  significant for autism, Aspergers syndrome who came today for follow-up     Patient reports that he is having symptom of palpitation from last few months.  Patient underwent Holter monitor in your office.  Holter monitor showed narrow complex tachycardia with heart rate greater than 150..  Possibility of SVT cannot be excluded but most likely it seems to be sinus tachycardia.  In May 2018, patient underwent echocardiogram which showed EF of 55-60%.  Trace mitral regurgitation was noted.  In 2020, patient underwent event monitor and there was no significant arrhythmia burden was noted.    Patient came today for follow-up.  Patient had 1 episode of dizziness.  Patient denies any palpitations.  Palpitation has been contained with beta-blocker.  Patient denies any orthopnea, PND, syncope or sonya loss of consciousness.  No palpitation    EKG showed normal sinus rhythm with heart rate of 77 bpm.    At this stage, I will continue low-dose beta-blocker.  Patient is advised to check the blood pressure if he is having symptom of dizziness.  Current treatment would be continued.  Patient is again advised to follow-up with PCP but he wants to follow-up with this office yearly.      Past Medical History:   Diagnosis Date   • Acute bronchitis due to other specified organisms     Impression: Increase fluids. Tylenol/motrin for pain or fever. Medication and medication adverse effects discussed. Follow-up 5-7 days for reevaluation if not improved or sooner if  needed. Allergic component. Start prednisone.   • Acute non-recurrent maxillary sinusitis     Impression: mild. Increase fluids. Tylenol/motrin for pain or fever. Medication and medication adverse effects discussed. Follow-up 5-7 days for reevaluation if not improved or sooner if needed.   • Acute non-seasonal allergic rhinitis     Impression: With post nasal drip. Cause of URI symptoms antihistamines discussed Diagnosis, treatment and course discussed. Discussed medication, dosing and adverse effects. Return if there is worsening or persistance of symptoms   • Acute pain of right knee     Impression: discussed nsaids and stretches exam was negative strain most likely   • ADHD (attention deficit hyperactivity disorder)    • Advance care planning    • Alcohol screening    • Allergic 1990   • Anxiety and depression     Impression: Good social support, no suicidal or homicidal ideation. Diagnosis and treatment discussed. Discussed counseling. Discussed medication, dosing and adverse effects. Return in 4 weeks   • Asperger syndrome     Impression: stable 9/24/18   • Coronary artery disease 2016   • Cough     Impression: Check CXR   • Depression, major, recurrent, mild (HCC)     Impression: seeing psych   • External hemorrhoid     Impression: none bleeding now and small discussed home care   • Folliculitis     Impression: Diagnosis, treatment and course discussed. Discussed medication, dosing and adverse effects. Return if there is worsening or persistance of symptoms   • Gastroesophageal reflux disease without esophagitis     Impression: he has seen GI. refill medication   • Headache 1995   • Hiatal hernia    • Hypothyroidism     Impression: recheck today   • Impetigo    • Labile mood     Impression: question if he has some bipolar disorder? Given phone number.   • Medicare annual wellness visit, subsequent     With abnormal findings.    • Mild intermittent asthma with (acute) exacerbation     Impression: mildly  exacerbated. no wheeze on exam   • Mixed hyperlipidemia     Impression: will check labs when fasting.   • Overweight (BMI 25.0-29.9)    • Right foot pain 06/25/2020   • Right hip pain     Impression: discussed nsaids and stretches exam was negative strain most likely   • Screening for depression    • Screening PSA (prostate specific antigen)    • Substance abuse (HCC)     Story: multiple psych meds   • Visual impairment 1989         Past Surgical History:   Procedure Laterality Date   • TONSILLECTOMY  1988           Current Outpatient Medications:   •  albuterol sulfate  (90 Base) MCG/ACT inhaler, Inhale 2 puffs Every 4 (Four) Hours As Needed for Wheezing., Disp: 18 g, Rfl: 3  •  budesonide-formoterol (Symbicort) 80-4.5 MCG/ACT inhaler, Inhale 2 puffs 2 (Two) Times a Day., Disp: 6.9 g, Rfl: 12  •  busPIRone (BUSPAR) 15 MG tablet, Take 1 tablet by mouth 2 (two) times a day., Disp: , Rfl:   •  EPINEPHrine (EPIPEN) 0.3 MG/0.3ML solution auto-injector injection, , Disp: , Rfl:   •  ibuprofen (ADVIL,MOTRIN) 800 MG tablet, Take 1 tablet by mouth Every 8 (Eight) Hours As Needed for Mild Pain or Moderate Pain., Disp: 90 tablet, Rfl: 0  •  lamoTRIgine (LaMICtal) 200 MG tablet, TAKE 1 TABLET BY MOUTH DAILY, Disp: 30 tablet, Rfl: 1  •  metoprolol succinate XL (TOPROL-XL) 25 MG 24 hr tablet, Take 1 tablet by mouth Daily., Disp: 90 tablet, Rfl: 1  •  multivitamin with minerals tablet tablet, Take 1 tablet by mouth Daily., Disp: , Rfl:   •  nystatin (MYCOSTATIN) 554750 UNIT/GM cream, APPLY TOPICALLY TO THE AFFECTED AREA TWICE DAILY AS DIRECTED, Disp: 60 g, Rfl: 0  •  omeprazole (priLOSEC) 40 MG capsule, TAKE 1 CAPSULE BY MOUTH DAILY, Disp: 90 capsule, Rfl: 0  •  polyethylene glycol (MIRALAX) 17 GM/SCOOP powder, Take 17 g by mouth Daily., Disp: 225 g, Rfl: 2  •  pravastatin (PRAVACHOL) 10 MG tablet, Take 1 tablet by mouth Daily., Disp: 30 tablet, Rfl: 12  •  Triamcinolone Acetonide (NASACORT) 55 MCG/ACT nasal inhaler, 2  "sprays into the nostril(s) as directed by provider Daily., Disp: , Rfl:       Social History     Socioeconomic History   • Marital status:    Tobacco Use   • Smoking status: Never   • Smokeless tobacco: Never   Vaping Use   • Vaping Use: Never used   Substance and Sexual Activity   • Alcohol use: Yes     Comment: Once in a great while maybe only once or twice a year   • Drug use: No   • Sexual activity: Defer         Review of Systems   Constitutional: Negative for chills and fever.   HENT: Negative for ear discharge and nosebleeds.    Eyes: Negative for discharge and redness.   Cardiovascular: Negative for chest pain, orthopnea, palpitations, paroxysmal nocturnal dyspnea and syncope.   Respiratory: Negative for cough, shortness of breath and wheezing.    Endocrine: Negative for heat intolerance.   Skin: Negative for rash.   Musculoskeletal: Negative for arthritis and myalgias.   Gastrointestinal: Negative for abdominal pain, melena, nausea and vomiting.   Genitourinary: Negative for dysuria and hematuria.   Neurological: Positive for dizziness. Negative for light-headedness, numbness and tremors.   Psychiatric/Behavioral: Negative for depression. The patient is not nervous/anxious.        Procedures      ECG 12 Lead    Date/Time: 3/17/2023 2:13 PM  Performed by: Alvin Arriaza MD  Authorized by: Alvin Arriaza MD   Comparison: compared with previous ECG   Similar to previous ECG  Rhythm: sinus rhythm    Clinical impression: normal ECG            ECG 12 Lead    (Results Pending)           Objective:    /80 (BP Location: Right arm, Patient Position: Sitting, Cuff Size: Large Adult)   Pulse 77   Ht 167.6 cm (65.98\")   Wt 78.9 kg (174 lb)   SpO2 98%   BMI 28.10 kg/m²         Constitutional:       Appearance: Well-developed.   Eyes:      General: No scleral icterus.        Right eye: No discharge.   HENT:      Head: Normocephalic and atraumatic.   Neck:      Thyroid: No thyromegaly.      Lymphadenopathy: " No cervical adenopathy.   Pulmonary:      Effort: Pulmonary effort is normal. No respiratory distress.      Breath sounds: Normal breath sounds. No wheezing. No rales.   Cardiovascular:      Normal rate. Regular rhythm.      No gallop.   Abdominal:      Tenderness: There is no abdominal tenderness.   Skin:     Findings: No erythema or rash.   Neurological:      Mental Status: Alert and oriented to person, place, and time.             Assessment:       Diagnosis Plan   1. Mixed hyperlipidemia  ECG 12 Lead    metoprolol succinate XL (TOPROL-XL) 25 MG 24 hr tablet      2. Chest pain, unspecified type  ECG 12 Lead    metoprolol succinate XL (TOPROL-XL) 25 MG 24 hr tablet      3. Palpitations                 Plan:       MDM:    1.  Palpitation:    Patient symptom of palpitations are contained with Toprol-XL current treatment would be continued    2.  Dizziness:    Etiology of dizziness is unclear.  Patient complain of 1 episode of dizziness I would recommend observation and patient is advised to check the blood pressure and heart rate at the time of dizziness.  Recommend observation

## 2023-03-17 ENCOUNTER — OFFICE VISIT (OUTPATIENT)
Dept: CARDIOLOGY | Facility: CLINIC | Age: 38
End: 2023-03-17
Payer: MEDICARE

## 2023-03-17 VITALS
SYSTOLIC BLOOD PRESSURE: 114 MMHG | BODY MASS INDEX: 27.97 KG/M2 | OXYGEN SATURATION: 98 % | DIASTOLIC BLOOD PRESSURE: 80 MMHG | WEIGHT: 174 LBS | HEART RATE: 77 BPM | HEIGHT: 66 IN

## 2023-03-17 DIAGNOSIS — E78.2 MIXED HYPERLIPIDEMIA: Primary | Chronic | ICD-10-CM

## 2023-03-17 DIAGNOSIS — R07.9 CHEST PAIN, UNSPECIFIED TYPE: ICD-10-CM

## 2023-03-17 DIAGNOSIS — R00.2 PALPITATIONS: ICD-10-CM

## 2023-03-17 PROCEDURE — 1160F RVW MEDS BY RX/DR IN RCRD: CPT | Performed by: INTERNAL MEDICINE

## 2023-03-17 PROCEDURE — 1159F MED LIST DOCD IN RCRD: CPT | Performed by: INTERNAL MEDICINE

## 2023-03-17 PROCEDURE — 99213 OFFICE O/P EST LOW 20 MIN: CPT | Performed by: INTERNAL MEDICINE

## 2023-03-17 PROCEDURE — 93000 ELECTROCARDIOGRAM COMPLETE: CPT | Performed by: INTERNAL MEDICINE

## 2023-03-17 RX ORDER — METOPROLOL SUCCINATE 25 MG/1
25 TABLET, EXTENDED RELEASE ORAL DAILY
Qty: 90 TABLET | Refills: 1 | Status: SHIPPED | OUTPATIENT
Start: 2023-03-17

## 2023-03-20 ENCOUNTER — TREATMENT (OUTPATIENT)
Dept: PHYSICAL THERAPY | Facility: CLINIC | Age: 38
End: 2023-03-20
Payer: MEDICARE

## 2023-03-20 DIAGNOSIS — M25.561 ACUTE PAIN OF RIGHT KNEE: ICD-10-CM

## 2023-03-20 DIAGNOSIS — M77.11 LATERAL EPICONDYLITIS OF RIGHT ELBOW: Primary | ICD-10-CM

## 2023-03-20 DIAGNOSIS — M25.521 RIGHT ELBOW PAIN: ICD-10-CM

## 2023-03-20 PROCEDURE — 97112 NEUROMUSCULAR REEDUCATION: CPT | Performed by: PHYSICAL THERAPIST

## 2023-03-20 PROCEDURE — 97110 THERAPEUTIC EXERCISES: CPT | Performed by: PHYSICAL THERAPIST

## 2023-03-20 PROCEDURE — 97530 THERAPEUTIC ACTIVITIES: CPT | Performed by: PHYSICAL THERAPIST

## 2023-03-20 NOTE — PROGRESS NOTES
Physical Therapy Daily Treatment Note      Patient: Sourav Tamez   : 1985  Diagnosis/ICD-10 Code:  Lateral epicondylitis of right elbow [M77.11]  Referring practitioner: Martin Norris PA-C  Date of Initial Visit: Type: THERAPY  Noted: 2023  Today's Date: 3/20/2023  Patient seen for 5 sessions         Sourav Tamez reports: he continues to have elbow pain at this time but states his pain is different today stating when he extends his arm the pain is in the bend of his elbow now referencing the biceps insertion. Pt. was asked if he has been sitting prolonged with elbows bent possibly causing a biceps tightness and he expresses that he does not do that and has not done anything out of the ordinary that may have caused the pain.    Objective   See Exercise, Manual, and Modality Logs for complete treatment.     Assessment/Plan  Pt. Tolerates treatment well this date continuing to benefit from exercise progression and ultrasound treatment. Pt. Was encouraged to make sure not to go long times without moving arms and legs to avoid tightness and relieve joint discomfort.    Goals  Plan Goals: STG to be met in 3 wk:  - Pt to have no tenderness to palpation R brachioradialis.  - Pt to report 25% improvement in ability to carry groceries.  - PT to assess SLS in 3-4 visits.  - Pt to be independent with HEP.  LTG to be met in 12 wk:  - Improve Quick DASH to 10% or less impairment.  - Improve Oxford Knee score to 15% or less impairment.  - Increase R elbow flex and ext strength to 4+/5 in order to lift heavier items.  - Increase R hip flex and abd strength to 4+/5 in order to assist with grocery shopping with less R knee pain.    Progress strengthening /stabilization /functional activity           Timed:            Therapeutic Exercise:    15     mins  66897;     Neuromuscular Tabitha:   10     mins  89856;    Therapeutic Activity:     15     mins  08867;     Ultrasound:     10     mins  27665;        Timed  Treatment:   50   mins   Total Treatment:     50   mins    Alia Cortez PTA  Physical Therapist Assistant License #[id number]

## 2023-03-22 ENCOUNTER — TREATMENT (OUTPATIENT)
Dept: PHYSICAL THERAPY | Facility: CLINIC | Age: 38
End: 2023-03-22
Payer: MEDICARE

## 2023-03-22 DIAGNOSIS — M79.631 RIGHT FOREARM PAIN: ICD-10-CM

## 2023-03-22 DIAGNOSIS — M25.561 ACUTE PAIN OF RIGHT KNEE: ICD-10-CM

## 2023-03-22 DIAGNOSIS — R29.898 WEAKNESS OF RIGHT LEG: ICD-10-CM

## 2023-03-22 DIAGNOSIS — M25.521 RIGHT ELBOW PAIN: ICD-10-CM

## 2023-03-22 DIAGNOSIS — M77.11 LATERAL EPICONDYLITIS OF RIGHT ELBOW: Primary | ICD-10-CM

## 2023-03-22 DIAGNOSIS — R29.898 WEAKNESS OF RIGHT ARM: ICD-10-CM

## 2023-03-22 PROCEDURE — 97110 THERAPEUTIC EXERCISES: CPT | Performed by: PHYSICAL THERAPIST

## 2023-03-22 PROCEDURE — 97530 THERAPEUTIC ACTIVITIES: CPT | Performed by: PHYSICAL THERAPIST

## 2023-03-22 PROCEDURE — 97035 APP MDLTY 1+ULTRASOUND EA 15: CPT | Performed by: PHYSICAL THERAPIST

## 2023-03-22 NOTE — PROGRESS NOTES
Physical Therapy Daily Treatment Note      Patient: Sourav Tamez   : 1985  Diagnosis/ICD-10 Code:  Lateral epicondylitis of right elbow [M77.11]   Referring practitioner: Martin Norris PA-C  Date of Initial Visit: Type: THERAPY  Noted: 2023  Today's Date: 3/22/2023    VISIT#: 6    Subjective : Pt reports that his R elbow has been feeling better and not feeling a stretch now when he straightens his elbow. Has noticed that his R knee comes inward when he is bending his knees, like when sitting down.    Objective : Educated pt that R knee coming inward is from the hip and likely related to favoring his knee with pain.  Tenderness and increased tension noted in R brachioradialis. Pt holding R UE tense and required cues to relax.    See Exercise, Manual, and Modality Logs for complete treatment.     Assessment/Plan : Good tolerance to ther ex. Continuing to benefit from exercise progression and ultrasound treatment. Reviewed education from last visit to make sure not to go long times without moving arms and legs to avoid tightness and relieve joint discomfort.     Goals  Plan Goals: STG to be met in 3 wk:  - Pt to have no tenderness to palpation R brachioradialis. - Progressing  - Pt to report 25% improvement in ability to carry groceries.  - PT to assess SLS in 3-4 visits.  - Pt to be independent with HEP.  LTG to be met in 12 wk:  - Improve Quick DASH to 10% or less impairment.  - Improve Oxford Knee score to 15% or less impairment.  - Increase R elbow flex and ext strength to 4+/5 in order to lift heavier items.  - Increase R hip flex and abd strength to 4+/5 in order to assist with grocery shopping with less R knee pain.    Progress per Plan of Care and Progress strengthening /stabilization /functional activity         Timed:         Manual Therapy:    5     mins  80173;     Therapeutic Exercise:    15     mins  40084;     Neuromuscular Tabitha:        mins  66863;    Therapeutic Activity:     15      mins  90531;     Gait Training:           mins  66647;     Ultrasound:     10     mins  19811;    Ionto                                   mins   25749  Self Care                            mins   68233    Un-Timed:  Electrical Stimulation:         mins  21734 ( );  Dry Needling          mins 76743/20560  Traction          mins 62575  Canalith Repos                   mins  25501  Low Eval          Mins  39897  Mod Eval          Mins  08805  High Eval                            Mins  37264  Re-Eval                               mins  36106    Timed Treatment:   45   mins   Total Treatment:     45   mins    Zo Jim PT, CLT, Cert DN  Physical Therapist  [place] Lic # [id number]

## 2023-03-27 ENCOUNTER — TREATMENT (OUTPATIENT)
Dept: PHYSICAL THERAPY | Facility: CLINIC | Age: 38
End: 2023-03-27
Payer: MEDICARE

## 2023-03-27 DIAGNOSIS — M25.521 RIGHT ELBOW PAIN: ICD-10-CM

## 2023-03-27 DIAGNOSIS — R29.898 WEAKNESS OF RIGHT ARM: ICD-10-CM

## 2023-03-27 DIAGNOSIS — R29.898 WEAKNESS OF RIGHT LEG: ICD-10-CM

## 2023-03-27 DIAGNOSIS — M77.11 LATERAL EPICONDYLITIS OF RIGHT ELBOW: Primary | ICD-10-CM

## 2023-03-27 DIAGNOSIS — M25.561 ACUTE PAIN OF RIGHT KNEE: ICD-10-CM

## 2023-03-27 PROCEDURE — 97110 THERAPEUTIC EXERCISES: CPT | Performed by: PHYSICAL THERAPIST

## 2023-03-27 PROCEDURE — 97035 APP MDLTY 1+ULTRASOUND EA 15: CPT | Performed by: PHYSICAL THERAPIST

## 2023-03-27 PROCEDURE — 97530 THERAPEUTIC ACTIVITIES: CPT | Performed by: PHYSICAL THERAPIST

## 2023-03-27 NOTE — PROGRESS NOTES
Physical Therapy Daily Treatment Note      Patient: Sourav Tamez   : 1985  Diagnosis/ICD-10 Code:  Lateral epicondylitis of right elbow [M77.11]   Problems Addressed this Visit        Musculoskeletal and Injuries    Acute pain of right knee   Other Visit Diagnoses     Lateral epicondylitis of right elbow    -  Primary    Right elbow pain        Weakness of right arm        Weakness of right leg          Diagnoses       Codes Comments    Lateral epicondylitis of right elbow    -  Primary ICD-10-CM: M77.11  ICD-9-CM: 726.32     Right elbow pain     ICD-10-CM: M25.521  ICD-9-CM: 719.42     Acute pain of right knee     ICD-10-CM: M25.561  ICD-9-CM: 719.46     Weakness of right arm     ICD-10-CM: R29.898  ICD-9-CM: 729.89     Weakness of right leg     ICD-10-CM: R29.898  ICD-9-CM: 729.89         Referring practitioner: Martin Norris PA-C  Date of Initial Visit: Type: THERAPY  Noted: 2023  Today's Date: 3/27/2023    VISIT#: 7    Subjective : pt reports still having some R knee pain. Feels like R elbow is starting to get better.    Objective : Added SLR with slight hip ER for VMO strengthening.    See Exercise, Manual, and Modality Logs for complete treatment.     Assessment/Plan ; Decreased R elbow pain. Good tolerance to ther ex and manual techniques. Responds well to US for L lateral epicondylitis. Will benefit from continued R elbow and wrist strengthening and B hip and knee strengthening to further decrease pain.    Goals  Plan Goals: STG to be met in 3 wk:  - Pt to have no tenderness to palpation R brachioradialis. - Progressing  - Pt to report 25% improvement in ability to carry groceries.  - PT to assess SLS in 3-4 visits.  - Pt to be independent with HEP.  LTG to be met in 12 wk:  - Improve Quick DASH to 10% or less impairment.  - Improve Oxford Knee score to 15% or less impairment.  - Increase R elbow flex and ext strength to 4+/5 in order to lift heavier items.  - Increase R hip flex and abd  strength to 4+/5 in order to assist with grocery shopping with less R knee pain.    Progress per Plan of Care and Progress strengthening /stabilization /functional activity         Timed:         Manual Therapy:    5     mins  57217;     Therapeutic Exercise:    15     mins  98019;     Neuromuscular Tabitha:        mins  97447;    Therapeutic Activity:     15     mins  69323;     Gait Training:           mins  96938;     Ultrasound:     10     mins  93609;    Ionto                                   mins   85064  Self Care                            mins   10625    Un-Timed:  Electrical Stimulation:         mins  96051 ( );  Dry Needling          mins 06071/20560  Traction          mins 31242  Canalith Repos                   mins  78668  Low Eval          Mins  20513  Mod Eval          Mins  20827  High Eval                            Mins  83597  Re-Eval                               mins  45596    Timed Treatment:   45   mins   Total Treatment:     45   mins    Zo Jim PT, CLT, Cert DN  Physical Therapist  [place] Lic # [id number]

## 2023-04-03 NOTE — PROGRESS NOTES
Subjective   Sourav Tamez is a 38 y.o. male. Presents to [facility]    Chief Complaint   Patient presents with   • Neck Pain       Neck Pain   This is a new problem. The current episode started in the past 7 days. The problem occurs daily. The problem has been unchanged. The pain is associated with an unknown factor. The pain is present in the occipital region. The quality of the pain is described as aching. The pain is mild. The symptoms are aggravated by twisting. The pain is same all the time. Stiffness is present all day. Associated symptoms include headaches and numbness. Pertinent negatives include no weakness. He has tried NSAIDs for the symptoms. The treatment provided mild relief.      Hurts to tilt head. Feels like a pull.   He felt numbness once in back of his head with the pull in muscles.     I personally reviewed and updated the patient's allergies, medications, problem list, and past medical, surgical, social, and family history. I have reviewed and confirmed the accuracy of the History of Present Illness and Review of Symptoms as documented by the MA/LPN/RN. Judy Bassett MD    Allergies:  Allergies   Allergen Reactions   • Amoxicillin Swelling and Shortness Of Breath   • Cetirizine Swelling   • Diphenhydramine Swelling   • Doxycycline Swelling   • Escitalopram Swelling     Swelling of throat   • Fexofenadine Anaphylaxis   • Loratadine Swelling   • Montelukast Swelling   • Penicillins Shortness Of Breath   • Prednisone Swelling   • Trazodone Swelling   • Codeine Myalgia   • Latex Rash   • Mucinex [Guaifenesin Er] Cough   • Olanzapine Rash   • Robitussin (Alcohol Free) [Guaifenesin] Cough       Social History:  Social History     Socioeconomic History   • Marital status:    Tobacco Use   • Smoking status: Never   • Smokeless tobacco: Never   Vaping Use   • Vaping Use: Never used   Substance and Sexual Activity   • Alcohol use: Yes     Comment: Once in a great while maybe  only once or twice a year   • Drug use: No   • Sexual activity: Defer       Family History:  Family History   Problem Relation Age of Onset   • Hypertension Mother    • Hyperlipidemia Mother    • Hyperlipidemia Maternal Grandmother    • Asthma Daughter    • Asthma Daughter        Past Medical History :  Patient Active Problem List   Diagnosis   • Perennial allergic rhinitis   • ADD (attention deficit disorder)   • Asperger's syndrome   • Mixed hyperlipidemia   • Sliding hiatal hernia   • Anxiety   • Depression, major, recurrent, mild   • Gastroesophageal reflux disease without esophagitis   • Hypothyroidism (acquired)   • Mild intermittent asthma without complication   • Labile mood   • Chronic idiopathic constipation   • Overweight with body mass index (BMI) of 27 to 27.9 in adult   • Tinea cruris   • Medicare annual wellness visit, subsequent   • Vasovagal syncope   • Neck pain   • Trapezius muscle spasm   • History of use of hearing aid in right ear       Medication List:    Current Outpatient Medications:   •  albuterol sulfate  (90 Base) MCG/ACT inhaler, Inhale 2 puffs Every 4 (Four) Hours As Needed for Wheezing., Disp: 18 g, Rfl: 3  •  budesonide-formoterol (Symbicort) 80-4.5 MCG/ACT inhaler, Inhale 2 puffs 2 (Two) Times a Day., Disp: 6.9 g, Rfl: 12  •  busPIRone (BUSPAR) 15 MG tablet, Take 1 tablet by mouth 2 (two) times a day., Disp: , Rfl:   •  EPINEPHrine (EPIPEN) 0.3 MG/0.3ML solution auto-injector injection, , Disp: , Rfl:   •  ibuprofen (ADVIL,MOTRIN) 800 MG tablet, Take 1 tablet by mouth Every 8 (Eight) Hours As Needed for Mild Pain or Moderate Pain., Disp: 90 tablet, Rfl: 0  •  lamoTRIgine (LaMICtal) 200 MG tablet, TAKE 1 TABLET BY MOUTH DAILY, Disp: 30 tablet, Rfl: 1  •  metoprolol succinate XL (TOPROL-XL) 25 MG 24 hr tablet, Take 1 tablet by mouth Daily., Disp: 90 tablet, Rfl: 1  •  multivitamin with minerals tablet tablet, Take 1 tablet by mouth Daily., Disp: , Rfl:   •  nystatin  (MYCOSTATIN) 931786 UNIT/GM cream, APPLY TOPICALLY TO THE AFFECTED AREA TWICE DAILY AS DIRECTED, Disp: 60 g, Rfl: 0  •  omeprazole (priLOSEC) 40 MG capsule, TAKE 1 CAPSULE BY MOUTH DAILY, Disp: 90 capsule, Rfl: 0  •  polyethylene glycol (MIRALAX) 17 GM/SCOOP powder, Take 17 g by mouth Daily., Disp: 225 g, Rfl: 2  •  pravastatin (PRAVACHOL) 10 MG tablet, Take 1 tablet by mouth Daily., Disp: 30 tablet, Rfl: 12  •  Triamcinolone Acetonide (NASACORT) 55 MCG/ACT nasal inhaler, 2 sprays into the nostril(s) as directed by provider Daily., Disp: , Rfl:   •  baclofen (LIORESAL) 10 MG tablet, Take 1 tablet by mouth At Night As Needed for Muscle Spasms., Disp: 30 tablet, Rfl: 0    Past Surgical History:  Past Surgical History:   Procedure Laterality Date   • TONSILLECTOMY  1988         Physical Exam:      Vital Signs:    Vitals:    04/04/23 1050   BP: 126/80   Pulse: 88   Resp: 18   Temp: 97.5 °F (36.4 °C)   SpO2: 98%        Wt Readings from Last 3 Encounters:   04/04/23 81 kg (178 lb 9.6 oz)   03/17/23 78.9 kg (174 lb)   02/02/23 78.9 kg (174 lb)       Result Review :                Physical Exam  Vitals reviewed.   Constitutional:       Appearance: Normal appearance. He is well-developed.   HENT:      Head: Normocephalic and atraumatic.   Eyes:      General:         Right eye: No discharge.         Left eye: No discharge.   Neck:      Thyroid: No thyromegaly.   Cardiovascular:      Rate and Rhythm: Normal rate and regular rhythm.      Heart sounds: Normal heart sounds. No murmur heard.    No friction rub. No gallop.   Pulmonary:      Effort: Pulmonary effort is normal. No respiratory distress.      Breath sounds: Normal breath sounds. No wheezing or rales.   Musculoskeletal:      Cervical back: Normal range of motion. No rigidity, spasms, torticollis or tenderness. Pain with movement present. No muscular tenderness.      Thoracic back: Spasms and tenderness present.   Lymphadenopathy:      Cervical: No cervical adenopathy.    Skin:     General: Skin is warm and dry.      Findings: No rash.   Neurological:      Mental Status: He is alert and oriented to person, place, and time.      Coordination: Coordination normal.      Gait: Gait normal.   Psychiatric:         Behavior: Behavior is cooperative.         Assessment and Plan:  Problems Addressed this Visit        Musculoskeletal and Injuries    Neck pain - Primary     Coming from trapezius         Trapezius muscle spasm     Ice three times a day for about 10-15 minutes for the first 1-2 days. Then may alternate heat and ice. Better body mechanics discussed. Home exercises discussed and hand out given. Discussed nsaids and if they can be taken. May need imaging and or PT if persists.  Discussed red flags, if there is severe pain, fever with pain, loss of movement in one or both legs pain, numbness in groin or both legs, trouble urinating or defecating on oneself, then patient is to go to the ER.            Relevant Medications    baclofen (LIORESAL) 10 MG tablet       Other    Overweight with body mass index (BMI) of 27 to 27.9 in adult     Patient's (Body mass index is 28.83 kg/m².) indicates that they are overweight with health conditions that include hypertension and dyslipidemias . Weight is worsening. BMI is is above average; BMI management plan is completed. We discussed portion control and increasing exercise.         Diagnoses       Codes Comments    Neck pain    -  Primary ICD-10-CM: M54.2  ICD-9-CM: 723.1     Overweight with body mass index (BMI) of 27 to 27.9 in adult     ICD-10-CM: E66.3, Z68.27  ICD-9-CM: 278.02, V85.23     Trapezius muscle spasm     ICD-10-CM: M62.838  ICD-9-CM: 728.85            BMI is >= 25 and <30. (Overweight) The following options were offered after discussion;: weight loss educational material (shared in after visit summary), exercise counseling/recommendations and nutrition counseling/recommendations      An After Visit Summary and PPPS were given to  the patient.       I wore protective equipment throughout this patient encounter to include mask and eyewear. Hand hygiene was performed before donning protective equipment and after removal when leaving the room.

## 2023-04-04 ENCOUNTER — OFFICE VISIT (OUTPATIENT)
Dept: FAMILY MEDICINE CLINIC | Facility: CLINIC | Age: 38
End: 2023-04-04
Payer: MEDICARE

## 2023-04-04 VITALS
BODY MASS INDEX: 28.7 KG/M2 | OXYGEN SATURATION: 98 % | TEMPERATURE: 97.5 F | DIASTOLIC BLOOD PRESSURE: 80 MMHG | WEIGHT: 178.6 LBS | SYSTOLIC BLOOD PRESSURE: 126 MMHG | HEIGHT: 66 IN | RESPIRATION RATE: 18 BRPM | HEART RATE: 88 BPM

## 2023-04-04 DIAGNOSIS — M62.838 TRAPEZIUS MUSCLE SPASM: ICD-10-CM

## 2023-04-04 DIAGNOSIS — E66.3 OVERWEIGHT WITH BODY MASS INDEX (BMI) OF 27 TO 27.9 IN ADULT: ICD-10-CM

## 2023-04-04 DIAGNOSIS — M54.2 NECK PAIN: Primary | ICD-10-CM

## 2023-04-04 PROBLEM — H91.93 BILATERAL HEARING LOSS: Status: RESOLVED | Noted: 2022-05-13 | Resolved: 2023-04-04

## 2023-04-04 PROBLEM — S49.92XA INJURY OF LEFT SHOULDER: Status: RESOLVED | Noted: 2023-01-09 | Resolved: 2023-04-04

## 2023-04-04 PROBLEM — M25.561 ACUTE PAIN OF RIGHT KNEE: Status: RESOLVED | Noted: 2020-03-03 | Resolved: 2023-04-04

## 2023-04-04 PROBLEM — Z92.89 HISTORY OF USE OF HEARING AID IN RIGHT EAR: Status: ACTIVE | Noted: 2023-04-04

## 2023-04-04 PROBLEM — R42 DIZZINESS: Status: RESOLVED | Noted: 2023-01-09 | Resolved: 2023-04-04

## 2023-04-04 PROBLEM — M79.604 RIGHT LEG PAIN: Status: RESOLVED | Noted: 2023-01-09 | Resolved: 2023-04-04

## 2023-04-04 PROCEDURE — 99213 OFFICE O/P EST LOW 20 MIN: CPT | Performed by: FAMILY MEDICINE

## 2023-04-04 RX ORDER — BACLOFEN 10 MG/1
10 TABLET ORAL NIGHTLY PRN
Qty: 30 TABLET | Refills: 0 | Status: SHIPPED | OUTPATIENT
Start: 2023-04-04

## 2023-04-04 NOTE — ASSESSMENT & PLAN NOTE
Patient's (Body mass index is 28.83 kg/m².) indicates that they are overweight with health conditions that include hypertension and dyslipidemias . Weight is worsening. BMI is is above average; BMI management plan is completed. We discussed portion control and increasing exercise.

## 2023-05-04 ENCOUNTER — TELEPHONE (OUTPATIENT)
Dept: FAMILY MEDICINE CLINIC | Facility: CLINIC | Age: 38
End: 2023-05-04
Payer: MEDICARE

## 2023-05-04 NOTE — TELEPHONE ENCOUNTER
----- Message from Sourav Tamez sent at 5/2/2023  7:30 PM EDT -----  Regarding: Medicine   Contact: [phone]  Hey I’m going to be going to my nieces 10 yrs anniversary formal dinner party may 20th of this month   And was wanting to know will it be safe for me to drink wine with the meds that’s I’m taking ???    I want to be safe   There will be none alcohol drinks as well

## 2023-06-03 DIAGNOSIS — M62.838 TRAPEZIUS MUSCLE SPASM: ICD-10-CM

## 2023-06-04 RX ORDER — BACLOFEN 10 MG/1
10 TABLET ORAL NIGHTLY PRN
Qty: 30 TABLET | Refills: 0 | Status: SHIPPED | OUTPATIENT
Start: 2023-06-04

## 2023-07-18 NOTE — PROGRESS NOTES
Subjective   Sourav Tamez is a 38 y.o. male. Presents to [facility]    Chief Complaint   Patient presents with    Anxiety    Depression       Anxiety  Presents for follow-up visit. Symptoms include decreased concentration, depressed mood, insomnia, irritability, nervous/anxious behavior and restlessness. Patient reports no excessive worry, hyperventilation, palpitations or panic. Symptoms occur most days. The severity of symptoms is moderate. The patient sleeps 5 hours per night. The quality of sleep is fair. Nighttime awakenings: one to two.      He is seeing a psychiatrist in [place]    I personally reviewed and updated the patient's allergies, medications, problem list, and past medical, surgical, social, and family history. I have reviewed and confirmed the accuracy of the History of Present Illness and Review of Symptoms as documented by the MA/LPN/RN. Judy Bassett MD    Allergies:  Allergies   Allergen Reactions    Amoxicillin Swelling and Shortness Of Breath    Cetirizine Swelling    Diphenhydramine Swelling    Doxycycline Swelling    Escitalopram Swelling     Swelling of throat    Fexofenadine Anaphylaxis    Loratadine Swelling    Montelukast Swelling    Penicillins Shortness Of Breath    Prednisone Swelling    Trazodone Swelling    Codeine Myalgia    Latex Rash    Mucinex [Guaifenesin Er] Cough    Olanzapine Rash    Robitussin (Alcohol Free) [Guaifenesin] Cough       Social History:  Social History     Socioeconomic History    Marital status:    Tobacco Use    Smoking status: Never    Smokeless tobacco: Never   Vaping Use    Vaping Use: Never used   Substance and Sexual Activity    Alcohol use: Yes     Comment: Once in a great while maybe only once or twice a year    Drug use: No    Sexual activity: Defer       Family History:  Family History   Problem Relation Age of Onset    Hypertension Mother     Hyperlipidemia Mother     Hyperlipidemia Maternal Grandmother     Asthma  Daughter     Asthma Daughter        Past Medical History :  Patient Active Problem List   Diagnosis    Perennial allergic rhinitis    ADD (attention deficit disorder)    Asperger's syndrome    Mixed hyperlipidemia    Sliding hiatal hernia    Anxiety    Depression, major, recurrent, mild    Gastroesophageal reflux disease without esophagitis    Hypothyroidism (acquired)    Mild intermittent asthma without complication    Labile mood    Chronic idiopathic constipation    Overweight with body mass index (BMI) of 27 to 27.9 in adult    Tinea cruris    Medicare annual wellness visit, subsequent    Vasovagal syncope    Neck pain    Trapezius muscle spasm    History of use of hearing aid in right ear       Medication List:    Current Outpatient Medications:     albuterol sulfate  (90 Base) MCG/ACT inhaler, Inhale 2 puffs Every 4 (Four) Hours As Needed for Wheezing., Disp: 18 g, Rfl: 3    baclofen (LIORESAL) 10 MG tablet, TAKE 1 TABLET BY MOUTH AT NIGHT AS NEEDED FOR MUSCLE SPASMS, Disp: 30 tablet, Rfl: 0    budesonide-formoterol (Symbicort) 80-4.5 MCG/ACT inhaler, Inhale 2 puffs 2 (Two) Times a Day., Disp: 6.9 g, Rfl: 12    busPIRone (BUSPAR) 15 MG tablet, Take 1 tablet by mouth 2 (two) times a day., Disp: , Rfl:     EPINEPHrine (EPIPEN) 0.3 MG/0.3ML solution auto-injector injection, , Disp: , Rfl:     ibuprofen (ADVIL,MOTRIN) 800 MG tablet, Take 1 tablet by mouth Every 8 (Eight) Hours As Needed for Mild Pain or Moderate Pain., Disp: 90 tablet, Rfl: 0    lamoTRIgine (LaMICtal) 200 MG tablet, TAKE 1 TABLET BY MOUTH DAILY, Disp: 30 tablet, Rfl: 1    metoprolol succinate XL (TOPROL-XL) 25 MG 24 hr tablet, Take 1 tablet by mouth Daily., Disp: 90 tablet, Rfl: 1    multivitamin with minerals tablet tablet, Take 1 tablet by mouth Daily., Disp: , Rfl:     nystatin (MYCOSTATIN) 020973 UNIT/GM cream, APPLY TOPICALLY TO THE AFFECTED AREA TWICE DAILY AS DIRECTED, Disp: 60 g, Rfl: 0    omeprazole (priLOSEC) 40 MG capsule, TAKE 1  "CAPSULE BY MOUTH DAILY, Disp: 90 capsule, Rfl: 0    polyethylene glycol (MIRALAX) 17 GM/SCOOP powder, Take 17 g by mouth Daily., Disp: 225 g, Rfl: 2    pravastatin (PRAVACHOL) 10 MG tablet, Take 1 tablet by mouth Daily., Disp: 30 tablet, Rfl: 12    Triamcinolone Acetonide (NASACORT) 55 MCG/ACT nasal inhaler, 2 sprays into the nostril(s) as directed by provider Daily., Disp: , Rfl:     Past Surgical History:  Past Surgical History:   Procedure Laterality Date    TONSILLECTOMY  1988         Physical Exam:      Vital Signs:    Vitals:    07/21/23 1413   BP: 112/70   Pulse: 88   Resp: 18   Temp: 97.3 °F (36.3 °C)   SpO2: 98%        /70 (BP Location: Right arm, Patient Position: Sitting, Cuff Size: Adult)   Pulse 88   Temp 97.3 °F (36.3 °C) (Temporal)   Resp 18   Ht 167.6 cm (66\")   Wt 79.2 kg (174 lb 9.6 oz)   SpO2 98% Comment: room air  BMI 28.18 kg/m²     Wt Readings from Last 3 Encounters:   07/21/23 79.2 kg (174 lb 9.6 oz)   04/04/23 81 kg (178 lb 9.6 oz)   03/17/23 78.9 kg (174 lb)       Result Review :                Physical Exam  Vitals reviewed.   Constitutional:       Appearance: Normal appearance. He is well-developed.   HENT:      Head: Normocephalic and atraumatic.   Eyes:      General:         Right eye: No discharge.         Left eye: No discharge.   Cardiovascular:      Rate and Rhythm: Normal rate and regular rhythm.      Heart sounds: Normal heart sounds. No murmur heard.    No friction rub. No gallop.   Pulmonary:      Effort: Pulmonary effort is normal. No respiratory distress.      Breath sounds: Normal breath sounds. No wheezing or rales.   Skin:     General: Skin is warm and dry.      Findings: No rash.   Neurological:      Mental Status: He is alert and oriented to person, place, and time.      Coordination: Coordination normal.      Gait: Gait normal.   Psychiatric:         Behavior: Behavior is cooperative.     Form filled out    Assessment and Plan:  Problems Addressed this Visit  "         Mental Health    Anxiety - Primary (Chronic)    Depression, major, recurrent, mild (Chronic)     Patient's depression is recurrent and is moderate without psychosis. Their depression is currently active and the condition is unchanged. This will be reassessed at the next regular appointment. F/U as described:patient will continue current medication therapy.            Neuro    Asperger's syndrome (Chronic)       Other    Overweight with body mass index (BMI) of 27 to 27.9 in adult     Patient's (Body mass index is 28.18 kg/m².) indicates that they are overweight with health conditions that include dyslipidemias . Weight is improving with lifestyle modifications. BMI is is above average; BMI management plan is completed. We discussed portion control and increasing exercise.           Diagnoses         Codes Comments    Anxiety    -  Primary ICD-10-CM: F41.9  ICD-9-CM: 300.00     Depression, major, recurrent, mild     ICD-10-CM: F33.0  ICD-9-CM: 296.31     Asperger's syndrome     ICD-10-CM: F84.5  ICD-9-CM: 299.80     Overweight with body mass index (BMI) of 27 to 27.9 in adult     ICD-10-CM: E66.3, Z68.27  ICD-9-CM: 278.02, V85.23                     An After Visit Summary and PPPS were given to the patient.

## 2023-07-21 ENCOUNTER — OFFICE VISIT (OUTPATIENT)
Dept: FAMILY MEDICINE CLINIC | Facility: CLINIC | Age: 38
End: 2023-07-21
Payer: MEDICARE

## 2023-07-21 VITALS
RESPIRATION RATE: 18 BRPM | DIASTOLIC BLOOD PRESSURE: 70 MMHG | BODY MASS INDEX: 28.06 KG/M2 | TEMPERATURE: 97.3 F | HEART RATE: 88 BPM | WEIGHT: 174.6 LBS | OXYGEN SATURATION: 98 % | SYSTOLIC BLOOD PRESSURE: 112 MMHG | HEIGHT: 66 IN

## 2023-07-21 DIAGNOSIS — F33.0 DEPRESSION, MAJOR, RECURRENT, MILD: Chronic | ICD-10-CM

## 2023-07-21 DIAGNOSIS — E66.3 OVERWEIGHT WITH BODY MASS INDEX (BMI) OF 27 TO 27.9 IN ADULT: ICD-10-CM

## 2023-07-21 DIAGNOSIS — F41.9 ANXIETY: Primary | Chronic | ICD-10-CM

## 2023-07-21 DIAGNOSIS — F84.5 ASPERGER'S SYNDROME: Chronic | ICD-10-CM

## 2023-07-21 PROCEDURE — 99212 OFFICE O/P EST SF 10 MIN: CPT | Performed by: FAMILY MEDICINE

## 2023-07-21 NOTE — ASSESSMENT & PLAN NOTE
Patient's (Body mass index is 28.18 kg/m².) indicates that they are overweight with health conditions that include dyslipidemias . Weight is improving with lifestyle modifications. BMI is is above average; BMI management plan is completed. We discussed portion control and increasing exercise.

## 2023-08-29 NOTE — PROGRESS NOTES
Subjective   Sourav Tamez is a 38 y.o. male. Presents to [facility]    Chief Complaint   Patient presents with    Hip Pain       Hip Pain   The incident occurred more than 1 week ago (04/2023). The injury mechanism is unknown. The pain is present in the right hip. The pain is at a severity of 0/10. The patient is experiencing no pain. Associated symptoms include a loss of motion. Pertinent negatives include no inability to bear weight, numbness or tingling. Associated symptoms comments: Trouble walking. He has tried nothing for the symptoms. The treatment provided no relief.      I personally reviewed and updated the patient's allergies, medications, problem list, and past medical, surgical, social, and family history. I have reviewed and confirmed the accuracy of the History of Present Illness and Review of Symptoms as documented by the MA/LPN/RN. Judy Bassett MD    Allergies:  Allergies   Allergen Reactions    Amoxicillin Swelling and Shortness Of Breath    Cetirizine Swelling    Diphenhydramine Swelling    Doxycycline Swelling    Escitalopram Swelling     Swelling of throat    Fexofenadine Anaphylaxis    Loratadine Swelling    Montelukast Swelling    Penicillins Shortness Of Breath    Prednisone Swelling    Trazodone Swelling    Codeine Myalgia    Latex Rash    Mucinex [Guaifenesin Er] Cough    Olanzapine Rash    Robitussin (Alcohol Free) [Guaifenesin] Cough       Social History:  Social History     Socioeconomic History    Marital status:    Tobacco Use    Smoking status: Never    Smokeless tobacco: Never   Vaping Use    Vaping Use: Never used   Substance and Sexual Activity    Alcohol use: Yes     Comment: Once in a great while maybe only once or twice a year    Drug use: No    Sexual activity: Defer       Family History:  Family History   Problem Relation Age of Onset    Hypertension Mother     Hyperlipidemia Mother     Hyperlipidemia Maternal Grandmother     Asthma Daughter      Asthma Daughter        Past Medical History :  Patient Active Problem List   Diagnosis    Perennial allergic rhinitis    ADD (attention deficit disorder)    Asperger's syndrome    Mixed hyperlipidemia    Sliding hiatal hernia    Anxiety    Depression, major, recurrent, mild    Gastroesophageal reflux disease without esophagitis    Hypothyroidism (acquired)    Mild intermittent asthma without complication    Labile mood    Chronic idiopathic constipation    Overweight with body mass index (BMI) of 27 to 27.9 in adult    Tinea cruris    Medicare annual wellness visit, subsequent    Vasovagal syncope    Neck pain    Trapezius muscle spasm    History of use of hearing aid in right ear    Right hip pain       Medication List:    Current Outpatient Medications:     albuterol sulfate  (90 Base) MCG/ACT inhaler, Inhale 2 puffs Every 4 (Four) Hours As Needed for Wheezing., Disp: 18 g, Rfl: 3    baclofen (LIORESAL) 10 MG tablet, TAKE 1 TABLET BY MOUTH AT NIGHT AS NEEDED FOR MUSCLE SPASMS, Disp: 30 tablet, Rfl: 0    budesonide-formoterol (Symbicort) 80-4.5 MCG/ACT inhaler, Inhale 2 puffs 2 (Two) Times a Day., Disp: 6.9 g, Rfl: 12    busPIRone (BUSPAR) 15 MG tablet, Take 1 tablet by mouth 2 (two) times a day., Disp: , Rfl:     EPINEPHrine (EPIPEN) 0.3 MG/0.3ML solution auto-injector injection, , Disp: , Rfl:     ibuprofen (ADVIL,MOTRIN) 800 MG tablet, Take 1 tablet by mouth Every 8 (Eight) Hours As Needed for Mild Pain or Moderate Pain., Disp: 90 tablet, Rfl: 0    lamoTRIgine (LaMICtal) 200 MG tablet, TAKE 1 TABLET BY MOUTH DAILY, Disp: 30 tablet, Rfl: 1    metoprolol succinate XL (TOPROL-XL) 25 MG 24 hr tablet, Take 1 tablet by mouth Daily., Disp: 90 tablet, Rfl: 1    multivitamin with minerals tablet tablet, Take 1 tablet by mouth Daily., Disp: , Rfl:     nystatin (MYCOSTATIN) 419219 UNIT/GM cream, APPLY TOPICALLY TO THE AFFECTED AREA TWICE DAILY AS DIRECTED, Disp: 60 g, Rfl: 0    omeprazole (priLOSEC) 40 MG capsule,  "TAKE 1 CAPSULE BY MOUTH DAILY, Disp: 90 capsule, Rfl: 0    polyethylene glycol (MIRALAX) 17 GM/SCOOP powder, Take 17 g by mouth Daily., Disp: 225 g, Rfl: 2    Triamcinolone Acetonide (NASACORT) 55 MCG/ACT nasal inhaler, 2 sprays into the nostril(s) as directed by provider Daily., Disp: , Rfl:     pravastatin (PRAVACHOL) 10 MG tablet, TAKE 1 TABLET BY MOUTH DAILY, Disp: 30 tablet, Rfl: 12    Past Surgical History:  Past Surgical History:   Procedure Laterality Date    TONSILLECTOMY  1988         Physical Exam:      Vital Signs:    Vitals:    09/01/23 1438   BP: 112/80   Pulse: 81   Resp: 18   Temp: 97.5 °F (36.4 °C)   SpO2: 98%        /80 (BP Location: Right arm, Patient Position: Sitting, Cuff Size: Adult)   Pulse 81   Temp 97.5 °F (36.4 °C) (Temporal)   Resp 18   Ht 167.6 cm (66\")   Wt 77.6 kg (171 lb)   SpO2 98% Comment: room air  BMI 27.60 kg/m²     Wt Readings from Last 3 Encounters:   09/01/23 77.6 kg (171 lb)   07/21/23 79.2 kg (174 lb 9.6 oz)   04/04/23 81 kg (178 lb 9.6 oz)       Result Review :                Physical Exam  Vitals reviewed.   Constitutional:       Appearance: Normal appearance. He is well-developed.   HENT:      Head: Normocephalic and atraumatic.   Eyes:      General:         Right eye: No discharge.         Left eye: No discharge.   Cardiovascular:      Rate and Rhythm: Normal rate and regular rhythm.      Heart sounds: Normal heart sounds. No murmur heard.    No friction rub. No gallop.   Pulmonary:      Effort: Pulmonary effort is normal. No respiratory distress.      Breath sounds: Normal breath sounds. No wheezing or rales.   Musculoskeletal:      Right hip: No deformity, tenderness, bony tenderness or crepitus. Normal range of motion.   Skin:     General: Skin is warm and dry.      Findings: No rash.   Neurological:      Mental Status: He is alert and oriented to person, place, and time.      Coordination: Coordination normal.      Gait: Gait normal.   Psychiatric:       "   Behavior: Behavior is cooperative.       Assessment and Plan:  Problems Addressed this Visit          Musculoskeletal and Injuries    Right hip pain - Primary     Will get xray and place in PT  Take nsaids as needed         Relevant Orders    XR Hip With or Without Pelvis 2 - 3 View Right (Completed)    Ambulatory Referral to Physical Therapy Evaluate and treat       Other    Overweight with body mass index (BMI) of 27 to 27.9 in adult     Patient's (Body mass index is 27.6 kg/m².) indicates that they are overweight with health conditions that include dyslipidemias . Weight is improving with lifestyle modifications. BMI is is above average; BMI management plan is completed. We discussed portion control and increasing exercise.           Diagnoses         Codes Comments    Right hip pain    -  Primary ICD-10-CM: M25.551  ICD-9-CM: 719.45     Overweight with body mass index (BMI) of 27 to 27.9 in adult     ICD-10-CM: E66.3, Z68.27  ICD-9-CM: 278.02, V85.23                     An After Visit Summary and PPPS were given to the patient.

## 2023-09-01 ENCOUNTER — HOSPITAL ENCOUNTER (OUTPATIENT)
Dept: GENERAL RADIOLOGY | Facility: HOSPITAL | Age: 38
Discharge: HOME | End: 2023-09-01
Admitting: FAMILY MEDICINE
Payer: MEDICARE

## 2023-09-01 ENCOUNTER — OFFICE VISIT (OUTPATIENT)
Dept: FAMILY MEDICINE CLINIC | Facility: CLINIC | Age: 38
End: 2023-09-01
Payer: MEDICARE

## 2023-09-01 VITALS
TEMPERATURE: 97.5 F | BODY MASS INDEX: 27.48 KG/M2 | HEART RATE: 81 BPM | WEIGHT: 171 LBS | RESPIRATION RATE: 18 BRPM | DIASTOLIC BLOOD PRESSURE: 80 MMHG | SYSTOLIC BLOOD PRESSURE: 112 MMHG | OXYGEN SATURATION: 98 % | HEIGHT: 66 IN

## 2023-09-01 DIAGNOSIS — E66.3 OVERWEIGHT WITH BODY MASS INDEX (BMI) OF 27 TO 27.9 IN ADULT: ICD-10-CM

## 2023-09-01 DIAGNOSIS — M25.551 RIGHT HIP PAIN: Primary | ICD-10-CM

## 2023-09-01 DIAGNOSIS — E78.2 MIXED HYPERLIPIDEMIA: Chronic | ICD-10-CM

## 2023-09-01 PROCEDURE — 73502 X-RAY EXAM HIP UNI 2-3 VIEWS: CPT

## 2023-09-01 PROCEDURE — 99212 OFFICE O/P EST SF 10 MIN: CPT | Performed by: FAMILY MEDICINE

## 2023-09-01 RX ORDER — PRAVASTATIN SODIUM 10 MG
10 TABLET ORAL DAILY
Qty: 30 TABLET | Refills: 12 | Status: SHIPPED | OUTPATIENT
Start: 2023-09-01

## 2023-09-01 NOTE — ASSESSMENT & PLAN NOTE
Patient's (Body mass index is 27.6 kg/m².) indicates that they are overweight with health conditions that include dyslipidemias . Weight is improving with lifestyle modifications. BMI is is above average; BMI management plan is completed. We discussed portion control and increasing exercise.

## 2023-09-20 ENCOUNTER — TREATMENT (OUTPATIENT)
Dept: PHYSICAL THERAPY | Facility: CLINIC | Age: 38
End: 2023-09-20
Payer: MEDICARE

## 2023-09-20 DIAGNOSIS — M25.651 HIP STIFFNESS, RIGHT: Primary | ICD-10-CM

## 2023-09-20 DIAGNOSIS — M16.11 PRIMARY OSTEOARTHRITIS OF RIGHT HIP: ICD-10-CM

## 2023-09-29 DIAGNOSIS — K59.04 CHRONIC IDIOPATHIC CONSTIPATION: ICD-10-CM

## 2023-09-29 RX ORDER — POLYETHYLENE GLYCOL 3350 17 G/17G
17 POWDER, FOR SOLUTION ORAL DAILY
Qty: 225 G | Refills: 2 | Status: SHIPPED | OUTPATIENT
Start: 2023-09-29

## 2023-10-03 ENCOUNTER — TREATMENT (OUTPATIENT)
Dept: PHYSICAL THERAPY | Facility: CLINIC | Age: 38
End: 2023-10-03
Payer: MEDICARE

## 2023-10-03 DIAGNOSIS — M16.11 PRIMARY OSTEOARTHRITIS OF RIGHT HIP: ICD-10-CM

## 2023-10-03 DIAGNOSIS — M25.651 HIP STIFFNESS, RIGHT: Primary | ICD-10-CM

## 2023-10-03 NOTE — PROGRESS NOTES
Physical Therapy Daily Treatment Note  [address] [facility] [address], [address], [place]  [address]    Patient: Sourav Tamez   : 1985  Diagnosis/ICD-10 Code:  Hip stiffness, right [M25.651]   Problems Addressed this Visit    None  Visit Diagnoses       Hip stiffness, right    -  Primary    Primary osteoarthritis of right hip              Diagnoses         Codes Comments    Hip stiffness, right    -  Primary ICD-10-CM: M25.651  ICD-9-CM: 719.55     Primary osteoarthritis of right hip     ICD-10-CM: M16.11  ICD-9-CM: 715.15           Referring practitioner: Judy Bassett MD  Date of Initial Visit: Type: THERAPY  Noted: 2023  Today's Date: 10/3/2023    VISIT#: 2    Subjective   Sourav reports his hips remain stiff, though he's been able to do kick boxing (not striking anything with his foot).     Objective     See Exercise, Manual, and Modality Logs for complete treatment.     Assessment/Plan  Sourav was able to tolerate all glute strengthening challenges w/reports of muscle fatigue, but no pain. Will f/u again .   Progress strengthening /stabilization /functional activity         Timed:         Manual Therapy:    10     mins  37042;     Therapeutic Exercise:    20     mins  99386;     Neuromuscular Tabitha:        mins  76214;    Therapeutic Activity:          mins  51696;     Gait Training:           mins  79499;     Ultrasound:          mins  20371;    Ionto                                   mins   11266  Self Care                            mins   37124  Canalith Repos                   mins  80360  Tests & Measures              mins   33558      Un-Timed:  Electrical Stimulation:         mins  24413 ( );  Dry Needling          mins 59068/16794  Traction          mins 06890  Low Eval          Mins  86008  Mod Eval          Mins  66200  High Eval                            Mins  75372  Re-Eval                               mins  30996    Timed Treatment:   30   mins   Total Treatment:     30   mins    Brianna  Balwinder, PT, DPT, cert. DN  Physical Therapist  [place] Lic # [id number]

## 2023-11-04 DIAGNOSIS — R07.9 CHEST PAIN, UNSPECIFIED TYPE: ICD-10-CM

## 2023-11-04 DIAGNOSIS — E78.2 MIXED HYPERLIPIDEMIA: Chronic | ICD-10-CM

## 2023-11-06 RX ORDER — METOPROLOL SUCCINATE 25 MG/1
25 TABLET, EXTENDED RELEASE ORAL DAILY
Qty: 90 TABLET | Refills: 1 | Status: SHIPPED | OUTPATIENT
Start: 2023-11-06

## 2023-11-09 ENCOUNTER — TREATMENT (OUTPATIENT)
Dept: PHYSICAL THERAPY | Facility: CLINIC | Age: 38
End: 2023-11-09
Payer: MEDICARE

## 2023-11-09 DIAGNOSIS — M25.651 HIP STIFFNESS, RIGHT: Primary | ICD-10-CM

## 2023-11-09 DIAGNOSIS — M16.11 OSTEOARTHRITIS OF RIGHT HIP, UNSPECIFIED OSTEOARTHRITIS TYPE: ICD-10-CM

## 2023-11-09 NOTE — PROGRESS NOTES
Physical Therapy Progress Note/Reassessment  [address] [place] [address] , [address], [place] [address]    Patient: Sourav Tamez   : 1985  Diagnosis/ICD-10 Code:  Hip stiffness, right [M25.651]  Referring practitioner: Judy Bassett MD  Date of Initial Evaluation:  Type: THERAPY  Noted: 2023  Patient seen for 3 sessions      Subjective:   Visit Diagnoses:    ICD-10-CM ICD-9-CM   1. Hip stiffness, right  M25.651 719.55   2. Osteoarthritis of right hip, unspecified osteoarthritis type  M16.11 715.95         Sourav Tamez reports his R hip is feeling tight. States he has not been doing HEP consistently.   Subjective Questionnaire: Oxford hip score = 43/48, indicating 90% ability  Clinical Progress: improved  Home Program Compliance: Yes  Treatment has included: therapeutic exercise, neuromuscular re-education, manual therapy, therapeutic activity, and gait training      Objective : R hip IR ROM = 10 deg    Gait: R hip ER with toes turned out at ~45 deg.    See Exercise, Manual, and Modality Logs for complete treatment.     Assessment/Plan : Increased R hip IR ROM since start of PT. Continues to ambulate with R hip in ER and toes turned outward. Good tolerance to ther ex progression and pt demonstrated good ability keeping R hip in neutral position during ambulation on treadmill.    Plan Goals: STG to be met in 4 wks  1. Pt will be safe, independent, and compliant with HEP to optimize recovery and self management of symptoms.  - Not met  2. Pt will demonstrate at least 10 deg R hip IR for normalized gait pattern. - MET    LTG To be met in 12 wks  1. Pt will demonstrate at least 15 deg R hip IR for normalized gait pattern. - not met  2. Pt will ambulate at least 50' without R lE ER to reduce fall risk. - Not met  3. Pt will improve [place] Hip from 75% function to at least 85% function. - MET       Recommendations: Continue as planned  Timeframe: 3 months at 1x/wk  Prognosis to achieve goals: good      Timed:         Manual  Therapy:    10     mins  63774;     Therapeutic Exercise:    20     mins  92825;     Neuromuscular Tabitha:        mins  77384;    Therapeutic Activity:      10    mins  88942;     Gait Training:           mins  62648;     Ultrasound:          mins  58287;    Ionto                                   mins   25175  Self Care                            mins   83944      Un-Timed:  Electrical Stimulation:         mins  50673 ( );  Dry Needling          mins self-pay  Traction          mins 27523  Canalith Repos         mins 03441      Timed Treatment:   40   mins   Total Treatment:     40   mins    PT Signature: Zo Jim PT, CLT  PT License: [place] Lic # [id number]

## 2023-11-14 ENCOUNTER — TREATMENT (OUTPATIENT)
Dept: PHYSICAL THERAPY | Facility: CLINIC | Age: 38
End: 2023-11-14
Payer: MEDICARE

## 2023-11-14 DIAGNOSIS — M25.651 HIP STIFFNESS, RIGHT: Primary | ICD-10-CM

## 2023-11-14 DIAGNOSIS — M16.11 OSTEOARTHRITIS OF RIGHT HIP, UNSPECIFIED OSTEOARTHRITIS TYPE: ICD-10-CM

## 2023-11-14 PROCEDURE — 97530 THERAPEUTIC ACTIVITIES: CPT | Performed by: PHYSICAL THERAPIST

## 2023-11-14 PROCEDURE — 97110 THERAPEUTIC EXERCISES: CPT | Performed by: PHYSICAL THERAPIST

## 2023-11-14 PROCEDURE — 97140 MANUAL THERAPY 1/> REGIONS: CPT | Performed by: PHYSICAL THERAPIST

## 2023-11-14 NOTE — PROGRESS NOTES
Physical Therapy Daily Treatment Note      Patient: Sourav Tamez   : 1985  Diagnosis/ICD-10 Code:  Hip stiffness, right [M25.651]   Problems Addressed this Visit    None  Visit Diagnoses       Hip stiffness, right    -  Primary    Osteoarthritis of right hip, unspecified osteoarthritis type              Diagnoses         Codes Comments    Hip stiffness, right    -  Primary ICD-10-CM: M25.651  ICD-9-CM: 719.55     Osteoarthritis of right hip, unspecified osteoarthritis type     ICD-10-CM: M16.11  ICD-9-CM: 715.95            Referring practitioner: Judy Bassett MD  Date of Initial Visit: Type: THERAPY  Noted: 2023  Today's Date: 2023    VISIT#: 4    Subjective Patient reports hips continue to feel tight but continues to perform stretches at home.       Objective     See Exercise, Manual, and Modality Logs for complete treatment.     Assessment/Plan Patient with good response to manual interventions and performed therapeutic exercise well with decreased symptoms reported. Patient demonstrated good tolerance to therapeutic exercise/activity well with only reports of increased fatigue.   Plan Goals: STG to be met in 4 wks  1. Pt will be safe, independent, and compliant with HEP to optimize recovery and self management of symptoms.  - Not met  2. Pt will demonstrate at least 10 deg R hip IR for normalized gait pattern. - MET     LTG To be met in 12 wks  1. Pt will demonstrate at least 15 deg R hip IR for normalized gait pattern. - not met  2. Pt will ambulate at least 50' without R lE ER to reduce fall risk. - Not met  3. Pt will improve [place] Hip from 75% function to at least 85% function. - MET    Progress per Plan of Care and Progress strengthening /stabilization /functional activity         Timed:         Manual Therapy:    10     mins  78743;     Therapeutic Exercise:    20     mins  71991;     Neuromuscular Tabitha:        mins  52499;    Therapeutic Activity:     10     mins  16385;     Gait  Training:           mins  91823;     Ultrasound:          mins  38436;    Ionto                                   mins   54884  Self Care                    _____  mins   63412  Canalith Repos                   mins  56387    Un-Timed:  Electrical Stimulation:         mins  49884 ( );  Dry Needling          mins self-pay   Traction          mins 39948    Timed Treatment:   40   mins   Total Treatment:     40   mins    Levar Baca PTA  [place] License [id number]  Physical Therapist Assistant

## 2023-11-21 ENCOUNTER — TREATMENT (OUTPATIENT)
Dept: PHYSICAL THERAPY | Facility: CLINIC | Age: 38
End: 2023-11-21
Payer: MEDICARE

## 2023-11-21 DIAGNOSIS — M25.651 HIP STIFFNESS, RIGHT: Primary | ICD-10-CM

## 2023-11-21 DIAGNOSIS — M16.11 OSTEOARTHRITIS OF RIGHT HIP, UNSPECIFIED OSTEOARTHRITIS TYPE: ICD-10-CM

## 2023-11-21 NOTE — PROGRESS NOTES
Physical Therapy Daily Treatment Note      Patient: Sourav Tamez   : 1985  Diagnosis/ICD-10 Code:  Hip stiffness, right [M25.651]   Problems Addressed this Visit    None  Visit Diagnoses       Hip stiffness, right    -  Primary    Osteoarthritis of right hip, unspecified osteoarthritis type              Diagnoses         Codes Comments    Hip stiffness, right    -  Primary ICD-10-CM: M25.651  ICD-9-CM: 719.55     Osteoarthritis of right hip, unspecified osteoarthritis type     ICD-10-CM: M16.11  ICD-9-CM: 715.95           Referring practitioner: Judy Bassett MD  Date of Initial Visit: Type: THERAPY  Noted: 2023  Today's Date: 2023    VISIT#: 5    Subjective : Pt states he had some trouble in R leg yesterday after having to lean over the dryer to tighten a couple screws. States he had some pain that lasted about 10-15 min and then went away. Indicates soreness was around hamstring tendons and that they wanted to give out.     Objective :     See Exercise, Manual, and Modality Logs for complete treatment.     Assessment/Plan : Increased R hip IR ROM since start of PT. Continues to ambulate with R hip in ER and toes turned outward. Good tolerance to ther ex progression and pt demonstrated good ability keeping R hip in neutral position during ambulation on treadmill.     Plan Goals: STG to be met in 4 wks  1. Pt will be safe, independent, and compliant with HEP to optimize recovery and self management of symptoms.  - Not met  2. Pt will demonstrate at least 10 deg R hip IR for normalized gait pattern. - MET     LTG To be met in 12 wks  1. Pt will demonstrate at least 15 deg R hip IR for normalized gait pattern. - not met  2. Pt will ambulate at least 50' without R lE ER to reduce fall risk. - Not met  3. Pt will improve [place] Hip from 75% function to at least 85% function. - MET    Progress per Plan of Care and Progress strengthening /stabilization /functional activity         Timed:          Manual Therapy:    10     mins  57437;     Therapeutic Exercise:    20     mins  44277;     Neuromuscular Tabitha:        mins  97423;    Therapeutic Activity:     10     mins  76853;     Gait Training:           mins  55918;     Ultrasound:          mins  55382;    Ionto                                   mins   72970  Self Care                            mins   40375    Un-Timed:  Electrical Stimulation:         mins  14088 ( );  Dry Needling          mins 71370/64176  Traction          mins 65264  Canalith Repos                   mins  69825  Low Eval          Mins  24723  Mod Eval          Mins  24923  High Eval                            Mins  01578  Re-Eval                               mins  24387    Timed Treatment:   40   mins   Total Treatment:     40   mins    Zo Jim, PT, CLT, Cert DN  Physical Therapist  [place] Lic # [id number]

## 2023-11-27 ENCOUNTER — OFFICE VISIT (OUTPATIENT)
Dept: FAMILY MEDICINE CLINIC | Facility: CLINIC | Age: 38
End: 2023-11-27
Payer: MEDICARE

## 2023-11-27 VITALS
TEMPERATURE: 97.5 F | BODY MASS INDEX: 28.61 KG/M2 | OXYGEN SATURATION: 98 % | HEIGHT: 66 IN | DIASTOLIC BLOOD PRESSURE: 60 MMHG | RESPIRATION RATE: 18 BRPM | SYSTOLIC BLOOD PRESSURE: 110 MMHG | WEIGHT: 178 LBS | HEART RATE: 94 BPM

## 2023-11-27 DIAGNOSIS — E03.9 HYPOTHYROIDISM (ACQUIRED): Chronic | ICD-10-CM

## 2023-11-27 DIAGNOSIS — Z12.5 SCREENING PSA (PROSTATE SPECIFIC ANTIGEN): ICD-10-CM

## 2023-11-27 DIAGNOSIS — R73.9 HYPERGLYCEMIA: ICD-10-CM

## 2023-11-27 DIAGNOSIS — Z23 NEED FOR INFLUENZA VACCINATION: ICD-10-CM

## 2023-11-27 DIAGNOSIS — E66.3 OVERWEIGHT WITH BODY MASS INDEX (BMI) OF 27 TO 27.9 IN ADULT: ICD-10-CM

## 2023-11-27 DIAGNOSIS — E78.2 MIXED HYPERLIPIDEMIA: Chronic | ICD-10-CM

## 2023-11-27 DIAGNOSIS — K92.1 BLOOD IN STOOL: ICD-10-CM

## 2023-11-27 DIAGNOSIS — Z28.21 23-POLYVALENT PNEUMOCOCCAL POLYSACCHARIDE VACCINE DECLINED: ICD-10-CM

## 2023-11-27 DIAGNOSIS — Z00.00 MEDICARE ANNUAL WELLNESS VISIT, SUBSEQUENT: Primary | ICD-10-CM

## 2023-11-27 DIAGNOSIS — B35.6 TINEA CRURIS: ICD-10-CM

## 2023-11-27 PROBLEM — R55 VASOVAGAL SYNCOPE: Status: RESOLVED | Noted: 2023-01-09 | Resolved: 2023-11-27

## 2023-11-27 LAB
BILIRUB BLD-MCNC: NEGATIVE MG/DL
CLARITY, POC: CLEAR
COLOR UR: YELLOW
EXPIRATION DATE: NORMAL
GLUCOSE UR STRIP-MCNC: NEGATIVE MG/DL
HBA1C MFR BLD: 5.2 % (ref 4.5–5.7)
KETONES UR QL: NEGATIVE
LEUKOCYTE EST, POC: NEGATIVE
Lab: NORMAL
NITRITE UR-MCNC: NEGATIVE MG/ML
PH UR: 6 [PH] (ref 5–8)
PROT UR STRIP-MCNC: NEGATIVE MG/DL
RBC # UR STRIP: NEGATIVE /UL
SP GR UR: 1 (ref 1–1.03)
UROBILINOGEN UR QL: NORMAL

## 2023-11-27 RX ORDER — CLOTRIMAZOLE 1 %
1 CREAM (GRAM) TOPICAL 2 TIMES DAILY
Qty: 60 G | Refills: 3 | Status: SHIPPED | OUTPATIENT
Start: 2023-11-27

## 2023-11-27 RX ORDER — NYSTATIN 100000 [USP'U]/G
POWDER TOPICAL 3 TIMES DAILY
Qty: 60 G | Refills: 1 | Status: SHIPPED | OUTPATIENT
Start: 2023-11-27

## 2023-11-27 NOTE — ASSESSMENT & PLAN NOTE
Recurrent  Start lotrimin    Diagnosis, treatment and and course discussed. Potential side effects discussed. Return if there is worsening or persistence of symptoms.      What Type Of Note Output Would You Prefer (Optional)?: Standard Output How Severe Are Your Spot(S)?: mild Have Your Spot(S) Been Treated In The Past?: has not been treated Hpi Title: Evaluation of Skin Lesions

## 2023-11-28 ENCOUNTER — TELEPHONE (OUTPATIENT)
Dept: FAMILY MEDICINE CLINIC | Facility: CLINIC | Age: 38
End: 2023-11-28
Payer: MEDICARE

## 2023-11-30 ENCOUNTER — TELEPHONE (OUTPATIENT)
Dept: FAMILY MEDICINE CLINIC | Facility: CLINIC | Age: 38
End: 2023-11-30
Payer: MEDICARE

## 2023-11-30 NOTE — TELEPHONE ENCOUNTER
----- Message from Sourav Tamez sent at 11/29/2023  1:05 AM EST -----  Regarding: Medicine question   Contact: [phone]  I also wanted to know about pneumonia shot I have been thinking about it  does dr. Bassett think I really need to get it??? and how big of a chance does she think I could get pneumonia from now and the rest of my life ???

## 2023-12-13 LAB
ALBUMIN SERPL-MCNC: 4.7 G/DL (ref 4.1–5.1)
ALBUMIN/GLOB SERPL: 2.1 {RATIO} (ref 1.2–2.2)
ALP SERPL-CCNC: 74 IU/L (ref 44–121)
ALT SERPL-CCNC: 58 IU/L (ref 0–44)
AST SERPL-CCNC: 24 IU/L (ref 0–40)
BASOPHILS # BLD AUTO: 0 X10E3/UL (ref 0–0.2)
BASOPHILS NFR BLD AUTO: 1 %
BILIRUB SERPL-MCNC: 0.4 MG/DL (ref 0–1.2)
BUN SERPL-MCNC: 11 MG/DL (ref 6–24)
BUN/CREAT SERPL: 13 (ref 9–20)
CALCIUM SERPL-MCNC: 9.6 MG/DL (ref 8.7–10.2)
CHLORIDE SERPL-SCNC: 100 MMOL/L (ref 96–106)
CHOLEST SERPL-MCNC: 201 MG/DL (ref 100–199)
CHOLEST/HDLC SERPL: 6.1 RATIO (ref 0–5)
CO2 SERPL-SCNC: 27 MMOL/L (ref 20–29)
CREAT SERPL-MCNC: 0.84 MG/DL (ref 0.76–1.27)
EGFRCR SERPLBLD CKD-EPI 2021: 113 ML/MIN/1.73
EOSINOPHIL # BLD AUTO: 0.2 X10E3/UL (ref 0–0.4)
EOSINOPHIL NFR BLD AUTO: 2 %
ERYTHROCYTE [DISTWIDTH] IN BLOOD BY AUTOMATED COUNT: 12.6 % (ref 11.6–15.4)
GLOBULIN SER CALC-MCNC: 2.2 G/DL (ref 1.5–4.5)
GLUCOSE SERPL-MCNC: 88 MG/DL (ref 70–99)
HCT VFR BLD AUTO: 46.2 % (ref 37.5–51)
HDLC SERPL-MCNC: 33 MG/DL
HGB BLD-MCNC: 15.8 G/DL (ref 13–17.7)
IMM GRANULOCYTES # BLD AUTO: 0 X10E3/UL (ref 0–0.1)
IMM GRANULOCYTES NFR BLD AUTO: 0 %
LDLC SERPL CALC-MCNC: 128 MG/DL (ref 0–99)
LYMPHOCYTES # BLD AUTO: 1.5 X10E3/UL (ref 0.7–3.1)
LYMPHOCYTES NFR BLD AUTO: 24 %
MCH RBC QN AUTO: 30.2 PG (ref 26.6–33)
MCHC RBC AUTO-ENTMCNC: 34.2 G/DL (ref 31.5–35.7)
MCV RBC AUTO: 88 FL (ref 79–97)
MONOCYTES # BLD AUTO: 0.5 X10E3/UL (ref 0.1–0.9)
MONOCYTES NFR BLD AUTO: 8 %
NEUTROPHILS # BLD AUTO: 4.2 X10E3/UL (ref 1.4–7)
NEUTROPHILS NFR BLD AUTO: 65 %
PLATELET # BLD AUTO: 205 X10E3/UL (ref 150–450)
POTASSIUM SERPL-SCNC: 4.6 MMOL/L (ref 3.5–5.2)
PROT SERPL-MCNC: 6.9 G/DL (ref 6–8.5)
RBC # BLD AUTO: 5.23 X10E6/UL (ref 4.14–5.8)
SODIUM SERPL-SCNC: 141 MMOL/L (ref 134–144)
TRIGL SERPL-MCNC: 224 MG/DL (ref 0–149)
TSH SERPL DL<=0.005 MIU/L-ACNC: 4.9 UIU/ML (ref 0.45–4.5)
VLDLC SERPL CALC-MCNC: 40 MG/DL (ref 5–40)
WBC # BLD AUTO: 6.4 X10E3/UL (ref 3.4–10.8)

## 2023-12-15 NOTE — PROGRESS NOTES
Subjective   Souarv Tamez is a 38 y.o. male. Presents to [facility]    Chief Complaint   Patient presents with    Hyperlipidemia       History of Present Illness  Patient is here to follow up on lab work.   Hyperlipidemia  This is a chronic problem. The current episode started more than 1 year ago. He has no history of diabetes or obesity. Pertinent negatives include no chest pain. Current antihyperlipidemic treatment includes statins. The current treatment provides moderate improvement of lipids. Risk factors for coronary artery disease include dyslipidemia, hypertension and male sex.        I personally reviewed and updated the patient's allergies, medications, problem list, and past medical, surgical, social, and family history. I have reviewed and confirmed the accuracy of the History of Present Illness and Review of Symptoms as documented by the MA/LPN/RN. Judy Bassett MD    Allergies:  Allergies   Allergen Reactions    Amoxicillin Swelling and Shortness Of Breath    Cetirizine Swelling    Diphenhydramine Swelling    Doxycycline Swelling    Escitalopram Swelling     Swelling of throat    Fexofenadine Anaphylaxis    Loratadine Swelling    Montelukast Swelling    Penicillins Shortness Of Breath    Prednisone Swelling    Trazodone Swelling    Codeine Myalgia    Latex Rash    Mucinex [Guaifenesin Er] Cough    Olanzapine Rash    Robitussin (Alcohol Free) [Guaifenesin] Cough       Social History:  Social History     Socioeconomic History    Marital status:    Tobacco Use    Smoking status: Never    Smokeless tobacco: Never   Vaping Use    Vaping Use: Never used   Substance and Sexual Activity    Alcohol use: Yes     Comment: Once in a great while maybe only once or twice a year    Drug use: No    Sexual activity: Defer       Family History:  Family History   Problem Relation Age of Onset    Hypertension Mother     Hyperlipidemia Mother     Hyperlipidemia Maternal Grandmother     Asthma  Daughter     Asthma Daughter        Past Medical History :  Patient Active Problem List   Diagnosis    Perennial allergic rhinitis    ADD (attention deficit disorder)    Asperger's syndrome    Mixed hyperlipidemia    Sliding hiatal hernia    Anxiety    Depression, major, recurrent, mild    Gastroesophageal reflux disease without esophagitis    Hypothyroidism (acquired)    Mild intermittent asthma without complication    Labile mood    Chronic idiopathic constipation    Overweight with body mass index (BMI) of 27 to 27.9 in adult    Tinea cruris    Medicare annual wellness visit, subsequent    Neck pain    Trapezius muscle spasm    History of use of hearing aid in right ear    Right hip pain    Hyperglycemia    23-polyvalent pneumococcal polysaccharide vaccine declined       Medication List:    Current Outpatient Medications:     albuterol sulfate  (90 Base) MCG/ACT inhaler, Inhale 2 puffs Every 4 (Four) Hours As Needed for Wheezing., Disp: 18 g, Rfl: 3    baclofen (LIORESAL) 10 MG tablet, TAKE 1 TABLET BY MOUTH AT NIGHT AS NEEDED FOR MUSCLE SPASMS, Disp: 30 tablet, Rfl: 0    budesonide-formoterol (Symbicort) 80-4.5 MCG/ACT inhaler, Inhale 2 puffs 2 (Two) Times a Day., Disp: 6.9 g, Rfl: 12    busPIRone (BUSPAR) 15 MG tablet, Take 1 tablet by mouth 2 (two) times a day., Disp: , Rfl:     clotrimazole (LOTRIMIN) 1 % cream, Apply 1 application  topically to the appropriate area as directed 2 (Two) Times a Day., Disp: 60 g, Rfl: 3    EPINEPHrine (EPIPEN) 0.3 MG/0.3ML solution auto-injector injection, , Disp: , Rfl:     ibuprofen (ADVIL,MOTRIN) 800 MG tablet, Take 1 tablet by mouth Every 8 (Eight) Hours As Needed for Mild Pain or Moderate Pain., Disp: 90 tablet, Rfl: 0    lamoTRIgine (LaMICtal) 200 MG tablet, TAKE 1 TABLET BY MOUTH DAILY, Disp: 30 tablet, Rfl: 1    metoprolol succinate XL (TOPROL-XL) 25 MG 24 hr tablet, TAKE 1 TABLET BY MOUTH DAILY, Disp: 90 tablet, Rfl: 1    multivitamin with minerals tablet  "tablet, Take 1 tablet by mouth Daily., Disp: , Rfl:     nystatin (MYCOSTATIN) 442107 UNIT/GM powder, Apply  topically to the appropriate area as directed 3 (Three) Times a Day., Disp: 60 g, Rfl: 1    omeprazole (priLOSEC) 40 MG capsule, TAKE 1 CAPSULE BY MOUTH DAILY, Disp: 90 capsule, Rfl: 0    polyethylene glycol (MIRALAX) 17 GM/SCOOP powder, Take 17 g by mouth Daily., Disp: 225 g, Rfl: 2    Triamcinolone Acetonide (NASACORT) 55 MCG/ACT nasal inhaler, 2 sprays into the nostril(s) as directed by provider Daily., Disp: , Rfl:     atorvastatin (LIPITOR) 20 MG tablet, Take 1 tablet by mouth Daily., Disp: 90 tablet, Rfl: 3    Past Surgical History:  Past Surgical History:   Procedure Laterality Date    TONSILLECTOMY  1988         Physical Exam:      Vital Signs:    Vitals:    12/18/23 1535   BP: 116/82   Pulse: 91   Resp: 18   Temp: 97.1 °F (36.2 °C)   SpO2: 99%        /82 (BP Location: Right arm, Patient Position: Sitting, Cuff Size: Adult)   Pulse 91   Temp 97.1 °F (36.2 °C) (Temporal)   Resp 18   Ht 167.6 cm (66\")   Wt 80.9 kg (178 lb 6.4 oz)   SpO2 99% Comment: room air  BMI 28.79 kg/m²     Wt Readings from Last 3 Encounters:   12/18/23 80.9 kg (178 lb 6.4 oz)   11/27/23 80.7 kg (178 lb)   09/01/23 77.6 kg (171 lb)       Result Review :   The following data was reviewed by: Judy Bassett MD on 12/18/2023:  CMP          1/18/2023    12:45 12/12/2023    12:13   CMP   Glucose 99  88    BUN 12  11    Creatinine 0.87  0.84    Sodium 140  141    Potassium 4.5  4.6    Chloride 102  100    Calcium 9.0  9.6    Total Protein 6.6  6.9    Albumin 4.8  4.7    Globulin 1.8  2.2    Total Bilirubin 0.3  0.4    Alkaline Phosphatase 63  74    AST (SGOT) 21  24    ALT (SGPT) 33  58    BUN/Creatinine Ratio 14  13      Lipid Panel          12/12/2023    12:13   Lipid Panel   Total Cholesterol 201    Triglycerides 224    HDL Cholesterol 33    VLDL Cholesterol 40    LDL Cholesterol  128             WBC   Date Value Ref " Range Status   12/12/2023 6.4 3.4 - 10.8 x10E3/uL Final     RBC   Date Value Ref Range Status   12/12/2023 5.23 4.14 - 5.80 x10E6/uL Final     Hemoglobin   Date Value Ref Range Status   12/12/2023 15.8 13.0 - 17.7 g/dL Final     Hematocrit   Date Value Ref Range Status   12/12/2023 46.2 37.5 - 51.0 % Final     MCV   Date Value Ref Range Status   12/12/2023 88 79 - 97 fL Final     MCH   Date Value Ref Range Status   12/12/2023 30.2 26.6 - 33.0 pg Final     MCHC   Date Value Ref Range Status   12/12/2023 34.2 31.5 - 35.7 g/dL Final     RDW   Date Value Ref Range Status   12/12/2023 12.6 11.6 - 15.4 % Final     MPV   Date Value Ref Range Status   03/28/2019 10.8 7.5 - 12.5 fL Final     Platelets   Date Value Ref Range Status   12/12/2023 205 150 - 450 x10E3/uL Final     Neutrophil Rel %   Date Value Ref Range Status   12/12/2023 65 Not Estab. % Final     Lymphocyte Rel %   Date Value Ref Range Status   12/12/2023 24 Not Estab. % Final     Monocyte Rel %   Date Value Ref Range Status   12/12/2023 8 Not Estab. % Final     Eosinophil Rel %   Date Value Ref Range Status   12/12/2023 2 Not Estab. % Final     Basophil Rel %   Date Value Ref Range Status   12/12/2023 1 Not Estab. % Final     Neutrophils Absolute   Date Value Ref Range Status   12/12/2023 4.2 1.4 - 7.0 x10E3/uL Final     Lymphocytes Absolute   Date Value Ref Range Status   12/12/2023 1.5 0.7 - 3.1 x10E3/uL Final     Monocytes Absolute   Date Value Ref Range Status   12/12/2023 0.5 0.1 - 0.9 x10E3/uL Final     Eosinophils Absolute   Date Value Ref Range Status   12/12/2023 0.2 0.0 - 0.4 x10E3/uL Final     Basophils Absolute   Date Value Ref Range Status   12/12/2023 0.0 0.0 - 0.2 x10E3/uL Final     TSH          1/18/2023    12:45 12/12/2023    12:13   TSH   TSH 3.740  4.900        Physical Exam  Vitals reviewed.   Constitutional:       Appearance: Normal appearance. He is well-developed.   HENT:      Head: Normocephalic and atraumatic.   Eyes:      General:          Right eye: No discharge.         Left eye: No discharge.   Cardiovascular:      Rate and Rhythm: Normal rate and regular rhythm.      Heart sounds: Normal heart sounds. No murmur heard.     No friction rub. No gallop.   Pulmonary:      Effort: Pulmonary effort is normal. No respiratory distress.      Breath sounds: Normal breath sounds. No wheezing or rales.   Skin:     General: Skin is warm and dry.      Findings: No rash.   Neurological:      Mental Status: He is alert and oriented to person, place, and time.      Coordination: Coordination normal.      Gait: Gait normal.   Psychiatric:         Behavior: Behavior is cooperative.         Assessment and Plan:  Problems Addressed this Visit          Cardiac and Vasculature    Mixed hyperlipidemia - Primary (Chronic)     Lipid abnormalities are worsening.  Pharmacotherapy as ordered.  Lipids will be reassessed in 3 months.  Diagnosis, treatment and and course discussed. Potential side effects discussed. Return if there is worsening or persistence of symptoms.            Relevant Medications    atorvastatin (LIPITOR) 20 MG tablet       Other    Overweight with body mass index (BMI) of 27 to 27.9 in adult     Diagnoses         Codes Comments    Mixed hyperlipidemia    -  Primary ICD-10-CM: E78.2  ICD-9-CM: 272.2     Overweight with body mass index (BMI) of 27 to 27.9 in adult     ICD-10-CM: E66.3, Z68.27  ICD-9-CM: 278.02, V85.23                     An After Visit Summary and PPPS were given to the patient.

## 2023-12-18 ENCOUNTER — OFFICE VISIT (OUTPATIENT)
Dept: FAMILY MEDICINE CLINIC | Facility: CLINIC | Age: 38
End: 2023-12-18
Payer: MEDICARE

## 2023-12-18 VITALS
WEIGHT: 178.4 LBS | DIASTOLIC BLOOD PRESSURE: 82 MMHG | RESPIRATION RATE: 18 BRPM | OXYGEN SATURATION: 99 % | BODY MASS INDEX: 28.67 KG/M2 | HEIGHT: 66 IN | TEMPERATURE: 97.1 F | SYSTOLIC BLOOD PRESSURE: 116 MMHG | HEART RATE: 91 BPM

## 2023-12-18 DIAGNOSIS — E66.3 OVERWEIGHT WITH BODY MASS INDEX (BMI) OF 27 TO 27.9 IN ADULT: ICD-10-CM

## 2023-12-18 DIAGNOSIS — E78.2 MIXED HYPERLIPIDEMIA: Primary | Chronic | ICD-10-CM

## 2023-12-18 PROCEDURE — 1160F RVW MEDS BY RX/DR IN RCRD: CPT | Performed by: FAMILY MEDICINE

## 2023-12-18 PROCEDURE — 99214 OFFICE O/P EST MOD 30 MIN: CPT | Performed by: FAMILY MEDICINE

## 2023-12-18 PROCEDURE — 1159F MED LIST DOCD IN RCRD: CPT | Performed by: FAMILY MEDICINE

## 2023-12-18 RX ORDER — PRAVASTATIN SODIUM 40 MG
40 TABLET ORAL DAILY
Qty: 90 TABLET | Refills: 3 | Status: SHIPPED | OUTPATIENT
Start: 2023-12-18 | End: 2023-12-18 | Stop reason: SDUPTHER

## 2023-12-18 RX ORDER — ATORVASTATIN CALCIUM 20 MG/1
20 TABLET, FILM COATED ORAL DAILY
Qty: 90 TABLET | Refills: 3 | Status: SHIPPED | OUTPATIENT
Start: 2023-12-18

## 2023-12-27 NOTE — ASSESSMENT & PLAN NOTE
Lipid abnormalities are worsening.  Pharmacotherapy as ordered.  Lipids will be reassessed in 3 months.  Diagnosis, treatment and and course discussed. Potential side effects discussed. Return if there is worsening or persistence of symptoms.

## 2024-01-19 ENCOUNTER — DOCUMENTATION (OUTPATIENT)
Dept: PHYSICAL THERAPY | Facility: CLINIC | Age: 39
End: 2024-01-19
Payer: MEDICARE

## 2024-01-19 DIAGNOSIS — J45.20 MILD INTERMITTENT ASTHMA WITHOUT COMPLICATION: ICD-10-CM

## 2024-01-19 NOTE — PROGRESS NOTES
Discharge Summary  Discharge Summary from Physical Therapy Report                               [address], [address], [place]  [address]    Dates  PT visit: 9/20/2023-11/21/2023     VISIT#: 5      Discharge Status of Patient: See Progress Note dated 11/9      1. Pt will be safe, independent, and compliant with HEP to optimize recovery and self management of symptoms.  - Not met  2. Pt will demonstrate at least 10 deg R hip IR for normalized gait pattern. - MET     LTG To be met in 12 wks  1. Pt will demonstrate at least 15 deg R hip IR for normalized gait pattern. - not met  2. Pt will ambulate at least 50' without R lE ER to reduce fall risk. - Not met  3. Pt will improve [place] Hip from 75% function to at least 85% function. - MET  Goals: Partially Met    Discharge Plan: Continue with current home exercise program as instructed    Comments Pt canceled remaining appts d/t illness    Date of Discharge 1/19/24        Brianna Britt, PT, DPT, cert. DN  Physical Therapist  [place] Lic# [id number]

## 2024-01-22 RX ORDER — BUDESONIDE AND FORMOTEROL FUMARATE DIHYDRATE 80; 4.5 UG/1; UG/1
2 AEROSOL RESPIRATORY (INHALATION) 2 TIMES DAILY
Qty: 10.2 G | Refills: 12 | Status: SHIPPED | OUTPATIENT
Start: 2024-01-22

## 2024-02-05 DIAGNOSIS — M25.561 ACUTE PAIN OF RIGHT KNEE: ICD-10-CM

## 2024-02-11 RX ORDER — IBUPROFEN 800 MG/1
800 TABLET ORAL EVERY 8 HOURS PRN
Qty: 90 TABLET | Refills: 0 | Status: SHIPPED | OUTPATIENT
Start: 2024-02-11

## 2024-03-12 ENCOUNTER — TELEPHONE (OUTPATIENT)
Dept: FAMILY MEDICINE CLINIC | Facility: CLINIC | Age: 39
End: 2024-03-12
Payer: MEDICARE

## 2024-03-12 DIAGNOSIS — E78.2 MIXED HYPERLIPIDEMIA: Primary | ICD-10-CM

## 2024-03-12 DIAGNOSIS — E03.9 HYPOTHYROIDISM (ACQUIRED): ICD-10-CM

## 2024-03-12 DIAGNOSIS — R73.9 HYPERGLYCEMIA: ICD-10-CM

## 2024-03-12 NOTE — TELEPHONE ENCOUNTER
----- Message from Thalia Gleason MA sent at 12/18/2023  3:47 PM EST -----  ORDER 3 MONTHS LABS CALL PT AND LET HIM KNOW

## 2024-03-14 LAB
ALBUMIN SERPL-MCNC: 4.7 G/DL (ref 4.1–5.1)
ALBUMIN/GLOB SERPL: 2.1 {RATIO} (ref 1.2–2.2)
ALP SERPL-CCNC: 79 IU/L (ref 44–121)
ALT SERPL-CCNC: 45 IU/L (ref 0–44)
AST SERPL-CCNC: 23 IU/L (ref 0–40)
BILIRUB SERPL-MCNC: 0.4 MG/DL (ref 0–1.2)
BUN SERPL-MCNC: 12 MG/DL (ref 6–24)
BUN/CREAT SERPL: 13 (ref 9–20)
CALCIUM SERPL-MCNC: 9.5 MG/DL (ref 8.7–10.2)
CHLORIDE SERPL-SCNC: 102 MMOL/L (ref 96–106)
CHOLEST SERPL-MCNC: 140 MG/DL (ref 100–199)
CHOLEST/HDLC SERPL: 4 RATIO (ref 0–5)
CO2 SERPL-SCNC: 25 MMOL/L (ref 20–29)
CREAT SERPL-MCNC: 0.94 MG/DL (ref 0.76–1.27)
EGFRCR SERPLBLD CKD-EPI 2021: 104 ML/MIN/1.73
GLOBULIN SER CALC-MCNC: 2.2 G/DL (ref 1.5–4.5)
GLUCOSE SERPL-MCNC: 108 MG/DL (ref 70–99)
HBA1C MFR BLD: 5.4 % (ref 4.8–5.6)
HDLC SERPL-MCNC: 35 MG/DL
LDLC SERPL CALC-MCNC: 85 MG/DL (ref 0–99)
POTASSIUM SERPL-SCNC: 4.7 MMOL/L (ref 3.5–5.2)
PROT SERPL-MCNC: 6.9 G/DL (ref 6–8.5)
SODIUM SERPL-SCNC: 143 MMOL/L (ref 134–144)
TRIGL SERPL-MCNC: 108 MG/DL (ref 0–149)
TSH SERPL DL<=0.005 MIU/L-ACNC: 5.31 UIU/ML (ref 0.45–4.5)
VLDLC SERPL CALC-MCNC: 20 MG/DL (ref 5–40)

## 2024-04-04 NOTE — PROGRESS NOTES
Subjective   Sourav Tamez is a 39 y.o. male. Presents to [facility]    Chief Complaint   Patient presents with    Hyperlipidemia    Hypothyroidism       Hyperlipidemia  This is a chronic problem. The current episode started more than 1 year ago. Exacerbating diseases include hypothyroidism. He has no history of diabetes or obesity. Pertinent negatives include no chest pain. Current antihyperlipidemic treatment includes statins. The current treatment provides moderate improvement of lipids. Risk factors for coronary artery disease include dyslipidemia, hypertension and male sex.   Hypothyroidism  This is a chronic problem. The current episode started more than 1 month ago. Pertinent negatives include no chest pain. He has tried nothing for the symptoms.        I personally reviewed and updated the patient's allergies, medications, problem list, and past medical, surgical, social, and family history. I have reviewed and confirmed the accuracy of the History of Present Illness and Review of Symptoms as documented by the MA/LPN/RN. Judy Bassett MD    Allergies:  Allergies   Allergen Reactions    Amoxicillin Swelling and Shortness Of Breath    Cetirizine Swelling    Diphenhydramine Swelling    Doxycycline Swelling    Escitalopram Swelling     Swelling of throat    Fexofenadine Anaphylaxis    Loratadine Swelling    Montelukast Swelling    Penicillins Shortness Of Breath    Prednisone Swelling    Trazodone Swelling    Codeine Myalgia    Latex Rash    Mucinex [Guaifenesin Er] Cough    Olanzapine Rash    Robitussin (Alcohol Free) [Guaifenesin] Cough       Social History:  Social History     Socioeconomic History    Marital status:    Tobacco Use    Smoking status: Never    Smokeless tobacco: Never   Vaping Use    Vaping status: Never Used   Substance and Sexual Activity    Alcohol use: Yes     Comment: Once in a great while maybe only once or twice a year    Drug use: No    Sexual activity: Defer        Family History:  Family History   Problem Relation Age of Onset    Hypertension Mother     Hyperlipidemia Mother     Hyperlipidemia Maternal Grandmother     Asthma Daughter     Asthma Daughter        Past Medical History :  Patient Active Problem List   Diagnosis    Perennial allergic rhinitis    ADD (attention deficit disorder)    Asperger's syndrome    Mixed hyperlipidemia    Sliding hiatal hernia    Anxiety    Moderate major depression    Gastroesophageal reflux disease without esophagitis    Hypothyroidism (acquired)    Mild intermittent asthma without complication    Labile mood    Chronic idiopathic constipation    Overweight with body mass index (BMI) of 28 to 28.9 in adult    Tinea cruris    Medicare annual wellness visit, subsequent    Neck pain    Trapezius muscle spasm    History of use of hearing aid in right ear    Right hip pain    Hyperglycemia    23-polyvalent pneumococcal polysaccharide vaccine declined    Acute pain of right shoulder    Testicle swelling       Medication List:    Current Outpatient Medications:     albuterol sulfate  (90 Base) MCG/ACT inhaler, Inhale 2 puffs Every 4 (Four) Hours As Needed for Wheezing., Disp: 18 g, Rfl: 3    atorvastatin (LIPITOR) 20 MG tablet, Take 1 tablet by mouth Daily., Disp: 90 tablet, Rfl: 3    baclofen (LIORESAL) 10 MG tablet, TAKE 1 TABLET BY MOUTH AT NIGHT AS NEEDED FOR MUSCLE SPASMS, Disp: 30 tablet, Rfl: 0    budesonide-formoterol (SYMBICORT) 80-4.5 MCG/ACT inhaler, INHALE 2 PUFFS BY MOUTH TWICE DAILY, Disp: 10.2 g, Rfl: 12    busPIRone (BUSPAR) 15 MG tablet, Take 1 tablet by mouth 2 (two) times a day., Disp: , Rfl:     clotrimazole (LOTRIMIN) 1 % cream, Apply 1 application  topically to the appropriate area as directed 2 (Two) Times a Day., Disp: 60 g, Rfl: 3    EPINEPHrine (EPIPEN) 0.3 MG/0.3ML solution auto-injector injection, , Disp: , Rfl:     ibuprofen (ADVIL,MOTRIN) 800 MG tablet, TAKE 1 TABLET BY MOUTH EVERY 8 HOURS AS NEEDED FOR  "MILD PAIN OR MODERATE PAIN, Disp: 90 tablet, Rfl: 0    lamoTRIgine (LaMICtal) 200 MG tablet, TAKE 1 TABLET BY MOUTH DAILY, Disp: 30 tablet, Rfl: 1    metoprolol succinate XL (TOPROL-XL) 25 MG 24 hr tablet, TAKE 1 TABLET BY MOUTH DAILY, Disp: 90 tablet, Rfl: 1    multivitamin with minerals tablet tablet, Take 1 tablet by mouth Daily., Disp: , Rfl:     nystatin (MYCOSTATIN) [id number] UNIT/GM powder, Apply  topically to the appropriate area as directed 3 (Three) Times a Day., Disp: 60 g, Rfl: 1    omeprazole (priLOSEC) 40 MG capsule, TAKE 1 CAPSULE BY MOUTH DAILY, Disp: 90 capsule, Rfl: 0    polyethylene glycol (MIRALAX) 17 GM/SCOOP powder, Take 17 g by mouth Daily., Disp: 225 g, Rfl: 2    Triamcinolone Acetonide (NASACORT) 55 MCG/ACT nasal inhaler, 2 sprays into the nostril(s) as directed by provider Daily., Disp: , Rfl:     Past Surgical History:  Past Surgical History:   Procedure Laterality Date    TONSILLECTOMY  1988         Physical Exam:      Vital Signs:    Vitals:    04/11/24 1602   BP: 124/84   Pulse: 108   Resp: 18   Temp: 97.7 °F (36.5 °C)   SpO2: 99%        /84 (BP Location: Right arm, Patient Position: Sitting, Cuff Size: Adult)   Pulse 108   Temp 97.7 °F (36.5 °C) (Temporal)   Resp 18   Ht 167.6 cm (66\")   Wt 79.5 kg (175 lb 3.2 oz)   SpO2 99% Comment: room air  BMI 28.28 kg/m²     Wt Readings from Last 3 Encounters:   04/11/24 79.5 kg (175 lb 3.2 oz)   12/18/23 80.9 kg (178 lb 6.4 oz)   11/27/23 80.7 kg (178 lb)       Result Review :   The following data was reviewed by: Judy Bassett MD on 04/11/2024:  CMP          12/12/2023    12:13 3/13/2024    11:51   CMP   Glucose 88  108    BUN 11  12    Creatinine 0.84  0.94    Sodium 141  143    Potassium 4.6  4.7    Chloride 100  102    Calcium 9.6  9.5    Total Protein 6.9  6.9    Albumin 4.7  4.7    Globulin 2.2  2.2    Total Bilirubin 0.4  0.4    Alkaline Phosphatase 74  79    AST (SGOT) 24  23    ALT (SGPT) 58  45    BUN/Creatinine Ratio 13  " 13      Lipid Panel          12/12/2023    12:13 3/13/2024    11:51   Lipid Panel   Total Cholesterol 201  140    Triglycerides 224  108    HDL Cholesterol 33  35    VLDL Cholesterol 40  20    LDL Cholesterol  128  85       Latest Reference Range & Units 03/13/24 11:51   Hemoglobin A1C 4.8 - 5.6 % 5.4   TSH Baseline 0.450 - 4.500 uIU/mL 5.310 (H)   (H): Data is abnormally high             Physical Exam  Vitals reviewed.   Constitutional:       Appearance: Normal appearance. He is well-developed.   HENT:      Head: Normocephalic and atraumatic.   Eyes:      General:         Right eye: No discharge.         Left eye: No discharge.   Cardiovascular:      Rate and Rhythm: Normal rate and regular rhythm.      Heart sounds: Normal heart sounds. No murmur heard.     No friction rub. No gallop.   Pulmonary:      Effort: Pulmonary effort is normal. No respiratory distress.      Breath sounds: Normal breath sounds. No wheezing or rales.   Genitourinary:      Skin:     General: Skin is warm and dry.      Findings: No rash.   Neurological:      Mental Status: He is alert and oriented to person, place, and time.      Coordination: Coordination normal.      Gait: Gait normal.   Psychiatric:         Behavior: Behavior is cooperative.         Assessment and Plan:  Problems Addressed this Visit          Cardiac and Vasculature    Mixed hyperlipidemia - Primary (Chronic)      Lipid abnormalities are improving with lifestyle modifications    Plan:  Continue current lifestyle changes    Discussed medication dosage, use, side effects, and goals of treatment in detail.    Counseled patient on lifestyle modifications to help control hyperlipidemia.     Patient Treatment Goals:   LDL goal is under 130    Followup in 3 months.            Endocrine and Metabolic    Hypothyroidism (acquired) (Chronic)     recheck         Relevant Orders    Thyroid Panel With TSH (Completed)       Genitourinary and Reproductive     Testicle swelling     Left  side  Non tender, mild   Will get u/s         Relevant Orders    US Scrotum & Testicles       Mental Health    Moderate major depression     Patient's depression is a recurrent episode that is moderate without psychosis. Depression is active and stable.    Plan:   Continue current medication therapy     Followup at the next regular appointment.             Musculoskeletal and Injuries    Acute pain of right shoulder    Relevant Orders    XR Shoulder 2+ View Right (Completed)       Other    Overweight with body mass index (BMI) of 28 to 28.9 in adult     Diagnoses         Codes Comments    Mixed hyperlipidemia    -  Primary ICD-10-CM: E78.2  ICD-9-CM: 272.2     Overweight with body mass index (BMI) of 28 to 28.9 in adult     ICD-10-CM: E66.3, Z68.28  ICD-9-CM: 278.02, V85.24     Hypothyroidism (acquired)     ICD-10-CM: E03.9  ICD-9-CM: 244.9     Moderate major depression     ICD-10-CM: F32.1  ICD-9-CM: 296.22     Acute pain of right shoulder     ICD-10-CM: M25.511  ICD-9-CM: 719.41     Testicle swelling     ICD-10-CM: N50.89  ICD-9-CM: 608.86                          An After Visit Summary and PPPS were given to the patient.

## 2024-04-11 ENCOUNTER — OFFICE VISIT (OUTPATIENT)
Dept: FAMILY MEDICINE CLINIC | Facility: CLINIC | Age: 39
End: 2024-04-11
Payer: MEDICARE

## 2024-04-11 ENCOUNTER — HOSPITAL ENCOUNTER (OUTPATIENT)
Dept: GENERAL RADIOLOGY | Facility: HOSPITAL | Age: 39
Discharge: HOME | End: 2024-04-11
Payer: MEDICARE

## 2024-04-11 VITALS
TEMPERATURE: 97.7 F | HEIGHT: 66 IN | DIASTOLIC BLOOD PRESSURE: 84 MMHG | BODY MASS INDEX: 28.16 KG/M2 | RESPIRATION RATE: 18 BRPM | WEIGHT: 175.2 LBS | SYSTOLIC BLOOD PRESSURE: 124 MMHG | OXYGEN SATURATION: 99 % | HEART RATE: 108 BPM

## 2024-04-11 DIAGNOSIS — E03.9 HYPOTHYROIDISM (ACQUIRED): ICD-10-CM

## 2024-04-11 DIAGNOSIS — N50.89 TESTICLE SWELLING: ICD-10-CM

## 2024-04-11 DIAGNOSIS — E78.2 MIXED HYPERLIPIDEMIA: Primary | ICD-10-CM

## 2024-04-11 DIAGNOSIS — E66.3 OVERWEIGHT WITH BODY MASS INDEX (BMI) OF 28 TO 28.9 IN ADULT: ICD-10-CM

## 2024-04-11 DIAGNOSIS — M25.511 ACUTE PAIN OF RIGHT SHOULDER: ICD-10-CM

## 2024-04-11 DIAGNOSIS — F32.1 MODERATE MAJOR DEPRESSION: ICD-10-CM

## 2024-04-11 PROCEDURE — 73030 X-RAY EXAM OF SHOULDER: CPT

## 2024-04-11 PROCEDURE — 1159F MED LIST DOCD IN RCRD: CPT | Performed by: FAMILY MEDICINE

## 2024-04-11 PROCEDURE — 99214 OFFICE O/P EST MOD 30 MIN: CPT | Performed by: FAMILY MEDICINE

## 2024-04-11 PROCEDURE — 1160F RVW MEDS BY RX/DR IN RCRD: CPT | Performed by: FAMILY MEDICINE

## 2024-04-18 LAB
FT4I SERPL CALC-MCNC: 1.9 (ref 1.2–4.9)
T3RU NFR SERPL: 27 % (ref 24–39)
T4 SERPL-MCNC: 7.2 UG/DL (ref 4.5–12)
TSH SERPL DL<=0.005 MIU/L-ACNC: 4.28 UIU/ML (ref 0.45–4.5)

## 2024-04-20 DIAGNOSIS — B35.6 TINEA CRURIS: ICD-10-CM

## 2024-04-21 PROBLEM — F32.1 MODERATE MAJOR DEPRESSION: Status: ACTIVE | Noted: 2020-03-03

## 2024-04-21 NOTE — ASSESSMENT & PLAN NOTE
Lipid abnormalities are improving with lifestyle modifications    Plan:  Continue current lifestyle changes    Discussed medication dosage, use, side effects, and goals of treatment in detail.    Counseled patient on lifestyle modifications to help control hyperlipidemia.     Patient Treatment Goals:   LDL goal is under 130    Followup in 3 months.

## 2024-04-22 ENCOUNTER — HOSPITAL ENCOUNTER (OUTPATIENT)
Dept: ULTRASOUND IMAGING | Facility: HOSPITAL | Age: 39
Discharge: HOME | End: 2024-04-22
Admitting: FAMILY MEDICINE
Payer: MEDICARE

## 2024-04-22 DIAGNOSIS — N50.89 TESTICLE SWELLING: ICD-10-CM

## 2024-04-22 PROCEDURE — 76870 US EXAM SCROTUM: CPT

## 2024-04-22 PROCEDURE — 93976 VASCULAR STUDY: CPT

## 2024-04-22 RX ORDER — CLOTRIMAZOLE 1 %
CREAM (GRAM) TOPICAL 2 TIMES DAILY
Qty: 60 G | Refills: 3 | Status: SHIPPED | OUTPATIENT
Start: 2024-04-22

## 2024-04-22 RX ORDER — ALBUTEROL SULFATE 90 UG/1
AEROSOL, METERED RESPIRATORY (INHALATION)
Qty: 18 G | Refills: 3 | Status: SHIPPED | OUTPATIENT
Start: 2024-04-22

## 2024-05-20 ENCOUNTER — TELEPHONE (OUTPATIENT)
Dept: FAMILY MEDICINE CLINIC | Facility: CLINIC | Age: 39
End: 2024-05-20
Payer: MEDICARE

## 2024-05-20 DIAGNOSIS — N50.89 TESTICLE SWELLING: Primary | ICD-10-CM

## 2024-05-29 NOTE — PROGRESS NOTES
Date of Office Visit: 2024  Encounter Provider: Dr. Alvin Arriaza  Place of Service: [facility] [place]  Patient Name: Sourav Tamez  :1985  Judy Bassett MD    Chief Complaint   Patient presents with    Coronary Artery Disease    Hyperlipidemia    Follow-up     History of Present Illness    I am pleased to see Mr. tamez in my office today  as a follow-up     As you know, patient is 39-year-old white gentleman whose past medical history is  significant for autism, Aspergers syndrome who came today for follow-up     Patient reports that he is having symptom of palpitation from last few months.  Patient underwent Holter monitor in your office.  Holter monitor showed narrow complex tachycardia with heart rate greater than 150..  Possibility of SVT cannot be excluded but most likely it seems to be sinus tachycardia.  In May 2018, patient underwent echocardiogram which showed EF of 55-60%.  Trace mitral regurgitation was noted.  In 2020, patient underwent event monitor and there was no significant arrhythmia burden was noted.    Patient came today for follow-up.  Patient denies any symptom of chest pain or tightness or heaviness.  No orthopnea PND no syncope or presyncope.  No leg edema noted.    EKG showed normal sinus rhythm with heart rate of 81 bpm.    Patient symptom of palpitations are contained.  Continue Toprol-XL.  Patient is advised to follow-up with PCP.  I will see the patient as needed.  No active cardiac issue presently.      Past Medical History:   Diagnosis Date    Acute bronchitis due to other specified organisms     Impression: Increase fluids. Tylenol/motrin for pain or fever. Medication and medication adverse effects discussed. Follow-up 5-7 days for reevaluation if not improved or sooner if needed. Allergic component. Start prednisone.    Acute non-recurrent maxillary sinusitis     Impression: mild. Increase fluids. Tylenol/motrin for pain or fever. Medication  and medication adverse effects discussed. Follow-up 5-7 days for reevaluation if not improved or sooner if needed.    Acute non-seasonal allergic rhinitis     Impression: With post nasal drip. Cause of URI symptoms antihistamines discussed Diagnosis, treatment and course discussed. Discussed medication, dosing and adverse effects. Return if there is worsening or persistance of symptoms    Acute pain of right knee     Impression: discussed nsaids and stretches exam was negative strain most likely    ADHD (attention deficit hyperactivity disorder)     Advance care planning     Alcohol screening     Allergic 1990    Anxiety and depression     Impression: Good social support, no suicidal or homicidal ideation. Diagnosis and treatment discussed. Discussed counseling. Discussed medication, dosing and adverse effects. Return in 4 weeks    Arthritis 2023    Asperger syndrome     Impression: stable 9/24/18    Coronary artery disease 2016    Cough     Impression: Check CXR    Depression, major, recurrent, mild     Impression: seeing psych    External hemorrhoid     Impression: none bleeding now and small discussed home care    Folliculitis     Impression: Diagnosis, treatment and course discussed. Discussed medication, dosing and adverse effects. Return if there is worsening or persistance of symptoms    Gastroesophageal reflux disease without esophagitis     Impression: he has seen GI. refill medication    Headache 1995    Hiatal hernia     HL (hearing loss) 2023    Hypothyroidism     Impression: recheck today    Impetigo     Labile mood     Impression: question if he has some bipolar disorder? Given phone number.    Medicare annual wellness visit, subsequent     With abnormal findings.     Mild intermittent asthma with (acute) exacerbation     Impression: mildly exacerbated. no wheeze on exam    Mixed hyperlipidemia     Impression: will check labs when fasting.    Overweight (BMI 25.0-29.9)     Right foot pain 06/25/2020     Right hip pain     Impression: discussed nsaids and stretches exam was negative strain most likely    Screening for depression     Screening PSA (prostate specific antigen)     Substance abuse     Story: multiple psych meds    Visual impairment 1989         Past Surgical History:   Procedure Laterality Date    TONSILLECTOMY  1988           Current Outpatient Medications:     albuterol sulfate  (90 Base) MCG/ACT inhaler, INHALE 2 PUFFS INTO THE LUNGS EVERY 4 HOURS AS NEEDED FOR WHEEZING, Disp: 18 g, Rfl: 3    atorvastatin (LIPITOR) 20 MG tablet, Take 1 tablet by mouth Daily., Disp: 90 tablet, Rfl: 3    baclofen (LIORESAL) 10 MG tablet, TAKE 1 TABLET BY MOUTH AT NIGHT AS NEEDED FOR MUSCLE SPASMS, Disp: 30 tablet, Rfl: 0    budesonide-formoterol (SYMBICORT) 80-4.5 MCG/ACT inhaler, INHALE 2 PUFFS BY MOUTH TWICE DAILY, Disp: 10.2 g, Rfl: 12    busPIRone (BUSPAR) 15 MG tablet, Take 1 tablet by mouth 2 (two) times a day., Disp: , Rfl:     clotrimazole (LOTRIMIN) 1 % cream, APPLY TOPICALLY TO THE AFFECTED AREA TWICE DAILY AS DIRECTED, Disp: 60 g, Rfl: 3    EPINEPHrine (EPIPEN) 0.3 MG/0.3ML solution auto-injector injection, , Disp: , Rfl:     ibuprofen (ADVIL,MOTRIN) 800 MG tablet, TAKE 1 TABLET BY MOUTH EVERY 8 HOURS AS NEEDED FOR MILD PAIN OR MODERATE PAIN, Disp: 90 tablet, Rfl: 0    lamoTRIgine (LaMICtal) 200 MG tablet, TAKE 1 TABLET BY MOUTH DAILY, Disp: 30 tablet, Rfl: 1    metoprolol succinate XL (TOPROL-XL) 25 MG 24 hr tablet, TAKE 1 TABLET BY MOUTH DAILY, Disp: 90 tablet, Rfl: 1    nystatin (MYCOSTATIN) [id number] UNIT/GM powder, Apply  topically to the appropriate area as directed 3 (Three) Times a Day., Disp: 60 g, Rfl: 1    omeprazole (priLOSEC) 40 MG capsule, TAKE 1 CAPSULE BY MOUTH DAILY, Disp: 90 capsule, Rfl: 0    polyethylene glycol (MIRALAX) 17 GM/SCOOP powder, Take 17 g by mouth Daily., Disp: 225 g, Rfl: 2    Triamcinolone Acetonide (NASACORT) 55 MCG/ACT nasal inhaler, 2 sprays into the nostril(s) as  "directed by provider Daily., Disp: , Rfl:       Social History     Socioeconomic History    Marital status:    Tobacco Use    Smoking status: Never    Smokeless tobacco: Never   Vaping Use    Vaping status: Never Used   Substance and Sexual Activity    Alcohol use: Yes     Comment: Once in a great while maybe only once or twice a year    Drug use: No    Sexual activity: Defer         Review of Systems   Constitutional: Negative for chills and fever.   HENT:  Negative for ear discharge and nosebleeds.    Eyes:  Negative for discharge and redness.   Cardiovascular:  Negative for chest pain, orthopnea, palpitations, paroxysmal nocturnal dyspnea and syncope.   Respiratory:  Negative for cough, shortness of breath and wheezing.    Endocrine: Negative for heat intolerance.   Skin:  Negative for rash.   Musculoskeletal:  Negative for arthritis and myalgias.   Gastrointestinal:  Negative for abdominal pain, melena, nausea and vomiting.   Genitourinary:  Negative for dysuria and hematuria.   Neurological:  Negative for dizziness, light-headedness, numbness and tremors.   Psychiatric/Behavioral:  Negative for depression. The patient is not nervous/anxious.        Procedures      ECG 12 Lead    Date/Time: 5/31/2024 2:37 PM  Performed by: Alvin Arriaza MD    Authorized by: Alvin Arriaza MD  Comparison: compared with previous ECG   Similar to previous ECG  Rhythm: sinus rhythm    Clinical impression: normal ECG          ECG 12 Lead    (Results Pending)           Objective:    /76 (BP Location: Right arm, Patient Position: Sitting, Cuff Size: Large Adult)   Pulse 81   Resp 18   Ht 167.6 cm (65.98\")   Wt 79.4 kg (175 lb)   SpO2 99%   BMI 28.26 kg/m²         Constitutional:       Appearance: Well-developed.   Eyes:      General: No scleral icterus.        Right eye: No discharge.   HENT:      Head: Normocephalic and atraumatic.   Neck:      Thyroid: No thyromegaly.      Lymphadenopathy: No cervical adenopathy. "   Pulmonary:      Effort: Pulmonary effort is normal. No respiratory distress.      Breath sounds: Normal breath sounds. No wheezing. No rales.   Cardiovascular:      Normal rate. Regular rhythm.      No gallop.    Edema:     Peripheral edema absent.   Abdominal:      Tenderness: There is no abdominal tenderness.   Skin:     Findings: No erythema or rash.   Neurological:      Mental Status: Alert and oriented to person, place, and time.             Assessment:       Diagnosis Plan   1. Mixed hyperlipidemia  ECG 12 Lead      2. Sinus tachycardia                 Plan:       MDM:    1.  Sinus tachycardia:    With Toprol-XL heart rate is controlled I would continue low-dose beta-blocker therapy    3.  Mixed hyperlipidemia:    Patient is on Lipitor.  Recent LDL is 85 which is desirable I will see the patient as needed

## 2024-05-31 ENCOUNTER — OFFICE VISIT (OUTPATIENT)
Dept: CARDIOLOGY | Facility: CLINIC | Age: 39
End: 2024-05-31
Payer: MEDICARE

## 2024-05-31 VITALS
HEIGHT: 66 IN | OXYGEN SATURATION: 99 % | SYSTOLIC BLOOD PRESSURE: 125 MMHG | RESPIRATION RATE: 18 BRPM | WEIGHT: 175 LBS | BODY MASS INDEX: 28.12 KG/M2 | DIASTOLIC BLOOD PRESSURE: 76 MMHG | HEART RATE: 81 BPM

## 2024-05-31 DIAGNOSIS — R00.0 SINUS TACHYCARDIA: ICD-10-CM

## 2024-05-31 DIAGNOSIS — E78.2 MIXED HYPERLIPIDEMIA: Primary | Chronic | ICD-10-CM

## 2024-05-31 PROCEDURE — 1160F RVW MEDS BY RX/DR IN RCRD: CPT | Performed by: INTERNAL MEDICINE

## 2024-05-31 PROCEDURE — 1159F MED LIST DOCD IN RCRD: CPT | Performed by: INTERNAL MEDICINE

## 2024-05-31 PROCEDURE — 99213 OFFICE O/P EST LOW 20 MIN: CPT | Performed by: INTERNAL MEDICINE

## 2024-05-31 PROCEDURE — 93000 ELECTROCARDIOGRAM COMPLETE: CPT | Performed by: INTERNAL MEDICINE

## 2024-06-05 ENCOUNTER — HOSPITAL ENCOUNTER (OUTPATIENT)
Dept: ULTRASOUND IMAGING | Facility: HOSPITAL | Age: 39
Discharge: HOME | End: 2024-06-05
Payer: MEDICARE

## 2024-06-05 DIAGNOSIS — N50.89 TESTICLE SWELLING: ICD-10-CM

## 2024-06-05 PROCEDURE — 76870 US EXAM SCROTUM: CPT

## 2024-06-17 DIAGNOSIS — K59.04 CHRONIC IDIOPATHIC CONSTIPATION: ICD-10-CM

## 2024-06-17 RX ORDER — POLYETHYLENE GLYCOL 3350 17 G/17G
POWDER, FOR SOLUTION ORAL
Qty: 238 G | Refills: 2 | Status: SHIPPED | OUTPATIENT
Start: 2024-06-17

## 2024-06-28 DIAGNOSIS — K59.04 CHRONIC IDIOPATHIC CONSTIPATION: ICD-10-CM

## 2024-06-28 RX ORDER — POLYETHYLENE GLYCOL 3350 17 G/17G
17 POWDER, FOR SOLUTION ORAL DAILY
Qty: 238 G | Refills: 2 | Status: SHIPPED | OUTPATIENT
Start: 2024-06-28

## 2024-07-15 DIAGNOSIS — B35.6 TINEA CRURIS: ICD-10-CM

## 2024-07-15 RX ORDER — CLOTRIMAZOLE 1 %
CREAM (GRAM) TOPICAL 2 TIMES DAILY
Qty: 60 G | Refills: 3 | Status: SHIPPED | OUTPATIENT
Start: 2024-07-15

## 2024-07-19 RX ORDER — ALBUTEROL SULFATE 90 UG/1
1 AEROSOL, METERED RESPIRATORY (INHALATION) EVERY 4 HOURS PRN
Qty: 18 G | Refills: 3 | Status: SHIPPED | OUTPATIENT
Start: 2024-07-19

## 2024-07-28 DIAGNOSIS — E78.2 MIXED HYPERLIPIDEMIA: Chronic | ICD-10-CM

## 2024-07-28 DIAGNOSIS — R07.9 CHEST PAIN, UNSPECIFIED TYPE: ICD-10-CM

## 2024-07-29 RX ORDER — METOPROLOL SUCCINATE 25 MG/1
25 TABLET, EXTENDED RELEASE ORAL DAILY
Qty: 90 TABLET | Refills: 1 | Status: SHIPPED | OUTPATIENT
Start: 2024-07-29

## 2024-07-29 RX ORDER — PRAVASTATIN SODIUM 10 MG
10 TABLET ORAL DAILY
Qty: 30 TABLET | Refills: 12 | OUTPATIENT
Start: 2024-07-29

## 2024-09-05 ENCOUNTER — TELEPHONE (OUTPATIENT)
Dept: FAMILY MEDICINE CLINIC | Facility: CLINIC | Age: 39
End: 2024-09-05
Payer: MEDICARE

## 2024-09-05 DIAGNOSIS — E03.9 HYPOTHYROIDISM (ACQUIRED): ICD-10-CM

## 2024-09-05 DIAGNOSIS — E78.2 MIXED HYPERLIPIDEMIA: ICD-10-CM

## 2024-09-05 DIAGNOSIS — N50.89 TESTICLE SWELLING: Primary | ICD-10-CM

## 2024-09-20 ENCOUNTER — HOSPITAL ENCOUNTER (OUTPATIENT)
Dept: ULTRASOUND IMAGING | Facility: HOSPITAL | Age: 39
Discharge: HOME | End: 2024-09-20
Payer: MEDICARE

## 2024-09-20 DIAGNOSIS — N50.89 TESTICLE SWELLING: ICD-10-CM

## 2024-09-20 PROCEDURE — 76870 US EXAM SCROTUM: CPT

## 2024-10-30 NOTE — PROGRESS NOTES
Called Ms. Boyle to see how she was doing. She stated she is still struggling with neuropathy and is taking B12 and B6 to help it. She stated she will see medical oncology on Nov 12th will continue to follow up with them regarding her neuropathy. She also had some billing questions and encouraged her to call Michael, financial navigator, to work through those questions. She stated she would do that. She was thankful for the call and will reach out if any questions or needs arise.   Subjective   Sourav Tamez is a 35 y.o. male.         Constipation   This is a new problem. The current episode started 1 to 4 weeks ago. The problem is unchanged. His stool frequency is 2 to 3 times per week. The patient is on a high fiber diet. He exercises regularly. There has not been adequate water intake. Associated symptoms include bloating. Pertinent negatives include no abdominal pain, diarrhea, fecal incontinence, fever, nausea or vomiting. He has tried nothing for the symptoms. The treatment provided no relief.   Obesity   This is a chronic problem. The current episode started more than 1 year ago. The problem occurs constantly. The problem has been unchanged. Pertinent negatives include no abdominal pain, arthralgias, chest pain, chills, congestion, coughing, diaphoresis, fatigue, fever, headaches, joint swelling, nausea, neck pain, numbness, rash, sore throat, swollen glands, urinary symptoms, visual change or vomiting. Nothing aggravates the symptoms. He has tried nothing for the symptoms.        The following portions of the patient's history were reviewed and updated as appropriate: allergies, current medications, past family history, past medical history, past social history, past surgical history and problem list.    Allergies:  Allergies   Allergen Reactions   • Amoxicillin Swelling and Shortness Of Breath   • Cetirizine Swelling   • Diphenhydramine Swelling   • Doxycycline Swelling   • Escitalopram Swelling     Swelling of throat   • Fexofenadine Anaphylaxis   • Loratadine Swelling   • Montelukast Swelling   • Penicillins Shortness Of Breath   • Prednisone Swelling   • Trazodone Swelling   • Codeine Myalgia   • Latex Rash   • Mucinex [Guaifenesin Er] Cough   • Olanzapine Rash   • Robitussin (Alcohol Free) [Guaifenesin] Cough       Social History:  Social History     Socioeconomic History   • Marital status:      Spouse name: Not on file   • Number of children: Not on file   • Years of  education: Not on file   • Highest education level: Not on file   Tobacco Use   • Smoking status: Never Smoker   • Smokeless tobacco: Never Used   Substance and Sexual Activity   • Alcohol use: Yes   • Drug use: No       Family History:  Family History   Problem Relation Age of Onset   • Hypertension Mother    • Hyperlipidemia Mother    • Hyperlipidemia Maternal Grandmother        Past Medical History :  Patient Active Problem List   Diagnosis   • Perennial allergic rhinitis   • Cough   • ADD (attention deficit disorder)   • Asperger's syndrome   • High cholesterol   • Sliding hiatal hernia   • Acute pain of right knee   • Anxiety   • Depression, major, recurrent, mild (CMS/HCC)   • Folliculitis   • Gastroesophageal reflux disease without esophagitis   • Hypothyroidism (acquired)   • Screening for depression   • Substance abuse (CMS/HCC)   • Mild intermittent asthma without complication   • Labile mood   • Impetigo   • Acute foot pain, left   • Paroxysmal SVT (supraventricular tachycardia) (CMS/HCC)   • Encounter for routine adult physical exam with abnormal findings   • Chronic idiopathic constipation   • Overweight       Medication List:  Outpatient Encounter Medications as of 7/13/2020   Medication Sig Dispense Refill   • ALBUTEROL SULFATE  (90 Base) MCG/ACT inhaler INHALE 2 PUFFS BY MOUTH EVERY 4 TO 6 HOURS AS NEEDED. MAX 12 PUFFS DAILY. 18 g 3   • busPIRone (BUSPAR) 7.5 MG tablet TAKE 1 TABLET BY MOUTH DAILY FOR 7 DAYS, THEN 1 TABLET TWICE DAILY 60 tablet 3   • clotrimazole (LOTRIMIN) 1 % cream Apply  topically to the appropriate area as directed 2 (Two) Times a Day. 45 g 3   • fluticasone-salmeterol (ADVAIR DISKUS) 250-50 MCG/DOSE DISKUS Inhale 1 puff 2 (Two) Times a Day. 1 each 0   • ketoconazole (NIZORAL) 2 % cream Daily.     • lamoTRIgine (LaMICtal) 150 MG tablet Take 150 mg by mouth Daily.  5   • metoprolol succinate XL (TOPROL-XL) 25 MG 24 hr tablet TAKE 1 TABLET BY MOUTH EVERY DAY 90 tablet 3   •  "omeprazole (priLOSEC) 40 MG capsule As Needed.  1   • Triamcinolone Acetonide (Nasacort Allergy 24HR) 55 MCG/ACT nasal inhaler 2 sprays into the nostril(s) as directed by provider Daily.     • Polyethylene Glycol 1000 powder 17 g Daily. 527 bottle 1   • [DISCONTINUED] clotrimazole (LOTRIMIN) 1 % cream RAKAN EXT AA BID  1     No facility-administered encounter medications on file as of 7/13/2020.        Past Surgical History:  Past Surgical History:   Procedure Laterality Date   • TONSILLECTOMY  1988       Review of Systems:  Review of Systems   Constitutional: Negative for activity change, chills, diaphoresis, fatigue and fever.   HENT: Negative for congestion, ear pain, rhinorrhea, sinus pressure, sore throat, swollen glands and voice change.    Eyes: Negative for visual disturbance.   Respiratory: Negative for cough and shortness of breath.    Cardiovascular: Negative for chest pain.   Gastrointestinal: Positive for bloating and constipation. Negative for abdominal pain, diarrhea, nausea and vomiting.   Endocrine: Negative for cold intolerance and heat intolerance.   Genitourinary: Negative for frequency and urgency.   Musculoskeletal: Negative for arthralgias, joint swelling and neck pain.   Skin: Negative for rash.   Neurological: Negative for syncope and numbness.   Hematological: Does not bruise/bleed easily.   Psychiatric/Behavioral: Negative for depressed mood. The patient is not nervous/anxious.        I have reviewed and confirmed the accuracy of the ROS as documented by the MA/LPN/RN Judy Bassett MD    Vital Signs:  Visit Vitals  /82   Pulse 97   Temp 98 °F (36.7 °C)   Resp 16   Ht 168.9 cm (66.5\")   Wt 85.5 kg (188 lb 9.6 oz)   SpO2 100%   BMI 29.98 kg/m²       Physical Exam   Constitutional: He is oriented to person, place, and time. He appears well-developed and well-nourished. He is cooperative.   Cardiovascular: Normal rate, regular rhythm and normal heart sounds. Exam reveals no gallop and " no friction rub.   No murmur heard.  Pulmonary/Chest: Effort normal and breath sounds normal. He has no wheezes. He has no rales.   Abdominal: Soft. Bowel sounds are normal. He exhibits no distension. There is no tenderness. There is no rebound and no guarding.   Neurological: He is alert and oriented to person, place, and time. Coordination normal.   Skin: Skin is warm and dry.   Psychiatric: He has a normal mood and affect.   Vitals reviewed.      Assessment and Plan:  Problem List Items Addressed This Visit        Digestive    Chronic idiopathic constipation    Current Assessment & Plan     Increase fluids and fiber  Diagnosis, treatment and and course discussed. Potential side effects discussed. Return if there is worsening or persistence of symptoms.            Relevant Medications    Polyethylene Glycol 1000 powder       Other    Overweight - Primary    Current Assessment & Plan     Discussed stopping fast food. Start getting active.                  An After Visit Summary and PPPS were given to the patient.    I wore protective equipment throughout this patient encounter to include mask, gloves and eye protection. Hand hygiene was performed before donning protective equipment and after removal when leaving the room.

## 2024-11-11 ENCOUNTER — OFFICE VISIT (OUTPATIENT)
Dept: FAMILY MEDICINE CLINIC | Facility: CLINIC | Age: 39
End: 2024-11-11
Payer: MEDICARE

## 2024-11-11 VITALS
WEIGHT: 178.8 LBS | HEIGHT: 60 IN | RESPIRATION RATE: 18 BRPM | TEMPERATURE: 97.7 F | SYSTOLIC BLOOD PRESSURE: 132 MMHG | HEART RATE: 111 BPM | BODY MASS INDEX: 35.1 KG/M2 | OXYGEN SATURATION: 98 % | DIASTOLIC BLOOD PRESSURE: 82 MMHG

## 2024-11-11 DIAGNOSIS — E66.812 CLASS 2 OBESITY DUE TO EXCESS CALORIES WITHOUT SERIOUS COMORBIDITY WITH BODY MASS INDEX (BMI) OF 38.0 TO 38.9 IN ADULT: ICD-10-CM

## 2024-11-11 DIAGNOSIS — E66.09 CLASS 2 OBESITY DUE TO EXCESS CALORIES WITHOUT SERIOUS COMORBIDITY WITH BODY MASS INDEX (BMI) OF 38.0 TO 38.9 IN ADULT: ICD-10-CM

## 2024-11-11 DIAGNOSIS — B35.6 TINEA CRURIS: ICD-10-CM

## 2024-11-11 DIAGNOSIS — L21.9 SEBORRHEIC DERMATITIS: Primary | ICD-10-CM

## 2024-11-11 PROCEDURE — G2211 COMPLEX E/M VISIT ADD ON: HCPCS | Performed by: FAMILY MEDICINE

## 2024-11-11 PROCEDURE — 1160F RVW MEDS BY RX/DR IN RCRD: CPT | Performed by: FAMILY MEDICINE

## 2024-11-11 PROCEDURE — 1159F MED LIST DOCD IN RCRD: CPT | Performed by: FAMILY MEDICINE

## 2024-11-11 PROCEDURE — 1126F AMNT PAIN NOTED NONE PRSNT: CPT | Performed by: FAMILY MEDICINE

## 2024-11-11 PROCEDURE — 99213 OFFICE O/P EST LOW 20 MIN: CPT | Performed by: FAMILY MEDICINE

## 2024-11-11 RX ORDER — FLUTICASONE PROPIONATE 50 MCG
2 SPRAY, SUSPENSION (ML) NASAL DAILY
COMMUNITY
Start: 2024-11-09

## 2024-11-11 NOTE — PROGRESS NOTES
Subjective   Sourav Tamez is a 39 y.o. male. Presents to [facility]    Chief Complaint   Patient presents with    Rash       Rash  This is a new problem. The current episode started in the past 7 days. The problem has been comes and goes since onset. The affected locations include the face. The rash is characterized by dryness, itchiness, peeling and redness. He was exposed to nothing. Associated symptoms include fatigue. Pertinent negatives include no congestion or cough. (chills) Treatments tried: clotrimizole. The treatment provided no relief.      Similar to wife. He also has flaky scalp also    He also has had some cold chills. Its gone.       I personally reviewed and updated the patient's allergies, medications, problem list, and past medical, surgical, social, and family history. I have reviewed and confirmed the accuracy of the History of Present Illness and Review of Symptoms as documented by the MA/LPN/RN. Judy Bassett MD    Allergies:  Allergies   Allergen Reactions    Amoxicillin Swelling and Shortness Of Breath    Cetirizine Swelling    Diphenhydramine Swelling    Doxycycline Swelling    Escitalopram Swelling     Swelling of throat    Fexofenadine Anaphylaxis    Loratadine Swelling    Montelukast Swelling    Penicillins Shortness Of Breath    Prednisone Swelling    Trazodone Swelling    Codeine Myalgia    Latex Rash    Mucinex [Guaifenesin Er] Cough    Olanzapine Rash    Robitussin (Alcohol Free) [Guaifenesin] Cough       Social History:  Social History     Socioeconomic History    Marital status:    Tobacco Use    Smoking status: Never     Passive exposure: Past (as a child)    Smokeless tobacco: Never   Vaping Use    Vaping status: Never Used   Substance and Sexual Activity    Alcohol use: Yes     Comment: Once in a great while maybe only once or twice a year    Drug use: No    Sexual activity: Defer       Family History:  Family History   Problem Relation Age of Onset     Hypertension Mother     Hyperlipidemia Mother     Hyperlipidemia Maternal Grandmother     Asthma Daughter     Asthma Daughter        Past Medical History :  Patient Active Problem List   Diagnosis    Perennial allergic rhinitis    ADD (attention deficit disorder)    Asperger's syndrome    Mixed hyperlipidemia    Sliding hiatal hernia    Anxiety    Moderate major depression    Gastroesophageal reflux disease without esophagitis    Hypothyroidism (acquired)    Mild intermittent asthma without complication    Labile mood    Chronic idiopathic constipation    Overweight with body mass index (BMI) of 28 to 28.9 in adult    Tinea cruris    Medicare annual wellness visit, subsequent    Neck pain    Trapezius muscle spasm    History of use of hearing aid in right ear    Right hip pain    Hyperglycemia    23-polyvalent pneumococcal polysaccharide vaccine declined    Acute pain of right shoulder    Testicle swelling    Class 2 obesity due to excess calories without serious comorbidity with body mass index (BMI) of 38.0 to 38.9 in adult    Seborrheic dermatitis       Medication List:    Current Outpatient Medications:     albuterol sulfate  (90 Base) MCG/ACT inhaler, Inhale 1 puff Every 4 (Four) Hours As Needed for Wheezing., Disp: 18 g, Rfl: 3    atorvastatin (LIPITOR) 20 MG tablet, Take 1 tablet by mouth Daily., Disp: 90 tablet, Rfl: 3    baclofen (LIORESAL) 10 MG tablet, TAKE 1 TABLET BY MOUTH AT NIGHT AS NEEDED FOR MUSCLE SPASMS, Disp: 30 tablet, Rfl: 0    budesonide-formoterol (SYMBICORT) 80-4.5 MCG/ACT inhaler, INHALE 2 PUFFS BY MOUTH TWICE DAILY, Disp: 10.2 g, Rfl: 12    busPIRone (BUSPAR) 15 MG tablet, Take 1 tablet by mouth 2 (two) times a day., Disp: , Rfl:     clotrimazole (LOTRIMIN) 1 % cream, APPLY TOPICALLY TO THE AFFECTED AREA TWICE DAILY AS DIRECTED, Disp: 60 g, Rfl: 3    EPINEPHrine (EPIPEN) 0.3 MG/0.3ML solution auto-injector injection, , Disp: , Rfl:     fluticasone (FLONASE) 50 MCG/ACT nasal spray, 2  "sprays by Each Nare route Daily., Disp: , Rfl:     ibuprofen (ADVIL,MOTRIN) 800 MG tablet, TAKE 1 TABLET BY MOUTH EVERY 8 HOURS AS NEEDED FOR MILD PAIN OR MODERATE PAIN, Disp: 90 tablet, Rfl: 0    lamoTRIgine (LaMICtal) 200 MG tablet, TAKE 1 TABLET BY MOUTH DAILY, Disp: 30 tablet, Rfl: 1    metoprolol succinate XL (TOPROL-XL) 25 MG 24 hr tablet, TAKE 1 TABLET BY MOUTH DAILY, Disp: 90 tablet, Rfl: 1    nystatin (MYCOSTATIN) 610955 UNIT/GM powder, Apply  topically to the appropriate area as directed 3 (Three) Times a Day., Disp: 60 g, Rfl: 1    polyethylene glycol (MIRALAX) 17 GM/SCOOP powder, Take 17 g by mouth Daily., Disp: 238 g, Rfl: 2    Past Surgical History:  Past Surgical History:   Procedure Laterality Date    TONSILLECTOMY  1988         Physical Exam:      Vital Signs:    Vitals:    11/11/24 1552   BP: 132/82   Pulse: 111   Resp: 18   Temp: 97.7 °F (36.5 °C)   SpO2: 98%        /82 (BP Location: Right arm, Patient Position: Sitting, Cuff Size: Adult)   Pulse 111   Temp 97.7 °F (36.5 °C) (Temporal)   Resp 18   Ht 144.8 cm (57\")   Wt 81.1 kg (178 lb 12.8 oz)   SpO2 98% Comment: ra  BMI 38.69 kg/m²     Wt Readings from Last 3 Encounters:   11/11/24 81.1 kg (178 lb 12.8 oz)   07/16/24 75.8 kg (167 lb)   05/31/24 79.4 kg (175 lb)       Result Review :                Physical Exam  Vitals reviewed.   Constitutional:       Appearance: Normal appearance. He is well-developed.   HENT:      Head: Normocephalic and atraumatic.      Right Ear: External ear normal. No decreased hearing noted. No tenderness. No middle ear effusion. Tympanic membrane is not injected, erythematous, retracted or bulging.      Left Ear: External ear normal. No decreased hearing noted. No tenderness.  No middle ear effusion. Tympanic membrane is not injected, erythematous, retracted or bulging.      Nose: Nose normal. No rhinorrhea.      Mouth/Throat:      Pharynx: No oropharyngeal exudate or posterior oropharyngeal erythema. "   Eyes:      General:         Right eye: No discharge.         Left eye: No discharge.   Cardiovascular:      Rate and Rhythm: Normal rate and regular rhythm.      Heart sounds: Normal heart sounds. No murmur heard.     No friction rub. No gallop.   Pulmonary:      Effort: Pulmonary effort is normal. No respiratory distress.      Breath sounds: Normal breath sounds. No wheezing or rales.   Lymphadenopathy:      Cervical: No cervical adenopathy.   Skin:     General: Skin is warm and dry.      Comments: Forehead chin and nasal labial folds with oily papules and flaking    Flaky scalp noted   Neurological:      Mental Status: He is alert and oriented to person, place, and time.      Coordination: Coordination normal.      Gait: Gait normal.   Psychiatric:         Behavior: Behavior is cooperative.         Assessment and Plan:  Problems Addressed this Visit          Endocrine and Metabolic    Class 2 obesity due to excess calories without serious comorbidity with body mass index (BMI) of 38.0 to 38.9 in adult       Skin    Tinea cruris     Recurrent    Will have him see derm             Relevant Orders    Ambulatory Referral to Dermatology (Completed)    Seborrheic dermatitis - Primary     Discussed care at home. He is not able to get sulfacetamide\  Discussed using selsun blue or an anti-dandruff shampoo to wash face  Discussed moisturizing as well         Relevant Orders    Ambulatory Referral to Dermatology (Completed)     Diagnoses         Codes Comments    Seborrheic dermatitis    -  Primary ICD-10-CM: L21.9  ICD-9-CM: 690.10     Class 2 obesity due to excess calories without serious comorbidity with body mass index (BMI) of 38.0 to 38.9 in adult     ICD-10-CM: E66.812, E66.09, Z68.38  ICD-9-CM: 278.00, V85.38     Tinea cruris     ICD-10-CM: B35.6  ICD-9-CM: 110.3              Class 2 Severe Obesity (BMI >=35 and <=39.9). Obesity-related health conditions include the following: none. Obesity is worsening. BMI is is  above average; BMI management plan is completed. We discussed low calorie, low carb based diet program, portion control, and increasing exercise.           An After Visit Summary and PPPS were given to the patient.       This document is intended for medical expert use only. Reading of this document by patients and/or patient's family without participating medical staff guidance may result in misinterpretation and unintended morbidity. Any interpretation of such data is the responsibility of the patient and/or family member responsible for the patient in concert with their primary or specialist providers, not to be left for sources of online searches such as [employer]artolook, [employer] or similar queries. Relying on these approaches to knowledge may result in misinterpretation, misguided goals of care and even death should patients or family members try recommendations outside of the realm of professional medical care.

## 2024-11-13 NOTE — ASSESSMENT & PLAN NOTE
Discussed care at home. He is not able to get sulfacetamide\  Discussed using selsun blue or an anti-dandruff shampoo to wash face  Discussed moisturizing as well

## 2024-12-05 ENCOUNTER — TELEPHONE (OUTPATIENT)
Dept: FAMILY MEDICINE CLINIC | Facility: CLINIC | Age: 39
End: 2024-12-05
Payer: MEDICARE

## 2024-12-05 DIAGNOSIS — R73.9 HYPERGLYCEMIA: ICD-10-CM

## 2024-12-05 DIAGNOSIS — Z12.5 SCREENING PSA (PROSTATE SPECIFIC ANTIGEN): ICD-10-CM

## 2024-12-05 DIAGNOSIS — E03.9 HYPOTHYROIDISM (ACQUIRED): Primary | ICD-10-CM

## 2024-12-05 DIAGNOSIS — E78.2 MIXED HYPERLIPIDEMIA: ICD-10-CM

## 2024-12-08 DIAGNOSIS — E78.2 MIXED HYPERLIPIDEMIA: Chronic | ICD-10-CM

## 2024-12-09 RX ORDER — ATORVASTATIN CALCIUM 20 MG/1
20 TABLET, FILM COATED ORAL DAILY
Qty: 90 TABLET | Refills: 3 | Status: SHIPPED | OUTPATIENT
Start: 2024-12-09

## 2024-12-09 NOTE — PROGRESS NOTES
Subsequent Medicare Wellness Visit    Subjective    History of Present Illness:  Sourav Tamez is a 39 y.o. male who presents for a Subsequent Medicare Wellness Visit.    The following portions of the patient's history were reviewed and   updated as appropriate: allergies, current medications, past family history, past medical history, past social history, past surgical history, and problem list.  Family History   Problem Relation Age of Onset    Hypertension Mother     Hyperlipidemia Mother     Heart attack Sister     Hyperlipidemia Maternal Grandmother     Asthma Daughter     Asthma Daughter      Past Surgical History:   Procedure Laterality Date    TONSILLECTOMY  1988        Compared to one year ago, the patient feels his physical   health is the same.    Compared to one year ago, the patient feels his mental   health is the same.    Recent Hospitalizations:  He was not admitted to the hospital during the last year.       Current Medical Providers:  Patient Care Team:  Judy Bassett MD as PCP - General  Judy Bassett MD as PCP - Family Medicine    Outpatient Medications Prior to Visit   Medication Sig Dispense Refill    albuterol sulfate  (90 Base) MCG/ACT inhaler Inhale 1 puff Every 4 (Four) Hours As Needed for Wheezing. 18 g 3    atorvastatin (LIPITOR) 20 MG tablet TAKE 1 TABLET BY MOUTH DAILY 90 tablet 3    baclofen (LIORESAL) 10 MG tablet TAKE 1 TABLET BY MOUTH AT NIGHT AS NEEDED FOR MUSCLE SPASMS 30 tablet 0    budesonide-formoterol (SYMBICORT) 80-4.5 MCG/ACT inhaler INHALE 2 PUFFS BY MOUTH TWICE DAILY 10.2 g 12    busPIRone (BUSPAR) 15 MG tablet Take 1 tablet by mouth 2 (two) times a day.      clotrimazole (LOTRIMIN) 1 % cream APPLY TOPICALLY TO THE AFFECTED AREA TWICE DAILY AS DIRECTED 60 g 3    EPINEPHrine (EPIPEN) 0.3 MG/0.3ML solution auto-injector injection       fluticasone (FLONASE) 50 MCG/ACT nasal spray 2 sprays by Each Nare route Daily.      ibuprofen (ADVIL,MOTRIN) 800 MG tablet  TAKE 1 TABLET BY MOUTH EVERY 8 HOURS AS NEEDED FOR MILD PAIN OR MODERATE PAIN 90 tablet 0    lamoTRIgine (LaMICtal) 200 MG tablet TAKE 1 TABLET BY MOUTH DAILY 30 tablet 1    metoprolol succinate XL (TOPROL-XL) 25 MG 24 hr tablet TAKE 1 TABLET BY MOUTH DAILY 90 tablet 1    polyethylene glycol (MIRALAX) 17 GM/SCOOP powder Take 17 g by mouth Daily. 238 g 2    nystatin (MYCOSTATIN) 657318 UNIT/GM powder Apply  topically to the appropriate area as directed 3 (Three) Times a Day. 60 g 1     No facility-administered medications prior to visit.     Pain Score    12/10/24 9299   PainSc: 0-No pain      No opioid medication identified on active medication list. I have reviewed chart for other potential  high risk medication/s and harmful drug interactions in the elderly.        Aspirin is not on active medication list.  Aspirin use is not indicated based on review of current medical condition/s. Risk of harm outweighs potential benefits.  .    Patient Active Problem List   Diagnosis    Perennial allergic rhinitis    ADD (attention deficit disorder)    Asperger's syndrome    Mixed hyperlipidemia    Sliding hiatal hernia    Anxiety    Moderate major depression    Gastroesophageal reflux disease without esophagitis    Hypothyroidism (acquired)    Mild intermittent asthma without complication    Labile mood    Chronic idiopathic constipation    Overweight with body mass index (BMI) of 28 to 28.9 in adult    Tinea cruris    Medicare annual wellness visit, subsequent    Neck pain    Trapezius muscle spasm    History of use of hearing aid in right ear    Right hip pain    Hyperglycemia    23-polyvalent pneumococcal polysaccharide vaccine declined    Acute pain of right shoulder    Testicle swelling    Class 2 obesity due to excess calories without serious comorbidity with body mass index (BMI) of 38.0 to 38.9 in adult    Seborrheic dermatitis    Long-term use of high-risk medication     Advance Care Planning  Advance Directive is on  "file.  ACP discussion was held with the patient during this visit. Patient has an advance directive in EMR which is still valid.  No changes           Objective    Vitals:    12/10/24 1559   BP: 130/82   BP Location: Right arm   Patient Position: Sitting   Cuff Size: Adult   Pulse: 85   Resp: 18   Temp: 97.5 °F (36.4 °C)   TempSrc: Temporal   SpO2: 99%  Comment: ra   Weight: 81 kg (178 lb 9.6 oz)   Height: 144.8 cm (57\")   PainSc: 0-No pain       Does the patient have evidence of cognitive impairment? No           HEALTH RISK ASSESSMENT    Smoking Status:  Social History     Tobacco Use   Smoking Status Never    Passive exposure: Past (as a child)   Smokeless Tobacco Never     Alcohol Consumption:  Social History     Substance and Sexual Activity   Alcohol Use Yes    Comment: Once in a great while maybe only once or twice a year     Fall Risk Screen:    [place] Fall Risk Assessment was completed, and patient is at LOW risk for falls.Assessment completed on:12/10/2024    Depression Screenin/10/2024     4:00 PM   PHQ-2/PHQ-9 Depression Screening   Little interest or pleasure in doing things Not at all   Feeling down, depressed, or hopeless Not at all   How difficult have these problems made it for you to do your work, take care of things at home, or get along with other people? Not difficult at all       Health Habits and Functional and Cognitive Screenin/8/2024     3:37 PM   Functional & Cognitive Status   Do you have difficulty preparing food and eating? No    Do you have difficulty bathing yourself, getting dressed or grooming yourself? No    Do you have difficulty using the toilet? No    Do you have difficulty moving around from place to place? No    Do you have trouble with steps or getting out of a bed or a chair? No    Current Diet Unhealthy Diet    Dental Exam Not up to date    Eye Exam Up to date    Exercise (times per week) 0 times per week    Current Exercises Include No Regular " Exercise;Pilates    Do you need help using the phone?  No    Are you deaf or do you have serious difficulty hearing?  Yes    Do you need help to go to places out of walking distance? No    Do you need help shopping? No    Do you need help preparing meals?  No    Do you need help with housework?  No    Do you need help with laundry? No    Do you need help taking your medications? No    Do you need help managing money? No    Do you ever drive or ride in a car without wearing a seat belt? No    Have you felt unusual stress, anger or loneliness in the last month? Yes    Who do you live with? Other    If you need help, do you have trouble finding someone available to you? No    Have you been bothered in the last four weeks by sexual problems? Yes    Do you have difficulty concentrating, remembering or making decisions? Yes        Patient-reported       Age-appropriate Screening Schedule:  Refer to the list below for future screening recommendations based on patient's age, sex and/or medical conditions. Orders for these recommended tests are listed in the plan section. The patient has been provided with a written plan.    Health Maintenance   Topic Date Due    Pneumococcal Vaccine 0-64 (1 of 2 - PCV) Never done    TDAP/TD VACCINES (1 - Tdap) Never done    COVID-19 Vaccine (4 - 2024-25 season) 09/01/2024    INFLUENZA VACCINE  03/31/2025 (Originally 7/1/2024)    ANNUAL WELLNESS VISIT  12/10/2025    LIPID PANEL  12/10/2025    BMI FOLLOWUP  12/10/2025    HEPATITIS C SCREENING  Completed              Assessment & Plan   Medically necessary, significant, and separately identifiable medical problems identified during this visit are addressed on a separate visit note.    CMS Preventative Services Quick Reference  Risk Factors Identified During Encounter  None Identified  The above risks/problems have been discussed with the patient.  Follow up actions/plans if indicated are seen below in the Assessment/Plan Section.  Pertinent  information has been shared with the patient in the After Visit Summary.    Follow Up:   No follow-ups on file.     An After Visit Summary and PPPS were made available to the patient.    Medicare Wellness  Personal Prevention Plan of Service     Date of Office Visit:  12/10/2024  Encounter Provider:  Judy Bassett MD  Place of Service:  [facility]  Patient Name: Sourav Tamez  :  1985    As part of the Medicare Wellness portion of your visit today, we are providing you with this personalized preventive plan of services (PPPS). This plan is based upon recommendations of the [place] Preventive Services Task Force (USPSTF) and the Advisory Committee on Immunization Practices (ACIP).    This lists the preventive care services that should be considered, and provides dates of when you are due. Items listed as completed are up-to-date and do not require any further intervention.    Health Maintenance   Topic Date Due    Pneumococcal Vaccine 0-64 (1 of 2 - PCV) Never done    TDAP/TD VACCINES (1 - Tdap) Never done    COVID-19 Vaccine ( - - season) 2024    INFLUENZA VACCINE  2025 (Originally 2024)    ANNUAL WELLNESS VISIT  12/10/2025    LIPID PANEL  12/10/2025    BMI FOLLOWUP  12/10/2025    HEPATITIS C SCREENING  Completed       No orders of the defined types were placed in this encounter.      No follow-ups on file.  Sit-to-Stand Exercise    The sit-to-stand exercise (also known as the chair stand or chair rise exercise) strengthens your lower body and helps you maintain or improve your mobility and independence. The goal is to do the sit-to-stand exercise without using your hands. This will be easier as you become stronger. You should always talk with your health care provider before starting any exercise program, especially if you have had recent surgery.  Do the exercise exactly as told by your health care provider and adjust it as directed. It is  normal to feel mild stretching, pulling, tightness, or discomfort as you do this exercise, but you should stop right away if you feel sudden pain or your pain gets worse. Do not begin doing this exercise until told by your health care provider.  What the sit-to-stand exercise does  The sit-to-stand exercise helps to strengthen the muscles in your thighs and the muscles in the center of your body that give you stability (core muscles). This exercise is especially helpful if:  You have had knee or hip surgery.  You have trouble getting up from a chair, out of a car, or off the toilet.  How to do the sit-to-stand exercise  Sit toward the front edge of a sturdy chair without armrests. Your knees should be bent and your feet should be flat on the floor and shoulder-width apart.  Place your hands lightly on each side of the seat. Keep your back and neck as straight as possible, with your chest slightly forward.  Breathe in slowly. Lean forward and slightly shift your weight to the front of your feet.  Breathe out as you slowly stand up. Use your hands as little as possible.  Stand and pause for a full breath in and out.  Breathe in as you sit down slowly. Tighten your core and abdominal muscles to control your lowering as much as possible.  Breathe out slowly.  Do this exercise 10-15 times. If needed, do it fewer times until you build up strength.  Rest for 1 minute, then do another set of 10-15 repetitions.  To change the difficulty of the sit-to-stand exercise  If the exercise is too difficult, use a chair with sturdy armrests, and push off the armrests to help you come to the standing position. You can also use the armrests to help slowly lower yourself back to sitting. As this gets easier, try to use your arms less. You can also place a firm cushion or pillow on the chair to make the surface higher.  If this exercise is too easy, do not use your arms to help raise or lower yourself. You can also wear a weighted vest,  use hand weights, increase your repetitions, or try a lower chair.  General tips  You may feel tired when starting an exercise routine. This is normal.  You may have muscle soreness that lasts a few days. This is normal. As you get stronger, you may not feel muscle soreness.  Use smooth, steady movements.  Do not  hold your breath during strength exercises. This can cause unsafe changes in your blood pressure.  Breathe in slowly through your nose, and breathe out slowly through your mouth.  Summary  Strengthening your lower body is an important step to help you move safely and independently.  The sit-to-stand exercise helps strengthen the muscles in your thighs and core.  You should always talk with your health care provider before starting any exercise program, especially if you have had recent surgery.  This information is not intended to replace advice given to you by your health care provider. Make sure you discuss any questions you have with your health care provider.  Document Revised: 10/16/2019 Document Reviewed: 02/08/2018  Elsevier Patient Education © 2021 [employer]    Advance Care Planning and Advance Directives     You make decisions on a daily basis - decisions about where you want to live, your career, your home, your life. Perhaps one of the most important decisions you face is your choice for future medical care. Take time to talk with your family and your healthcare team and start planning today.  Advance Care Planning is a process that can help you:  Understand possible future healthcare decisions in light of your own experiences  Reflect on those decision in light of your goals and values  Discuss your decisions with those closest to you and the healthcare professionals that care for you  Make a plan by creating a document that reflects your wishes    Surrogate Decision Maker  In the event of a medical emergency, which has left you unable to communicate or to make your own decisions, you would  need someone to make decisions for you.  It is important to discuss your preferences for medical treatment with this person while you are in good health.     Qualities of a surrogate decision maker:  Willing to take on this role and responsibility  Knows what you want for future medical care  Willing to follow your wishes even if they don't agree with them  Able to make difficult medical decisions under stressful circumstances    Advance Directives  These are legal documents you can create that will guide your healthcare team and decision maker(s) when needed. These documents can be stored in the electronic medical record.    Living Will - a legal document to guide your care if you have a terminal condition or a serious illness and are unable to communicate. States vary by statute in document names/types, but most forms may include one or more of the following:        -  Directions regarding life-prolonging treatments        -  Directions regarding artificially provided nutrition/hydration        -  Choosing a healthcare decision maker        -  Direction regarding organ/tissue donation    Durable Power of  for Healthcare - this document names an -in-fact to make medical decisions for you, but it may also allow this person to make personal and financial decisions for you. Please seek the advice of an  if you need this type of document.    **Advance Directives are not required and no one may discriminate against you if you do not sign one.    Medical Orders  Many states allow specific forms/orders signed by your physician to record your wishes for medical treatment in your current state of health. This form, signed in personal communication with your physician, addresses resuscitation and other medical interventions that you may or may not want.  For more information or to schedule a time with a [facility] Advance Care Planning Facilitator contact: [facility]/ACP or call  [phone] and someone will contact you directly.    Fall Prevention in the Home, Adult  Falls can cause injuries and can happen to people of all ages. There are many things you can do to make your home safe and to help prevent falls. Ask for help when making these changes.  What actions can I take to prevent falls?  General Instructions  Use good lighting in all rooms. Replace any light bulbs that burn out.  Turn on the lights in dark areas. Use night-lights.  Keep items that you use often in easy-to-reach places. Lower the shelves around your home if needed.  Set up your furniture so you have a clear path. Avoid moving your furniture around.  Do not have throw rugs or other things on the floor that can make you trip.  Avoid walking on wet floors.  If any of your floors are uneven, fix them.  Add color or contrast paint or tape to clearly ty and help you see:  Grab bars or handrails.  First and last steps of staircases.  Where the edge of each step is.  If you use a stepladder:  Make sure that it is fully opened. Do not climb a closed stepladder.  Make sure the sides of the stepladder are locked in place.  Ask someone to hold the stepladder while you use it.  Know where your pets are when moving through your home.  What can I do in the bathroom?         Keep the floor dry. Clean up any water on the floor right away.  Remove soap buildup in the tub or shower.  Use nonskid mats or decals on the floor of the tub or shower.  Attach bath mats securely with double-sided, nonslip rug tape.  If you need to sit down in the shower, use a plastic, nonslip stool.  Install grab bars by the toilet and in the tub and shower. Do not use towel bars as grab bars.  What can I do in the bedroom?  Make sure that you have a light by your bed that is easy to reach.  Do not use any sheets or blankets for your bed that hang to the floor.  Have a firm chair with side arms that you can use for support when you get dressed.  What can I do  in the kitchen?  Clean up any spills right away.  If you need to reach something above you, use a step stool with a grab bar.  Keep electrical cords out of the way.  Do not use floor polish or wax that makes floors slippery.  What can I do with my stairs?  Do not leave any items on the stairs.  Make sure that you have a light switch at the top and the bottom of the stairs.  Make sure that there are handrails on both sides of the stairs. Fix handrails that are broken or loose.  Install nonslip stair treads on all your stairs.  Avoid having throw rugs at the top or bottom of the stairs.  Choose a carpet that does not hide the edge of the steps on the stairs.  Check carpeting to make sure that it is firmly attached to the stairs. Fix carpet that is loose or worn.  What can I do on the outside of my home?  Use bright outdoor lighting.  Fix the edges of walkways and driveways and fix any cracks.  Remove anything that might make you trip as you walk through a door, such as a raised step or threshold.  Trim any bushes or trees on paths to your home.  Check to see if handrails are loose or broken and that both sides of all steps have handrails.  Install guardrails along the edges of any raised decks and porches.  Clear paths of anything that can make you trip, such as tools or rocks.  Have leaves, snow, or ice cleared regularly.  Use sand or salt on paths during winter.  Clean up any spills in your garage right away. This includes grease or oil spills.  What other actions can I take?  Wear shoes that:  Have a low heel. Do not wear high heels.  Have rubber bottoms.  Feel good on your feet and fit well.  Are closed at the toe. Do not wear open-toe sandals.  Use tools that help you move around if needed. These include:  Canes.  Walkers.  Scooters.  Crutches.  Review your medicines with your doctor. Some medicines can make you feel dizzy. This can increase your chance of falling.  Ask your doctor what else you can do to help  prevent falls.  Where to find more information  Centers for Disease Control and Prevention, [place]: www.cdc.gov  National [place] on Aging: www.[place].nih.gov  Contact a doctor if:  You are afraid of falling at home.  You feel weak, drowsy, or dizzy at home.  You fall at home.  Summary  There are many simple things that you can do to make your home safe and to help prevent falls.  Ways to make your home safe include removing things that can make you trip and installing grab bars in the bathroom.  Ask for help when making these changes in your home.  This information is not intended to replace advice given to you by your health care provider. Make sure you discuss any questions you have with your health care provider.  Document Revised: 07/21/2021 Document Reviewed: 07/21/2021  [employer] Patient Education © 2021 [employer]         Additional E&M Note during same encounter follows:  Patient has additional, significant, and separately identifiable condition(s)/problem(s) that require work above and beyond the Medicare Wellness Visit       Objective     Subjective   Sourav Tamez is here for:    Chief Complaint   Patient presents with    Medicare Wellness-subsequent    Hyperlipidemia    Hypothyroidism       Subjective   Sourav is also being seen today for an annual adult preventative physical exam.  and Sourav is also being seen today for additional medical problem/s.    Hyperlipidemia  This is a chronic problem. The current episode started more than 1 year ago. Exacerbating diseases include hypothyroidism. He has no history of diabetes or obesity. Pertinent negatives include no chest pain or shortness of breath. Current antihyperlipidemic treatment includes statins. The current treatment provides moderate improvement of lipids. Risk factors for coronary artery disease include dyslipidemia and male sex.   Hypothyroidism  This is a chronic problem. The current episode started more than 1 year ago. Associated symptoms include  fatigue. Pertinent negatives include no chest pain, headaches or weakness. Nothing aggravates the symptoms. He has tried nothing for the symptoms.             Physical Exam:  Review of Systems   Constitutional:  Positive for fatigue.   Respiratory:  Negative for shortness of breath.    Cardiovascular:  Negative for chest pain.   Neurological:  Negative for weakness.        Vitals:    12/10/24 1559   BP: 130/82   Pulse: 85   Resp: 18   Temp: 97.5 °F (36.4 °C)   SpO2: 99%       Physical Exam  Vitals reviewed.   Constitutional:       General: He is not in acute distress.     Appearance: He is well-developed. He is not diaphoretic.   HENT:      Head: Normocephalic and atraumatic.      Right Ear: Tympanic membrane and external ear normal.      Left Ear: Tympanic membrane and external ear normal.      Nose: Nose normal.      Mouth/Throat:      Pharynx: No oropharyngeal exudate.   Eyes:      General: No scleral icterus.        Right eye: No discharge.         Left eye: No discharge.      Conjunctiva/sclera: Conjunctivae normal.      Pupils: Pupils are equal, round, and reactive to light.   Neck:      Thyroid: No thyromegaly.      Trachea: No tracheal deviation.   Cardiovascular:      Rate and Rhythm: Normal rate and regular rhythm.      Heart sounds: Normal heart sounds. No murmur heard.     No friction rub. No gallop.   Pulmonary:      Effort: Pulmonary effort is normal. No respiratory distress.      Breath sounds: Normal breath sounds. No stridor. No wheezing or rales.   Abdominal:      General: Bowel sounds are normal. There is no distension.      Palpations: Abdomen is soft. There is no mass.      Tenderness: There is no abdominal tenderness. There is no guarding or rebound.   Musculoskeletal:         General: No tenderness or deformity. Normal range of motion.      Cervical back: Normal range of motion and neck supple.   Skin:     General: Skin is warm and dry.      Capillary Refill: Capillary refill takes less than 2  seconds.      Coloration: Skin is not pale.      Findings: No erythema or rash.   Neurological:      Mental Status: He is alert and oriented to person, place, and time. He is not disoriented.      Cranial Nerves: No cranial nerve deficit.      Sensory: No sensory deficit.      Motor: No tremor, atrophy or abnormal muscle tone.      Coordination: Coordination normal.      Gait: Gait normal.      Deep Tendon Reflexes: Reflexes are normal and symmetric. Reflexes normal.   Psychiatric:         Behavior: Behavior normal.         Thought Content: Thought content normal.         Judgment: Judgment normal.         Result Review :   The following data was reviewed by: Judy Bassett MD on 12/10/2024:  CMP          3/13/2024    11:51 12/10/2024    12:45   CMP   Glucose 108  96    BUN 12  11    Creatinine 0.94  1.00    Sodium 143  142    Potassium 4.7  4.5    Chloride 102  102    Calcium 9.5  9.3    Total Protein 6.9  6.9    Albumin 4.7  4.9    Globulin 2.2  2.0    Total Bilirubin 0.4  0.4    Alkaline Phosphatase 79  78    AST (SGOT) 23  22    ALT (SGPT) 45  28    BUN/Creatinine Ratio 13  11      CBC w/diff          12/10/2024    12:45   CBC w/Diff   WBC 5.6    RBC 5.37    Hemoglobin 16.3    Hematocrit 47.3    MCV 88    MCH 30.4    MCHC 34.5    RDW 12.3    Platelets 187    Neutrophil Rel % 69    Lymphocyte Rel % 22    Monocyte Rel % 7    Eosinophil Rel % 2    Basophil Rel % 0      Lipid Panel          3/13/2024    11:51 12/10/2024    12:45   Lipid Panel   Total Cholesterol 140  147    Triglycerides 108  71    HDL Cholesterol 35  33    VLDL Cholesterol 20  14    LDL Cholesterol  85  100      TSH          3/13/2024    11:51 4/17/2024    12:21 12/10/2024    12:45   TSH   TSH 5.310  4.280  2.880           Assessment and Plan:  Problem List Items Addressed This Visit          Allergies and Adverse Reactions    Long-term use of high-risk medication    Current Assessment & Plan     Will check labs            Cardiac and Vasculature     Mixed hyperlipidemia (Chronic)    Overview     better         Current Assessment & Plan      Lipid abnormalities are worsening    Plan:  Discussed diet changes.      Discussed medication dosage, use, side effects, and goals of treatment in detail.    Counseled patient on lifestyle modifications to help control hyperlipidemia.     Patient Treatment Goals:   LDL goal is under 130    Followup in 3 months.            Endocrine and Metabolic    Hypothyroidism (acquired) (Chronic)    Overview     recheck today         Hyperglycemia    Class 2 obesity due to excess calories without serious comorbidity with body mass index (BMI) of 38.0 to 38.9 in adult    Current Assessment & Plan     Patient's (Body mass index is 38.65 kg/m².) indicates that they are morbidly/severely obese (BMI > 40 or > 35 with obesity - related health condition) with health conditions that include dyslipidemias . Weight is unchanged. BMI  is above average; BMI management plan is completed. We discussed portion control and increasing exercise.             Health Encounters    Medicare annual wellness visit, subsequent - Primary       Skin    Tinea cruris    Relevant Medications    miconazole (MICOTIN) 2 % cream       Other    Overweight with body mass index (BMI) of 28 to 28.9 in adult                       Assessment and Plan Additional age appropriate preventative wellness advice topics were discussed during today's preventative wellness exam(some topics already addressed during AWV portion of the note above):    Physical Activity: Advised cardiovascular activity 150 minutes per week as tolerated. (example brisk walk for 30 minutes, 5 days a week).   Nutrition: Discussed nutrition plan with patient. Information shared in after visit summary. Goal is for a well balanced diet to enhance overall health.   Healthy Weight: Discussed current and goal BMI with patient. Steps to attain this goal discussed. Information shared in after visit summary.                             Follow Up   No follow-ups on file.  Patient was given instructions and counseling regarding his condition or for health maintenance advice. Please see specific information pulled into the AVS if appropriate.

## 2024-12-10 ENCOUNTER — OFFICE VISIT (OUTPATIENT)
Dept: FAMILY MEDICINE CLINIC | Facility: CLINIC | Age: 39
End: 2024-12-10
Payer: MEDICARE

## 2024-12-10 VITALS
DIASTOLIC BLOOD PRESSURE: 82 MMHG | WEIGHT: 178.6 LBS | RESPIRATION RATE: 18 BRPM | OXYGEN SATURATION: 99 % | HEIGHT: 60 IN | SYSTOLIC BLOOD PRESSURE: 130 MMHG | BODY MASS INDEX: 35.06 KG/M2 | TEMPERATURE: 97.5 F | HEART RATE: 85 BPM

## 2024-12-10 DIAGNOSIS — E66.3 OVERWEIGHT WITH BODY MASS INDEX (BMI) OF 28 TO 28.9 IN ADULT: ICD-10-CM

## 2024-12-10 DIAGNOSIS — Z79.899 LONG-TERM USE OF HIGH-RISK MEDICATION: ICD-10-CM

## 2024-12-10 DIAGNOSIS — B35.6 TINEA CRURIS: ICD-10-CM

## 2024-12-10 DIAGNOSIS — E66.09 CLASS 2 OBESITY DUE TO EXCESS CALORIES WITHOUT SERIOUS COMORBIDITY WITH BODY MASS INDEX (BMI) OF 38.0 TO 38.9 IN ADULT: ICD-10-CM

## 2024-12-10 DIAGNOSIS — Z00.00 MEDICARE ANNUAL WELLNESS VISIT, SUBSEQUENT: Primary | ICD-10-CM

## 2024-12-10 DIAGNOSIS — E03.9 HYPOTHYROIDISM (ACQUIRED): Chronic | ICD-10-CM

## 2024-12-10 DIAGNOSIS — E66.812 CLASS 2 OBESITY DUE TO EXCESS CALORIES WITHOUT SERIOUS COMORBIDITY WITH BODY MASS INDEX (BMI) OF 38.0 TO 38.9 IN ADULT: ICD-10-CM

## 2024-12-10 DIAGNOSIS — R73.9 HYPERGLYCEMIA: ICD-10-CM

## 2024-12-10 DIAGNOSIS — E78.2 MIXED HYPERLIPIDEMIA: Chronic | ICD-10-CM

## 2024-12-10 RX ORDER — MICONAZOLE NITRATE 20 MG/G
1 CREAM TOPICAL 2 TIMES DAILY
Qty: 60 G | Refills: 3 | Status: SHIPPED | OUTPATIENT
Start: 2024-12-10

## 2024-12-11 LAB
ALBUMIN SERPL-MCNC: 4.9 G/DL (ref 4.1–5.1)
ALP SERPL-CCNC: 78 IU/L (ref 44–121)
ALT SERPL-CCNC: 28 IU/L (ref 0–44)
AST SERPL-CCNC: 22 IU/L (ref 0–40)
BASOPHILS # BLD AUTO: 0 X10E3/UL (ref 0–0.2)
BASOPHILS NFR BLD AUTO: 0 %
BILIRUB SERPL-MCNC: 0.4 MG/DL (ref 0–1.2)
BUN SERPL-MCNC: 11 MG/DL (ref 6–24)
BUN/CREAT SERPL: 11 (ref 9–20)
CALCIUM SERPL-MCNC: 9.3 MG/DL (ref 8.7–10.2)
CHLORIDE SERPL-SCNC: 102 MMOL/L (ref 96–106)
CHOLEST SERPL-MCNC: 147 MG/DL (ref 100–199)
CHOLEST/HDLC SERPL: 4.5 RATIO (ref 0–5)
CO2 SERPL-SCNC: 23 MMOL/L (ref 20–29)
CREAT SERPL-MCNC: 1 MG/DL (ref 0.76–1.27)
EGFRCR SERPLBLD CKD-EPI 2021: 97 ML/MIN/1.73
EOSINOPHIL # BLD AUTO: 0.1 X10E3/UL (ref 0–0.4)
EOSINOPHIL NFR BLD AUTO: 2 %
ERYTHROCYTE [DISTWIDTH] IN BLOOD BY AUTOMATED COUNT: 12.3 % (ref 11.6–15.4)
GLOBULIN SER CALC-MCNC: 2 G/DL (ref 1.5–4.5)
GLUCOSE SERPL-MCNC: 96 MG/DL (ref 70–99)
HBA1C MFR BLD: 5.4 % (ref 4.8–5.6)
HCT VFR BLD AUTO: 47.3 % (ref 37.5–51)
HDLC SERPL-MCNC: 33 MG/DL
HGB BLD-MCNC: 16.3 G/DL (ref 13–17.7)
IMM GRANULOCYTES # BLD AUTO: 0 X10E3/UL (ref 0–0.1)
IMM GRANULOCYTES NFR BLD AUTO: 0 %
LDLC SERPL CALC-MCNC: 100 MG/DL (ref 0–99)
LYMPHOCYTES # BLD AUTO: 1.2 X10E3/UL (ref 0.7–3.1)
LYMPHOCYTES NFR BLD AUTO: 22 %
MCH RBC QN AUTO: 30.4 PG (ref 26.6–33)
MCHC RBC AUTO-ENTMCNC: 34.5 G/DL (ref 31.5–35.7)
MCV RBC AUTO: 88 FL (ref 79–97)
MONOCYTES # BLD AUTO: 0.4 X10E3/UL (ref 0.1–0.9)
MONOCYTES NFR BLD AUTO: 7 %
NEUTROPHILS # BLD AUTO: 3.8 X10E3/UL (ref 1.4–7)
NEUTROPHILS NFR BLD AUTO: 69 %
PLATELET # BLD AUTO: 187 X10E3/UL (ref 150–450)
POTASSIUM SERPL-SCNC: 4.5 MMOL/L (ref 3.5–5.2)
PROT SERPL-MCNC: 6.9 G/DL (ref 6–8.5)
RBC # BLD AUTO: 5.37 X10E6/UL (ref 4.14–5.8)
SODIUM SERPL-SCNC: 142 MMOL/L (ref 134–144)
TRIGL SERPL-MCNC: 71 MG/DL (ref 0–149)
TSH SERPL DL<=0.005 MIU/L-ACNC: 2.88 UIU/ML (ref 0.45–4.5)
VLDLC SERPL CALC-MCNC: 14 MG/DL (ref 5–40)
WBC # BLD AUTO: 5.6 X10E3/UL (ref 3.4–10.8)

## 2024-12-16 NOTE — ASSESSMENT & PLAN NOTE
Lipid abnormalities are worsening    Plan:  Discussed diet changes.      Discussed medication dosage, use, side effects, and goals of treatment in detail.    Counseled patient on lifestyle modifications to help control hyperlipidemia.     Patient Treatment Goals:   LDL goal is under 130    Followup in 3 months.

## 2024-12-16 NOTE — ASSESSMENT & PLAN NOTE
Patient's (Body mass index is 38.65 kg/m².) indicates that they are morbidly/severely obese (BMI > 40 or > 35 with obesity - related health condition) with health conditions that include dyslipidemias . Weight is unchanged. BMI  is above average; BMI management plan is completed. We discussed portion control and increasing exercise.

## 2025-01-01 DIAGNOSIS — E78.2 MIXED HYPERLIPIDEMIA: Chronic | ICD-10-CM

## 2025-01-03 RX ORDER — ATORVASTATIN CALCIUM 20 MG/1
20 TABLET, FILM COATED ORAL DAILY
Qty: 90 TABLET | Refills: 3 | Status: SHIPPED | OUTPATIENT
Start: 2025-01-03

## 2025-01-08 DIAGNOSIS — K59.04 CHRONIC IDIOPATHIC CONSTIPATION: ICD-10-CM

## 2025-01-09 RX ORDER — POLYETHYLENE GLYCOL 3350 17 G/17G
17 POWDER, FOR SOLUTION ORAL DAILY
Qty: 238 G | Refills: 2 | Status: SHIPPED | OUTPATIENT
Start: 2025-01-09

## 2025-01-13 NOTE — PROGRESS NOTES
Subjective   Sourav Tamez is a 40 y.o. male. Presents to [facility]    Chief Complaint   Patient presents with    Pneumonia       History of Present Illness  Sourav was seen at [facility] on 01/03/25. He was seen for cough. Labs that were performed during the encounter included: none. Diagnostic studies that were performed included: Chest x-ray-left lower lobe pneumonia. Medication changes: cipro.   Pneumonia  He complains of cough and wheezing. There is no chest tightness, difficulty breathing, frequent throat clearing, shortness of breath or sputum production. This is a new problem. The current episode started 1 to 4 weeks ago. The problem occurs daily. The problem has been unchanged. The cough is non-productive and dry. Associated symptoms include malaise/fatigue. Pertinent negatives include no appetite change, dyspnea on exertion, fever, headaches, postnasal drip, rhinorrhea or sore throat. His symptoms are aggravated by nothing. Relieved by: cipro. He reports minimal improvement on treatment.        I personally reviewed and updated the patient's allergies, medications, problem list, and past medical, surgical, social, and family history. I have reviewed and confirmed the accuracy of the History of Present Illness and Review of Symptoms as documented by the MA/LPN/RN. Judy Bassett MD    Allergies:  Allergies   Allergen Reactions    Amoxicillin Swelling and Shortness Of Breath    Cetirizine Swelling    Diphenhydramine Swelling    Doxycycline Swelling    Escitalopram Swelling     Swelling of throat    Fexofenadine Anaphylaxis    Loratadine Swelling    Montelukast Swelling    Penicillins Shortness Of Breath    Prednisone Swelling    Trazodone Swelling    Codeine Myalgia    Latex Rash    Mucinex [Guaifenesin Er] Cough    Olanzapine Rash    Robitussin (Alcohol Free) [Guaifenesin] Cough       Social History:  Social History     Socioeconomic History    Marital status:     Tobacco Use    Smoking status: Never     Passive exposure: Past (as a child)    Smokeless tobacco: Never   Vaping Use    Vaping status: Never Used   Substance and Sexual Activity    Alcohol use: Yes     Comment: Once in a great while maybe only once or twice a year    Drug use: No    Sexual activity: Defer       Family History:  Family History   Problem Relation Age of Onset    Hypertension Mother     Hyperlipidemia Mother     Heart attack Sister     Hyperlipidemia Maternal Grandmother     Asthma Daughter     Asthma Daughter        Past Medical History :  Patient Active Problem List   Diagnosis    Perennial allergic rhinitis    ADD (attention deficit disorder)    Asperger's syndrome    Mixed hyperlipidemia    Sliding hiatal hernia    Anxiety    Moderate major depression    Gastroesophageal reflux disease without esophagitis    Hypothyroidism (acquired)    Mild intermittent asthma without complication    Labile mood    Chronic idiopathic constipation    Overweight with body mass index (BMI) of 28 to 28.9 in adult    Tinea cruris    Medicare annual wellness visit, subsequent    Neck pain    Trapezius muscle spasm    History of use of hearing aid in right ear    Right hip pain    Hyperglycemia    23-polyvalent pneumococcal polysaccharide vaccine declined    Acute pain of right shoulder    Testicle swelling    Class 2 obesity due to excess calories without serious comorbidity with body mass index (BMI) of 38.0 to 38.9 in adult    Seborrheic dermatitis    Long-term use of high-risk medication    Pneumonia of left lower lobe due to infectious organism    Herpes zoster without complication       Medication List:    Current Outpatient Medications:     albuterol sulfate  (90 Base) MCG/ACT inhaler, Inhale 1 puff Every 4 (Four) Hours As Needed for Wheezing., Disp: 18 g, Rfl: 3    atorvastatin (LIPITOR) 20 MG tablet, Take 1 tablet by mouth Daily., Disp: 90 tablet, Rfl: 3    baclofen (LIORESAL) 10 MG tablet, TAKE 1  "TABLET BY MOUTH AT NIGHT AS NEEDED FOR MUSCLE SPASMS, Disp: 30 tablet, Rfl: 0    budesonide-formoterol (SYMBICORT) 80-4.5 MCG/ACT inhaler, INHALE 2 PUFFS BY MOUTH TWICE DAILY, Disp: 10.2 g, Rfl: 12    busPIRone (BUSPAR) 15 MG tablet, Take 1 tablet by mouth 2 (two) times a day., Disp: , Rfl:     clotrimazole (LOTRIMIN) 1 % cream, APPLY TOPICALLY TO THE AFFECTED AREA TWICE DAILY AS DIRECTED, Disp: 60 g, Rfl: 3    EPINEPHrine (EPIPEN) 0.3 MG/0.3ML solution auto-injector injection, , Disp: , Rfl:     fluticasone (FLONASE) 50 MCG/ACT nasal spray, 2 sprays by Each Nare route Daily., Disp: , Rfl:     ibuprofen (ADVIL,MOTRIN) 800 MG tablet, TAKE 1 TABLET BY MOUTH EVERY 8 HOURS AS NEEDED FOR MILD PAIN OR MODERATE PAIN, Disp: 90 tablet, Rfl: 0    lamoTRIgine (LaMICtal) 200 MG tablet, TAKE 1 TABLET BY MOUTH DAILY, Disp: 30 tablet, Rfl: 1    metoprolol succinate XL (TOPROL-XL) 25 MG 24 hr tablet, TAKE 1 TABLET BY MOUTH DAILY, Disp: 90 tablet, Rfl: 1    miconazole (MICOTIN) 2 % cream, Apply 1 Application topically to the appropriate area as directed 2 (Two) Times a Day., Disp: 60 g, Rfl: 3    polyethylene glycol (MIRALAX) 17 GM/SCOOP powder, Take 17 g by mouth Daily., Disp: 238 g, Rfl: 2    Past Surgical History:  Past Surgical History:   Procedure Laterality Date    TONSILLECTOMY  1988         Physical Exam:      Vital Signs:    Vitals:    01/14/25 1451   BP: 114/62   Pulse: 98   Resp: 18   Temp: 97.1 °F (36.2 °C)   SpO2: 98%        /62 (BP Location: Left arm, Patient Position: Sitting, Cuff Size: Adult)   Pulse 98   Temp 97.1 °F (36.2 °C) (Temporal)   Resp 18   Ht 144.8 cm (57\")   Wt 81.6 kg (179 lb 12.8 oz)   SpO2 98% Comment: ra  BMI 38.91 kg/m²     Wt Readings from Last 3 Encounters:   01/14/25 81.6 kg (179 lb 12.8 oz)   12/10/24 81 kg (178 lb 9.6 oz)   11/11/24 81.1 kg (178 lb 12.8 oz)       Result Review :   The following data was reviewed by: Judy Bassett MD on 01/14/2025:         [facility] " [facility] on 01/03/25. He was seen for cough. Labs that were performed during the encounter included: none. Diagnostic studies that were performed included: Chest x-ray-left lower lobe pneumonia. Medication changes: cipro    Physical Exam  Vitals reviewed.   Constitutional:       Appearance: Normal appearance. He is well-developed.   HENT:      Head: Normocephalic and atraumatic.   Eyes:      General:         Right eye: No discharge.         Left eye: No discharge.   Cardiovascular:      Rate and Rhythm: Normal rate and regular rhythm.      Heart sounds: Normal heart sounds. No murmur heard.     No friction rub. No gallop.   Pulmonary:      Effort: Pulmonary effort is normal. No respiratory distress.      Breath sounds: Normal breath sounds. No wheezing or rales.   Skin:     General: Skin is warm and dry.      Findings: No rash.      Comments: Left side of neck with dried blisters and mild erythema   Neurological:      Mental Status: He is alert and oriented to person, place, and time.      Coordination: Coordination normal.      Gait: Gait normal.   Psychiatric:         Behavior: Behavior is cooperative.         Assessment and Plan:  Problems Addressed this Visit          Endocrine and Metabolic    Class 2 obesity due to excess calories without serious comorbidity with body mass index (BMI) of 38.0 to 38.9 in adult     Patient's (Body mass index is 38.91 kg/m².) indicates that they are morbidly/severely obese (BMI > 40 or > 35 with obesity - related health condition) with health conditions that include dyslipidemias . Weight is worsening. BMI  is above average; BMI management plan is completed. We discussed low calorie, low carb based diet program, portion control, and increasing exercise.             Infectious Diseases    Herpes zoster without complication     Left neck    Looks like healed zoster  Not painful  Watch for changes.             Pulmonary and Pneumonias    Pneumonia of left lower lobe due to  infectious organism - Primary     Resolved  Recheck xray in 6 weeks          Diagnoses         Codes Comments    Pneumonia of left lower lobe due to infectious organism    -  Primary ICD-10-CM: J18.9  ICD-9-CM: 486     Class 2 obesity due to excess calories without serious comorbidity with body mass index (BMI) of 38.0 to 38.9 in adult     ICD-10-CM: E66.812, E66.09, Z68.38  ICD-9-CM: 278.00, V85.38     Herpes zoster without complication     ICD-10-CM: B02.9  ICD-9-CM: 053.9                          An After Visit Summary and PPPS were given to the patient.       This document is intended for medical expert use only. Reading of this document by patients and/or patient's family without participating medical staff guidance may result in misinterpretation and unintended morbidity. Any interpretation of such data is the responsibility of the patient and/or family member responsible for the patient in concert with their primary or specialist providers, not to be left for sources of online searches such as [employer], [employer] or similar queries. Relying on these approaches to knowledge may result in misinterpretation, misguided goals of care and even death should patients or family members try recommendations outside of the realm of professional medical care.

## 2025-01-14 ENCOUNTER — OFFICE VISIT (OUTPATIENT)
Dept: FAMILY MEDICINE CLINIC | Facility: CLINIC | Age: 40
End: 2025-01-14
Payer: MEDICARE

## 2025-01-14 VITALS
OXYGEN SATURATION: 98 % | HEART RATE: 98 BPM | BODY MASS INDEX: 35.3 KG/M2 | DIASTOLIC BLOOD PRESSURE: 62 MMHG | SYSTOLIC BLOOD PRESSURE: 114 MMHG | HEIGHT: 60 IN | RESPIRATION RATE: 18 BRPM | TEMPERATURE: 97.1 F | WEIGHT: 179.8 LBS

## 2025-01-14 DIAGNOSIS — E66.812 CLASS 2 OBESITY DUE TO EXCESS CALORIES WITHOUT SERIOUS COMORBIDITY WITH BODY MASS INDEX (BMI) OF 38.0 TO 38.9 IN ADULT: ICD-10-CM

## 2025-01-14 DIAGNOSIS — B02.9 HERPES ZOSTER WITHOUT COMPLICATION: ICD-10-CM

## 2025-01-14 DIAGNOSIS — J18.9 PNEUMONIA OF LEFT LOWER LOBE DUE TO INFECTIOUS ORGANISM: Primary | ICD-10-CM

## 2025-01-14 DIAGNOSIS — E66.09 CLASS 2 OBESITY DUE TO EXCESS CALORIES WITHOUT SERIOUS COMORBIDITY WITH BODY MASS INDEX (BMI) OF 38.0 TO 38.9 IN ADULT: ICD-10-CM

## 2025-01-14 PROCEDURE — 99213 OFFICE O/P EST LOW 20 MIN: CPT | Performed by: FAMILY MEDICINE

## 2025-01-14 PROCEDURE — G2211 COMPLEX E/M VISIT ADD ON: HCPCS | Performed by: FAMILY MEDICINE

## 2025-01-14 PROCEDURE — 1126F AMNT PAIN NOTED NONE PRSNT: CPT | Performed by: FAMILY MEDICINE

## 2025-01-14 RX ORDER — CIPROFLOXACIN 500 MG/1
1 TABLET, FILM COATED ORAL EVERY 12 HOURS SCHEDULED
COMMUNITY
Start: 2024-12-31 | End: 2025-01-14

## 2025-01-27 NOTE — ASSESSMENT & PLAN NOTE
Patient's (Body mass index is 38.91 kg/m².) indicates that they are morbidly/severely obese (BMI > 40 or > 35 with obesity - related health condition) with health conditions that include dyslipidemias . Weight is worsening. BMI  is above average; BMI management plan is completed. We discussed low calorie, low carb based diet program, portion control, and increasing exercise.

## 2025-01-29 DIAGNOSIS — E78.2 MIXED HYPERLIPIDEMIA: Chronic | ICD-10-CM

## 2025-01-29 DIAGNOSIS — R07.9 CHEST PAIN, UNSPECIFIED TYPE: ICD-10-CM

## 2025-01-29 RX ORDER — METOPROLOL SUCCINATE 25 MG/1
25 TABLET, EXTENDED RELEASE ORAL DAILY
Qty: 90 TABLET | Refills: 1 | Status: SHIPPED | OUTPATIENT
Start: 2025-01-29

## 2025-02-05 DIAGNOSIS — J45.20 MILD INTERMITTENT ASTHMA WITHOUT COMPLICATION: ICD-10-CM

## 2025-02-05 RX ORDER — BUDESONIDE AND FORMOTEROL FUMARATE DIHYDRATE 80; 4.5 UG/1; UG/1
2 AEROSOL RESPIRATORY (INHALATION) 2 TIMES DAILY
Qty: 10.2 G | Refills: 12 | Status: SHIPPED | OUTPATIENT
Start: 2025-02-05

## 2025-02-21 NOTE — PROGRESS NOTES
Subjective   Sourav Tamez is a 40 y.o. male. Presents to [facility]    Chief Complaint   Patient presents with    Pneumonia       Pneumonia  There is no chest tightness, cough, difficulty breathing, shortness of breath, sputum production or wheezing. This is a new problem. The current episode started 1 to 4 weeks ago. The problem occurs rarely. The problem has been gradually improving. Associated symptoms include malaise/fatigue. Pertinent negatives include no appetite change, chest pain, dyspnea on exertion, headaches, nasal congestion, postnasal drip or sore throat. His symptoms are aggravated by nothing. His symptoms are alleviated by nothing.        I personally reviewed and updated the patient's allergies, medications, problem list, and past medical, surgical, social, and family history. I have reviewed and confirmed the accuracy of the History of Present Illness and Review of Symptoms as documented by the MA/LPN/RN. Judy Bassett MD    Allergies:  Allergies   Allergen Reactions    Amoxicillin Swelling and Shortness Of Breath    Cetirizine Swelling    Diphenhydramine Swelling    Doxycycline Swelling    Escitalopram Swelling     Swelling of throat    Fexofenadine Anaphylaxis    Loratadine Swelling    Montelukast Swelling    Penicillins Shortness Of Breath    Prednisone Swelling    Trazodone Swelling    Codeine Myalgia    Latex Rash    Mucinex [Guaifenesin Er] Cough    Olanzapine Rash    Robitussin (Alcohol Free) [Guaifenesin] Cough       Social History:  Social History     Socioeconomic History    Marital status:    Tobacco Use    Smoking status: Never     Passive exposure: Past (as a child)    Smokeless tobacco: Never   Vaping Use    Vaping status: Never Used   Substance and Sexual Activity    Alcohol use: Yes     Comment: Once in a great while maybe only once or twice a year    Drug use: No    Sexual activity: Defer       Family History:  Family History   Problem Relation Age of  Onset    Hypertension Mother     Hyperlipidemia Mother     Heart attack Sister     Hyperlipidemia Maternal Grandmother     Asthma Daughter     Asthma Daughter        Past Medical History :  Patient Active Problem List   Diagnosis    Perennial allergic rhinitis    ADD (attention deficit disorder)    Asperger's syndrome    Mixed hyperlipidemia    Sliding hiatal hernia    Anxiety    Moderate major depression    Gastroesophageal reflux disease without esophagitis    Hypothyroidism (acquired)    Mild intermittent asthma without complication    Labile mood    Chronic idiopathic constipation    Overweight with body mass index (BMI) of 28 to 28.9 in adult    Tinea cruris    Medicare annual wellness visit, subsequent    Neck pain    Trapezius muscle spasm    History of use of hearing aid in right ear    Right hip pain    Hyperglycemia    23-polyvalent pneumococcal polysaccharide vaccine declined    Acute pain of right shoulder    Testicle swelling    Class 2 obesity due to excess calories without serious comorbidity with body mass index (BMI) of 38.0 to 38.9 in adult    Seborrheic dermatitis    Long-term use of high-risk medication    Pneumonia of left lower lobe due to infectious organism    Herpes zoster without complication    Fatigue       Medication List:    Current Outpatient Medications:     albuterol sulfate  (90 Base) MCG/ACT inhaler, Inhale 1 puff Every 4 (Four) Hours As Needed for Wheezing., Disp: 18 g, Rfl: 5    atorvastatin (LIPITOR) 20 MG tablet, Take 1 tablet by mouth Daily., Disp: 90 tablet, Rfl: 3    baclofen (LIORESAL) 10 MG tablet, TAKE 1 TABLET BY MOUTH AT NIGHT AS NEEDED FOR MUSCLE SPASMS, Disp: 30 tablet, Rfl: 0    budesonide-formoterol (SYMBICORT) 80-4.5 MCG/ACT inhaler, INHALE 2 PUFFS BY MOUTH TWICE DAILY, Disp: 10.2 g, Rfl: 12    busPIRone (BUSPAR) 15 MG tablet, Take 1 tablet by mouth 2 (two) times a day., Disp: , Rfl:     clotrimazole (LOTRIMIN) 1 % cream, APPLY TOPICALLY TO THE AFFECTED AREA  "TWICE DAILY AS DIRECTED, Disp: 60 g, Rfl: 3    EPINEPHrine (EPIPEN) 0.3 MG/0.3ML solution auto-injector injection, , Disp: , Rfl:     fluticasone (FLONASE) 50 MCG/ACT nasal spray, 2 sprays by Each Nare route Daily., Disp: , Rfl:     ibuprofen (ADVIL,MOTRIN) 800 MG tablet, TAKE 1 TABLET BY MOUTH EVERY 8 HOURS AS NEEDED FOR MILD PAIN OR MODERATE PAIN, Disp: 90 tablet, Rfl: 0    lamoTRIgine (LaMICtal) 200 MG tablet, TAKE 1 TABLET BY MOUTH DAILY, Disp: 30 tablet, Rfl: 1    metoprolol succinate XL (TOPROL-XL) 25 MG 24 hr tablet, TAKE 1 TABLET BY MOUTH DAILY, Disp: 90 tablet, Rfl: 1    miconazole (MICOTIN) 2 % cream, Apply 1 Application topically to the appropriate area as directed 2 (Two) Times a Day., Disp: 60 g, Rfl: 3    polyethylene glycol (MIRALAX) 17 GM/SCOOP powder, TAKE 17 GRAMS( 1 CAPFUL) BY MOUTH EVERY DAY., Disp: 238 g, Rfl: 2    Past Surgical History:  Past Surgical History:   Procedure Laterality Date    TONSILLECTOMY  1988         Physical Exam:      Vital Signs:    Vitals:    02/25/25 1438   BP: 110/70   Pulse: 89   Resp: 17   Temp: 97.3 °F (36.3 °C)   SpO2: 97%        /70 (BP Location: Right arm, Patient Position: Sitting, Cuff Size: Adult)   Pulse 89   Temp 97.3 °F (36.3 °C) (Temporal)   Resp 17   Ht 144.8 cm (57\")   Wt 80.1 kg (176 lb 9.6 oz)   SpO2 97% Comment: ra  BMI 38.22 kg/m²     Wt Readings from Last 3 Encounters:   02/25/25 80.1 kg (176 lb 9.6 oz)   01/14/25 81.6 kg (179 lb 12.8 oz)   12/10/24 81 kg (178 lb 9.6 oz)       Result Review :                Physical Exam  Vitals reviewed.   Constitutional:       Appearance: Normal appearance. He is well-developed.   HENT:      Head: Normocephalic and atraumatic.   Eyes:      General:         Right eye: No discharge.         Left eye: No discharge.   Cardiovascular:      Rate and Rhythm: Normal rate and regular rhythm.      Heart sounds: Normal heart sounds. No murmur heard.     No friction rub. No gallop.   Pulmonary:      Effort: " Pulmonary effort is normal. No respiratory distress.      Breath sounds: Normal breath sounds. No wheezing or rales.   Skin:     General: Skin is warm and dry.      Findings: No rash.   Neurological:      Mental Status: He is alert and oriented to person, place, and time.      Coordination: Coordination normal.      Gait: Gait normal.   Psychiatric:         Behavior: Behavior is cooperative.       Assessment and Plan:  Problems Addressed this Visit          Allergies and Adverse Reactions    Long-term use of high-risk medication    Relevant Orders    Lamotrigine Level (Completed)       Endocrine and Metabolic    Hypothyroidism (acquired) (Chronic)    Relevant Orders    TSH (Completed)    Hyperglycemia    Relevant Orders    Hemoglobin A1c (Completed)    Class 2 obesity due to excess calories without serious comorbidity with body mass index (BMI) of 38.0 to 38.9 in adult     Patient's (Body mass index is 38.22 kg/m².) indicates that they are morbidly/severely obese (BMI > 40 or > 35 with obesity - related health condition) with health conditions that include dyslipidemias . Weight is improving with lifestyle modifications. BMI  is above average; BMI management plan is completed. We discussed low calorie, low carb based diet program, portion control, and increasing exercise.             Pulmonary and Pneumonias    Pneumonia of left lower lobe due to infectious organism - Primary    Relevant Medications    albuterol sulfate  (90 Base) MCG/ACT inhaler    Other Relevant Orders    XR Chest 2 View (Completed)       Symptoms and Signs    Fatigue    Relevant Orders    CBC & Differential (Completed)    Comprehensive Metabolic Panel (Completed)    TSH (Completed)     Diagnoses         Codes Comments    Pneumonia of left lower lobe due to infectious organism    -  Primary ICD-10-CM: J18.9  ICD-9-CM: 486     Class 2 obesity due to excess calories without serious comorbidity with body mass index (BMI) of 38.0 to 38.9 in  adult     ICD-10-CM: E66.812, E66.09, Z68.38  ICD-9-CM: 278.00, V85.38     Hyperglycemia     ICD-10-CM: R73.9  ICD-9-CM: 790.29     Hypothyroidism (acquired)     ICD-10-CM: E03.9  ICD-9-CM: 244.9     Other fatigue     ICD-10-CM: R53.83  ICD-9-CM: 780.79     Long-term use of high-risk medication     ICD-10-CM: Z79.899  ICD-9-CM: V58.69                          An After Visit Summary and PPPS were given to the patient.       This document is intended for medical expert use only. Reading of this document by patients and/or patient's family without participating medical staff guidance may result in misinterpretation and unintended morbidity. Any interpretation of such data is the responsibility of the patient and/or family member responsible for the patient in concert with their primary or specialist providers, not to be left for sources of online searches such as [employer], [employer] or similar queries. Relying on these approaches to knowledge may result in misinterpretation, misguided goals of care and even death should patients or family members try recommendations outside of the realm of professional medical care.

## 2025-02-25 ENCOUNTER — OFFICE VISIT (OUTPATIENT)
Dept: FAMILY MEDICINE CLINIC | Facility: CLINIC | Age: 40
End: 2025-02-25
Payer: MEDICARE

## 2025-02-25 ENCOUNTER — HOSPITAL ENCOUNTER (OUTPATIENT)
Dept: GENERAL RADIOLOGY | Facility: HOSPITAL | Age: 40
Discharge: HOME | End: 2025-02-25
Admitting: FAMILY MEDICINE
Payer: MEDICARE

## 2025-02-25 VITALS
TEMPERATURE: 97.3 F | HEART RATE: 89 BPM | WEIGHT: 176.6 LBS | OXYGEN SATURATION: 97 % | RESPIRATION RATE: 17 BRPM | SYSTOLIC BLOOD PRESSURE: 110 MMHG | HEIGHT: 60 IN | BODY MASS INDEX: 34.67 KG/M2 | DIASTOLIC BLOOD PRESSURE: 70 MMHG

## 2025-02-25 DIAGNOSIS — E66.09 CLASS 2 OBESITY DUE TO EXCESS CALORIES WITHOUT SERIOUS COMORBIDITY WITH BODY MASS INDEX (BMI) OF 38.0 TO 38.9 IN ADULT: ICD-10-CM

## 2025-02-25 DIAGNOSIS — J18.9 PNEUMONIA OF LEFT LOWER LOBE DUE TO INFECTIOUS ORGANISM: Primary | ICD-10-CM

## 2025-02-25 DIAGNOSIS — E66.812 CLASS 2 OBESITY DUE TO EXCESS CALORIES WITHOUT SERIOUS COMORBIDITY WITH BODY MASS INDEX (BMI) OF 38.0 TO 38.9 IN ADULT: ICD-10-CM

## 2025-02-25 DIAGNOSIS — R73.9 HYPERGLYCEMIA: ICD-10-CM

## 2025-02-25 DIAGNOSIS — Z79.899 LONG-TERM USE OF HIGH-RISK MEDICATION: ICD-10-CM

## 2025-02-25 DIAGNOSIS — R53.83 OTHER FATIGUE: ICD-10-CM

## 2025-02-25 DIAGNOSIS — E03.9 HYPOTHYROIDISM (ACQUIRED): Chronic | ICD-10-CM

## 2025-02-25 PROCEDURE — 71046 X-RAY EXAM CHEST 2 VIEWS: CPT

## 2025-02-25 RX ORDER — ALBUTEROL SULFATE 90 UG/1
1 INHALANT RESPIRATORY (INHALATION) EVERY 4 HOURS PRN
Qty: 18 G | Refills: 5 | Status: SHIPPED | OUTPATIENT
Start: 2025-02-25

## 2025-02-26 DIAGNOSIS — K59.04 CHRONIC IDIOPATHIC CONSTIPATION: ICD-10-CM

## 2025-02-26 LAB
ALBUMIN SERPL-MCNC: 4.7 G/DL (ref 4.1–5.1)
ALP SERPL-CCNC: 76 IU/L (ref 44–121)
ALT SERPL-CCNC: 31 IU/L (ref 0–44)
AST SERPL-CCNC: 19 IU/L (ref 0–40)
BASOPHILS # BLD AUTO: 0 X10E3/UL (ref 0–0.2)
BASOPHILS NFR BLD AUTO: 1 %
BILIRUB SERPL-MCNC: 0.4 MG/DL (ref 0–1.2)
BUN SERPL-MCNC: 12 MG/DL (ref 6–24)
BUN/CREAT SERPL: 14 (ref 9–20)
CALCIUM SERPL-MCNC: 9.8 MG/DL (ref 8.7–10.2)
CHLORIDE SERPL-SCNC: 104 MMOL/L (ref 96–106)
CO2 SERPL-SCNC: 26 MMOL/L (ref 20–29)
CREAT SERPL-MCNC: 0.84 MG/DL (ref 0.76–1.27)
EGFRCR SERPLBLD CKD-EPI 2021: 112 ML/MIN/1.73
EOSINOPHIL # BLD AUTO: 0.1 X10E3/UL (ref 0–0.4)
EOSINOPHIL NFR BLD AUTO: 2 %
ERYTHROCYTE [DISTWIDTH] IN BLOOD BY AUTOMATED COUNT: 12.4 % (ref 11.6–15.4)
GLOBULIN SER CALC-MCNC: 2.2 G/DL (ref 1.5–4.5)
GLUCOSE SERPL-MCNC: 90 MG/DL (ref 70–99)
HBA1C MFR BLD: 5.5 % (ref 4.8–5.6)
HCT VFR BLD AUTO: 48.9 % (ref 37.5–51)
HGB BLD-MCNC: 16.2 G/DL (ref 13–17.7)
IMM GRANULOCYTES # BLD AUTO: 0 X10E3/UL (ref 0–0.1)
IMM GRANULOCYTES NFR BLD AUTO: 0 %
LYMPHOCYTES # BLD AUTO: 1.2 X10E3/UL (ref 0.7–3.1)
LYMPHOCYTES NFR BLD AUTO: 19 %
MCH RBC QN AUTO: 30.2 PG (ref 26.6–33)
MCHC RBC AUTO-ENTMCNC: 33.1 G/DL (ref 31.5–35.7)
MCV RBC AUTO: 91 FL (ref 79–97)
MONOCYTES # BLD AUTO: 0.4 X10E3/UL (ref 0.1–0.9)
MONOCYTES NFR BLD AUTO: 6 %
NEUTROPHILS # BLD AUTO: 4.9 X10E3/UL (ref 1.4–7)
NEUTROPHILS NFR BLD AUTO: 72 %
PLATELET # BLD AUTO: 206 X10E3/UL (ref 150–450)
POTASSIUM SERPL-SCNC: 4.7 MMOL/L (ref 3.5–5.2)
PROT SERPL-MCNC: 6.9 G/DL (ref 6–8.5)
RBC # BLD AUTO: 5.36 X10E6/UL (ref 4.14–5.8)
SODIUM SERPL-SCNC: 144 MMOL/L (ref 134–144)
TSH SERPL DL<=0.005 MIU/L-ACNC: 2.3 UIU/ML (ref 0.45–4.5)
WBC # BLD AUTO: 6.7 X10E3/UL (ref 3.4–10.8)

## 2025-02-27 RX ORDER — POLYETHYLENE GLYCOL 3350 17 G/17G
POWDER, FOR SOLUTION ORAL
Qty: 238 G | Refills: 2 | Status: SHIPPED | OUTPATIENT
Start: 2025-02-27

## 2025-02-28 LAB — LAMOTRIGINE SERPL-MCNC: 8.6 UG/ML (ref 2–20)

## 2025-03-01 NOTE — ASSESSMENT & PLAN NOTE
Patient's (Body mass index is 38.22 kg/m².) indicates that they are morbidly/severely obese (BMI > 40 or > 35 with obesity - related health condition) with health conditions that include dyslipidemias . Weight is improving with lifestyle modifications. BMI  is above average; BMI management plan is completed. We discussed low calorie, low carb based diet program, portion control, and increasing exercise.

## 2025-03-05 NOTE — PROGRESS NOTES
Patient ID: Sourav Tamez is a 36 y.o. male.    Chief Complaint:    Chief Complaint   Patient presents with   • Right Elbow - Initial Evaluation       HPI:  Sourav is a 36-year-old gentleman here in consultation from Dr. Bassett with about 2 to 3 weeks of right elbow pain.  He states he fell and landed on his right arm.  He is a tenderness and pain over the lateral upper condyle since that time.  Hurts to lift items.  He has been wearing a brace with mild improvement  Past Medical History:   Diagnosis Date   • Acute bronchitis due to other specified organisms     Impression: Increase fluids. Tylenol/motrin for pain or fever. Medication and medication adverse effects discussed. Follow-up 5-7 days for reevaluation if not improved or sooner if needed. Allergic component. Start prednisone.   • Acute non-recurrent maxillary sinusitis     Impression: mild. Increase fluids. Tylenol/motrin for pain or fever. Medication and medication adverse effects discussed. Follow-up 5-7 days for reevaluation if not improved or sooner if needed.   • Acute non-seasonal allergic rhinitis     Impression: With post nasal drip. Cause of URI symptoms antihistamines discussed Diagnosis, treatment and course discussed. Discussed medication, dosing and adverse effects. Return if there is worsening or persistance of symptoms   • Acute pain of right knee     Impression: discussed nsaids and stretches exam was negative strain most likely   • Advance care planning    • Alcohol screening    • Anxiety and depression     Impression: Good social support, no suicidal or homicidal ideation. Diagnosis and treatment discussed. Discussed counseling. Discussed medication, dosing and adverse effects. Return in 4 weeks   • Asperger syndrome     Impression: stable 9/24/18   • Cough     Impression: Check CXR   • Depression, major, recurrent, mild (CMS/HCC)     Impression: seeing psych   • External hemorrhoid     Impression: none bleeding now and small discussed  Patient awake, alert, in NAD. Fluids running per MAR with no s/sx of infiltration. Denies needs.    "home care   • Folliculitis     Impression: Diagnosis, treatment and course discussed. Discussed medication, dosing and adverse effects. Return if there is worsening or persistance of symptoms   • Gastroesophageal reflux disease without esophagitis     Impression: he has seen GI. refill medication   • Hiatal hernia    • Hypothyroidism     Impression: recheck today   • Impetigo    • Labile mood     Impression: question if he has some bipolar disorder? Given phone number.   • Medicare annual wellness visit, subsequent     With abnormal findings.    • Mild intermittent asthma with (acute) exacerbation     Impression: mildly exacerbated. no wheeze on exam   • Mixed hyperlipidemia     Impression: will check labs when fasting.   • Overweight (BMI 25.0-29.9)    • Right hip pain     Impression: discussed nsaids and stretches exam was negative strain most likely   • Screening for depression    • Screening PSA (prostate specific antigen)    • Substance abuse (CMS/[facility])     Story: multiple psych meds       Past Surgical History:   Procedure Laterality Date   • TONSILLECTOMY  1988       Family History   Problem Relation Age of Onset   • Hypertension Mother    • Hyperlipidemia Mother    • Hyperlipidemia Maternal Grandmother           Social History     Occupational History   • Not on file   Tobacco Use   • Smoking status: Never Smoker   • Smokeless tobacco: Never Used   Vaping Use   • Vaping Use: Never used   Substance and Sexual Activity   • Alcohol use: Yes   • Drug use: No   • Sexual activity: Defer      Review of Systems   Cardiovascular: Negative for chest pain.   Musculoskeletal: Positive for arthralgias.       Objective:    /72   Pulse 84   Ht 170.2 cm (67.01\")   Wt 87.5 kg (193 lb)   BMI 30.22 kg/m²     Physical Examination:  Right elbow demonstrates intact skin.  No bruising.  Mild diffuse pain to the common extensor muscle.  Minimal to no pain over the lateral epicondyle itself.  Elbow range of motion 0-1 30 " full pronation supination mild pain on resisted wrist extension.  Sensory and motor exam are intact all distributions. Radial pulse is palpable and capillary refill is less than two seconds to all digits.    Imaging:  Previous x-rays demonstrate no fracture right elbow    Assessment:  Right elbow strain    Plan:  Recommend continue conservative treatment with stretching ice bracing and activity modification as needed.  Would expect symptoms to improve in the next 2 to 4 weeks if not consider MRI      Procedures         Disclaimer: Please note that areas of this note were completed with computer voice recognition software.  Quite often unanticipated grammatical, syntax, homophones, and other interpretive errors are inadvertently transcribed by the computer software. Please excuse any errors that have escaped final proofreading.

## 2025-04-07 NOTE — PROGRESS NOTES
Subjective   Sourav Tamez is a 40 y.o. male. Presents to [facility]    Chief Complaint   Patient presents with    Hyperlipidemia    Fatigue       Hyperlipidemia  This is a chronic problem. The current episode started more than 1 year ago. Exacerbating diseases include obesity. He has no history of diabetes. Pertinent negatives include no shortness of breath. Current antihyperlipidemic treatment includes statins. The current treatment provides moderate improvement of lipids. Risk factors for coronary artery disease include dyslipidemia, hypertension and male sex.   Fatigue  Symptoms are chronic.   Onset was 1 to 6 months.   Symptoms occur daily.   Symptoms have been improved since onset.   Symptoms include fatigue.    Treatments tried include nothing.      Labs done and he is doing better. Started with time change    I personally reviewed and updated the patient's allergies, medications, problem list, and past medical, surgical, social, and family history. I have reviewed and confirmed the accuracy of the History of Present Illness and Review of Symptoms as documented by the MA/LPN/RN. Judy Bassett MD    Allergies:  Allergies   Allergen Reactions    Amoxicillin Swelling and Shortness Of Breath    Cetirizine Swelling    Diphenhydramine Swelling    Doxycycline Swelling    Escitalopram Swelling     Swelling of throat    Fexofenadine Anaphylaxis    Loratadine Swelling    Montelukast Swelling    Penicillins Shortness Of Breath    Prednisone Swelling    Trazodone Swelling    Codeine Myalgia    Latex Rash    Mucinex [Guaifenesin Er] Cough    Olanzapine Rash    Robitussin (Alcohol Free) [Guaifenesin] Cough       Social History:  Social History     Socioeconomic History    Marital status:    Tobacco Use    Smoking status: Never     Passive exposure: Past (as a child)    Smokeless tobacco: Never   Vaping Use    Vaping status: Never Used   Substance and Sexual Activity    Alcohol use: Yes      Comment: Once in a great while maybe only once or twice a year    Drug use: No    Sexual activity: Defer       Family History:  Family History   Problem Relation Age of Onset    Hypertension Mother     Hyperlipidemia Mother     Heart attack Sister     Hyperlipidemia Maternal Grandmother     Asthma Daughter     Asthma Daughter        Past Medical History :  Patient Active Problem List   Diagnosis    Perennial allergic rhinitis    ADD (attention deficit disorder)    Asperger's syndrome    Mixed hyperlipidemia    Sliding hiatal hernia    Anxiety    Moderate major depression    Gastroesophageal reflux disease without esophagitis    Hypothyroidism (acquired)    Mild intermittent asthma without complication    Labile mood    Chronic idiopathic constipation    Tinea cruris    Medicare annual wellness visit, subsequent    Neck pain    Trapezius muscle spasm    History of use of hearing aid in right ear    Right hip pain    Hyperglycemia    23-polyvalent pneumococcal polysaccharide vaccine declined    Acute pain of right shoulder    Testicle swelling    Class 2 obesity due to excess calories without serious comorbidity with body mass index (BMI) of 38.0 to 38.9 in adult    Seborrheic dermatitis    Long-term use of high-risk medication    Pneumonia of left lower lobe due to infectious organism    Herpes zoster without complication    Fatigue       Medication List:    Current Outpatient Medications:     albuterol sulfate  (90 Base) MCG/ACT inhaler, Inhale 1 puff Every 4 (Four) Hours As Needed for Wheezing., Disp: 18 g, Rfl: 5    atorvastatin (LIPITOR) 20 MG tablet, Take 1 tablet by mouth Daily., Disp: 90 tablet, Rfl: 3    baclofen (LIORESAL) 10 MG tablet, TAKE 1 TABLET BY MOUTH AT NIGHT AS NEEDED FOR MUSCLE SPASMS, Disp: 30 tablet, Rfl: 0    budesonide-formoterol (SYMBICORT) 80-4.5 MCG/ACT inhaler, INHALE 2 PUFFS BY MOUTH TWICE DAILY, Disp: 10.2 g, Rfl: 12    busPIRone (BUSPAR) 15 MG tablet, Take 1 tablet by mouth 2  "(two) times a day., Disp: , Rfl:     clotrimazole (LOTRIMIN) 1 % cream, APPLY TOPICALLY TO THE AFFECTED AREA TWICE DAILY AS DIRECTED, Disp: 60 g, Rfl: 3    EPINEPHrine (EPIPEN) 0.3 MG/0.3ML solution auto-injector injection, , Disp: , Rfl:     fluticasone (FLONASE) 50 MCG/ACT nasal spray, 2 sprays by Each Nare route Daily., Disp: , Rfl:     ibuprofen (ADVIL,MOTRIN) 800 MG tablet, TAKE 1 TABLET BY MOUTH EVERY 8 HOURS AS NEEDED FOR MILD PAIN OR MODERATE PAIN, Disp: 90 tablet, Rfl: 0    lamoTRIgine (LaMICtal) 200 MG tablet, TAKE 1 TABLET BY MOUTH DAILY, Disp: 30 tablet, Rfl: 1    metoprolol succinate XL (TOPROL-XL) 25 MG 24 hr tablet, TAKE 1 TABLET BY MOUTH DAILY, Disp: 90 tablet, Rfl: 1    miconazole (MICOTIN) 2 % cream, Apply 1 Application topically to the appropriate area as directed 2 (Two) Times a Day., Disp: 60 g, Rfl: 3    polyethylene glycol (MIRALAX) 17 GM/SCOOP powder, TAKE 17 GRAMS( 1 CAPFUL) BY MOUTH EVERY DAY., Disp: 238 g, Rfl: 2    Past Surgical History:  Past Surgical History:   Procedure Laterality Date    TONSILLECTOMY  1988         Physical Exam:      Vital Signs:    Vitals:    04/14/25 1349   BP: 112/78   Pulse: 94   Resp: 17   Temp: 97.5 °F (36.4 °C)   SpO2: 98%        /78 (BP Location: Right arm, Patient Position: Sitting, Cuff Size: Adult)   Pulse 94   Temp 97.5 °F (36.4 °C) (Temporal)   Resp 17   Ht 144.8 cm (57\")   Wt 81.3 kg (179 lb 3.2 oz)   SpO2 98% Comment: ra  BMI 38.78 kg/m²     Wt Readings from Last 3 Encounters:   04/14/25 81.3 kg (179 lb 3.2 oz)   02/25/25 80.1 kg (176 lb 9.6 oz)   01/14/25 81.6 kg (179 lb 12.8 oz)       Result Review :   The following data was reviewed by: Judy Bassett MD on 04/14/2025:            Latest Reference Range & Units 02/25/25 15:38   Sodium 134 - 144 mmol/L 144   Potassium 3.5 - 5.2 mmol/L 4.7   Chloride 96 - 106 mmol/L 104   CO2 20 - 29 mmol/L 26   BUN 6 - 24 mg/dL 12   Creatinine 0.76 - 1.27 mg/dL 0.84   BUN/Creatinine Ratio 9 - 20  14 "   EGFR Result >59 mL/min/1.73 112   Glucose 70 - 99 mg/dL 90   Calcium 8.7 - 10.2 mg/dL 9.8   Alkaline Phosphatase 44 - 121 IU/L 76   Total Protein 6.0 - 8.5 g/dL 6.9   Albumin 4.1 - 5.1 g/dL 4.7   AST (SGOT) 0 - 40 IU/L 19   ALT (SGPT) 0 - 44 IU/L 31   Total Bilirubin 0.0 - 1.2 mg/dL 0.4   Hemoglobin A1C 4.8 - 5.6 % 5.5   TSH Baseline 0.450 - 4.500 uIU/mL 2.300   Globulin 1.5 - 4.5 g/dL 2.2   WBC 3.4 - 10.8 x10E3/uL 6.7   RBC 4.14 - 5.80 x10E6/uL 5.36   Hemoglobin 13.0 - 17.7 g/dL 16.2   Hematocrit 37.5 - 51.0 % 48.9   Platelets 150 - 450 x10E3/uL 206   RDW 11.6 - 15.4 % 12.4   MCV 79 - 97 fL 91   MCH 26.6 - 33.0 pg 30.2   MCHC 31.5 - 35.7 g/dL 33.1   Neutrophil Rel % Not Estab. % 72   Lymphocyte Rel % Not Estab. % 19   Monocyte Rel % Not Estab. % 6   Eosinophil Rel % Not Estab. % 2   Basophil Rel % Not Estab. % 1   Immature Granulocyte Rel % Not Estab. % 0   Neutrophils Absolute 1.4 - 7.0 x10E3/uL 4.9   Lymphocytes Absolute 0.7 - 3.1 x10E3/uL 1.2   Monocytes Absolute 0.1 - 0.9 x10E3/uL 0.4   Eosinophils Absolute 0.0 - 0.4 x10E3/uL 0.1   Basophils Absolute 0.0 - 0.2 x10E3/uL 0.0   Immature Grans, Absolute 0.0 - 0.1 x10E3/uL 0.0   Lamotrigine 2.0 - 20.0 ug/mL 8.6       Physical Exam  Vitals reviewed.   Constitutional:       Appearance: Normal appearance. He is well-developed.   HENT:      Head: Normocephalic and atraumatic.   Eyes:      General:         Right eye: No discharge.         Left eye: No discharge.   Cardiovascular:      Rate and Rhythm: Normal rate and regular rhythm.      Heart sounds: Normal heart sounds. No murmur heard.     No friction rub. No gallop.   Pulmonary:      Effort: Pulmonary effort is normal. No respiratory distress.      Breath sounds: Normal breath sounds. No wheezing or rales.   Skin:     General: Skin is warm and dry.      Findings: No rash.   Neurological:      Mental Status: He is alert and oriented to person, place, and time.      Coordination: Coordination normal.      Gait: Gait  normal.   Psychiatric:         Behavior: Behavior is cooperative.         Assessment and Plan:  Problems Addressed this Visit          Cardiac and Vasculature    Mixed hyperlipidemia - Primary (Chronic)     Lipid abnormalities are improving with treatment    Plan:  Continue same medication/s without change.      Discussed medication dosage, use, side effects, and goals of treatment in detail.    Counseled patient on lifestyle modifications to help control hyperlipidemia.     Patient Treatment Goals:   LDL goal is under 130    Followup in 3 months.            Endocrine and Metabolic    Class 2 obesity due to excess calories without serious comorbidity with body mass index (BMI) of 38.0 to 38.9 in adult    Patient's (Body mass index is 38.78 kg/m².) indicates that they are morbidly/severely obese (BMI > 40 or > 35 with obesity - related health condition) with health conditions that include dyslipidemias . Weight is improving with lifestyle modifications. BMI  is above average; BMI management plan is completed. We discussed low calorie, low carb based diet program, portion control, and increasing exercise.             Symptoms and Signs    Fatigue    Resolved  Labs are in range          Diagnoses         Codes Comments      Mixed hyperlipidemia    -  Primary ICD-10-CM: E78.2  ICD-9-CM: 272.2       Other fatigue     ICD-10-CM: R53.83  ICD-9-CM: 780.79       Class 2 obesity due to excess calories without serious comorbidity with body mass index (BMI) of 38.0 to 38.9 in adult     ICD-10-CM: E66.812, E66.09, Z68.38  ICD-9-CM: 278.00, V85.38                          An After Visit Summary and PPPS were given to the patient.       This document is intended for medical expert use only. Reading of this document by patients and/or patient's family without participating medical staff guidance may result in misinterpretation and unintended morbidity. Any interpretation of such data is the responsibility of the patient and/or  family member responsible for the patient in concert with their primary or specialist providers, not to be left for sources of online searches such as [facility], [employer] or similar queries. Relying on these approaches to knowledge may result in misinterpretation, misguided goals of care and even death should patients or family members try recommendations outside of the realm of professional medical care.

## 2025-04-14 ENCOUNTER — OFFICE VISIT (OUTPATIENT)
Dept: FAMILY MEDICINE CLINIC | Facility: CLINIC | Age: 40
End: 2025-04-14
Payer: MEDICARE

## 2025-04-14 ENCOUNTER — TELEPHONE (OUTPATIENT)
Dept: FAMILY MEDICINE CLINIC | Facility: CLINIC | Age: 40
End: 2025-04-14

## 2025-04-14 VITALS
BODY MASS INDEX: 35.18 KG/M2 | SYSTOLIC BLOOD PRESSURE: 112 MMHG | TEMPERATURE: 97.5 F | HEIGHT: 60 IN | HEART RATE: 94 BPM | DIASTOLIC BLOOD PRESSURE: 78 MMHG | RESPIRATION RATE: 17 BRPM | WEIGHT: 179.2 LBS | OXYGEN SATURATION: 98 %

## 2025-04-14 DIAGNOSIS — R53.83 OTHER FATIGUE: ICD-10-CM

## 2025-04-14 DIAGNOSIS — E66.09 CLASS 2 OBESITY DUE TO EXCESS CALORIES WITHOUT SERIOUS COMORBIDITY WITH BODY MASS INDEX (BMI) OF 38.0 TO 38.9 IN ADULT: ICD-10-CM

## 2025-04-14 DIAGNOSIS — L21.9 SEBORRHEIC DERMATITIS: Primary | ICD-10-CM

## 2025-04-14 DIAGNOSIS — E66.812 CLASS 2 OBESITY DUE TO EXCESS CALORIES WITHOUT SERIOUS COMORBIDITY WITH BODY MASS INDEX (BMI) OF 38.0 TO 38.9 IN ADULT: ICD-10-CM

## 2025-04-14 DIAGNOSIS — E78.2 MIXED HYPERLIPIDEMIA: Primary | Chronic | ICD-10-CM

## 2025-04-14 NOTE — TELEPHONE ENCOUNTER
Dr. Wilkerson's office does not take patient's insurance. I placed new referral to [facility].

## 2025-04-23 PROBLEM — E66.3 OVERWEIGHT WITH BODY MASS INDEX (BMI) OF 28 TO 28.9 IN ADULT: Status: RESOLVED | Noted: 2020-07-13 | Resolved: 2025-04-23

## 2025-04-24 DIAGNOSIS — M25.561 ACUTE PAIN OF RIGHT KNEE: ICD-10-CM

## 2025-04-24 NOTE — ASSESSMENT & PLAN NOTE
Patient's (Body mass index is 38.78 kg/m².) indicates that they are morbidly/severely obese (BMI > 40 or > 35 with obesity - related health condition) with health conditions that include dyslipidemias . Weight is improving with lifestyle modifications. BMI  is above average; BMI management plan is completed. We discussed low calorie, low carb based diet program, portion control, and increasing exercise.

## 2025-04-24 NOTE — ASSESSMENT & PLAN NOTE
Lipid abnormalities are improving with treatment    Plan:  Continue same medication/s without change.      Discussed medication dosage, use, side effects, and goals of treatment in detail.    Counseled patient on lifestyle modifications to help control hyperlipidemia.     Patient Treatment Goals:   LDL goal is under 130    Followup in 3 months.

## 2025-04-25 RX ORDER — IBUPROFEN 800 MG/1
800 TABLET, FILM COATED ORAL EVERY 8 HOURS PRN
Qty: 90 TABLET | Refills: 0 | Status: SHIPPED | OUTPATIENT
Start: 2025-04-25

## 2025-06-30 DIAGNOSIS — R07.9 CHEST PAIN, UNSPECIFIED TYPE: ICD-10-CM

## 2025-06-30 DIAGNOSIS — E78.2 MIXED HYPERLIPIDEMIA: Chronic | ICD-10-CM

## 2025-06-30 RX ORDER — METOPROLOL SUCCINATE 25 MG/1
25 TABLET, EXTENDED RELEASE ORAL DAILY
Qty: 90 TABLET | Refills: 1 | OUTPATIENT
Start: 2025-06-30

## 2025-08-27 DIAGNOSIS — R07.9 CHEST PAIN, UNSPECIFIED TYPE: ICD-10-CM

## 2025-08-27 DIAGNOSIS — E78.2 MIXED HYPERLIPIDEMIA: Chronic | ICD-10-CM

## 2025-08-28 RX ORDER — METOPROLOL SUCCINATE 25 MG/1
25 TABLET, EXTENDED RELEASE ORAL DAILY
Qty: 30 TABLET | Refills: 0 | Status: SHIPPED | OUTPATIENT
Start: 2025-08-28